# Patient Record
Sex: FEMALE | Race: WHITE | NOT HISPANIC OR LATINO | Employment: OTHER | URBAN - METROPOLITAN AREA
[De-identification: names, ages, dates, MRNs, and addresses within clinical notes are randomized per-mention and may not be internally consistent; named-entity substitution may affect disease eponyms.]

---

## 2018-08-17 RX ORDER — FERROUS SULFATE 325(65) MG
1 TABLET ORAL
COMMUNITY
Start: 2006-07-21 | End: 2022-05-16 | Stop reason: SDUPTHER

## 2018-08-17 RX ORDER — DICLOFENAC SODIUM 75 MG/1
TABLET, DELAYED RELEASE ORAL
COMMUNITY
Start: 2016-08-29 | End: 2018-08-20

## 2018-08-17 RX ORDER — DIPHENOXYLATE HYDROCHLORIDE AND ATROPINE SULFATE 2.5; .025 MG/1; MG/1
1 TABLET ORAL
COMMUNITY

## 2018-08-17 RX ORDER — FLUTICASONE PROPIONATE 50 MCG
2 SPRAY, SUSPENSION (ML) NASAL
COMMUNITY
Start: 2018-06-25 | End: 2022-05-16

## 2018-08-17 RX ORDER — ALBUTEROL SULFATE 90 UG/1
2 AEROSOL, METERED RESPIRATORY (INHALATION) EVERY 4 HOURS
COMMUNITY
Start: 2017-11-08 | End: 2021-09-15 | Stop reason: SDUPTHER

## 2018-08-17 RX ORDER — HYDROXYZINE HYDROCHLORIDE 25 MG/1
TABLET, FILM COATED ORAL
COMMUNITY
Start: 2016-08-29 | End: 2021-11-17

## 2018-08-17 RX ORDER — BETAMETHASONE DIPROPIONATE 0.5 MG/G
CREAM TOPICAL
COMMUNITY
Start: 2016-08-29 | End: 2018-08-20

## 2018-08-17 RX ORDER — SENNOSIDES 8.6 MG
CAPSULE ORAL
COMMUNITY
Start: 2012-08-01 | End: 2018-08-20

## 2018-08-17 RX ORDER — ALPRAZOLAM 0.25 MG/1
TABLET ORAL
COMMUNITY
Start: 2015-11-09 | End: 2018-08-20

## 2018-08-17 RX ORDER — AMOXICILLIN 500 MG
CAPSULE ORAL
COMMUNITY
Start: 2015-11-09

## 2018-08-17 RX ORDER — FEXOFENADINE HCL 180 MG/1
TABLET ORAL
COMMUNITY
Start: 2013-11-05 | End: 2021-11-17

## 2018-08-17 RX ORDER — SIMVASTATIN 10 MG
10 TABLET ORAL
COMMUNITY
Start: 2018-04-30 | End: 2021-07-15 | Stop reason: SDUPTHER

## 2018-08-17 RX ORDER — GABAPENTIN 100 MG/1
CAPSULE ORAL
COMMUNITY
Start: 2016-10-05 | End: 2018-08-20

## 2018-08-17 RX ORDER — CLOBETASOL PROPIONATE 0.5 MG/G
CREAM TOPICAL
COMMUNITY
Start: 2015-11-09

## 2018-08-17 RX ORDER — CHLORZOXAZONE 500 MG/1
500 TABLET ORAL
COMMUNITY
Start: 2016-08-29 | End: 2018-08-20

## 2018-08-17 RX ORDER — AMLODIPINE BESYLATE 5 MG/1
5 TABLET ORAL
COMMUNITY
Start: 2018-03-26 | End: 2020-09-08 | Stop reason: SDUPTHER

## 2018-08-17 RX ORDER — LEVOTHYROXINE SODIUM 88 UG/1
88 TABLET ORAL
COMMUNITY
Start: 2018-06-15 | End: 2021-07-15 | Stop reason: SDUPTHER

## 2018-08-20 ENCOUNTER — OFFICE VISIT (OUTPATIENT)
Dept: GYNECOLOGY | Facility: CLINIC | Age: 78
End: 2018-08-20
Payer: COMMERCIAL

## 2018-08-20 VITALS
BODY MASS INDEX: 28.99 KG/M2 | HEART RATE: 68 BPM | HEIGHT: 65 IN | SYSTOLIC BLOOD PRESSURE: 132 MMHG | DIASTOLIC BLOOD PRESSURE: 72 MMHG | WEIGHT: 174 LBS | RESPIRATION RATE: 18 BRPM

## 2018-08-20 DIAGNOSIS — N95.2 POSTMENOPAUSAL ATROPHIC VAGINITIS: ICD-10-CM

## 2018-08-20 DIAGNOSIS — N95.9 MENOPAUSAL DISORDER: Primary | ICD-10-CM

## 2018-08-20 DIAGNOSIS — Z12.31 ENCOUNTER FOR SCREENING MAMMOGRAM FOR MALIGNANT NEOPLASM OF BREAST: ICD-10-CM

## 2018-08-20 DIAGNOSIS — M85.89 OSTEOPENIA OF MULTIPLE SITES: ICD-10-CM

## 2018-08-20 PROCEDURE — G0101 CA SCREEN;PELVIC/BREAST EXAM: HCPCS | Performed by: OBSTETRICS & GYNECOLOGY

## 2018-08-22 NOTE — PROGRESS NOTES
Assessment/Plan:  1  Normal Breast and GYN exam    2  Osteopenia -- FEXA ordered to f/u on severity and progression of disease    3  Atrophic vaginitis -- pt is asymptomatic       Diagnoses and all orders for this visit:    Menopausal disorder  -     DXA bone density spine hip and pelvis; Future    Postmenopausal atrophic vaginitis    Osteopenia of multiple sites  -     DXA bone density spine hip and pelvis; Future    Encounter for screening mammogram for malignant neoplasm of breast  -     Mammo screening bilateral w cad; Future    Other orders  -     Discontinue: acetaminophen (TYLENOL) 650 mg CR tablet; Reported on 3/9/2017   -     albuterol (PROVENTIL HFA,VENTOLIN HFA) 90 mcg/act inhaler; Inhale 2 puffs every 4 (four) hours  -     Discontinue: ALPRAZolam (XANAX) 0 25 mg tablet; Take by mouth  -     amLODIPine (NORVASC) 5 mg tablet; Take 5 mg by mouth  -     beclomethasone (QVAR) 40 MCG/ACT inhaler; Inhale 1 puff  -     Discontinue: betamethasone dipropionate (DIPROSONE) 0 05 % cream; Apply topically  -     BLACK COHOSH PO; 1 tab twice daily  -     Calcium Carbonate-Vitamin D (CALCIUM 500/D PO); Take 2 tablets by mouth  -     Emollient (CERAVE EX); Apply topically  -     Discontinue: chlorzoxazone (PARAFON FORTE) 500 mg tablet; Take 500 mg by mouth  -     Cholecalciferol (D-3-5) 5000 units capsule; Take 5,000 Units by mouth  -     clobetasol (TEMOVATE) 0 05 % cream; Apply topically  -     Discontinue: diclofenac (VOLTAREN) 75 mg EC tablet;   -     ferrous sulfate 325 (65 Fe) mg tablet; Take 1 tablet by mouth  -     fexofenadine (ALLEGRA) 180 MG tablet; Reported on 3/9/2017   -     Omega-3 Fatty Acids (FISH OIL) 1200 MG CAPS; Take by mouth  -     fluticasone (FLONASE) 50 mcg/act nasal spray; 2 sprays into each nostril  -     simvastatin (ZOCOR) 10 mg tablet;  Take 10 mg by mouth  -     Discontinue: gabapentin (NEURONTIN) 100 mg capsule; Reported on 3/9/2017   -     hydrOXYzine HCL (ATARAX) 25 mg tablet;   - levothyroxine 88 mcg tablet; Take 88 mcg by mouth  -     multivitamin (THERAGRAN) TABS; Take 1 tablet by mouth          Subjective:      Patient ID: Samantha Chen is a 70 y o  female  The patient is a 69 y/o, who has osteopenia and needs to F/u with a DEXA scan to see if it is progressing  Her bone density has not been bad enough to treat in the past   The patient has had a previous hysterectomy and BSO for endometriosis  She denies any vaginal bleeding, breast or bladder problems  She denies any vaginal dryness or dyspareunia  The following portions of the patient's history were reviewed and updated as appropriate: allergies, current medications, past family history, past medical history, past social history, past surgical history and problem list     Review of Systems   Constitutional: Positive for activity change  Respiratory: Negative for shortness of breath  Cardiovascular: Negative for chest pain  Gastrointestinal: Negative  Genitourinary: Negative  Skin: Negative  Psychiatric/Behavioral: Negative for agitation, behavioral problems and confusion  All other systems reviewed and are negative  Objective:      /72 (Cuff Size: Adult)   Pulse 68   Resp 18   Ht 5' 5" (1 651 m)   Wt 78 9 kg (174 lb)   BMI 28 96 kg/m²          Physical Exam   Constitutional: She appears well-developed and well-nourished  No distress  HENT:   Head: Normocephalic  Pulmonary/Chest: Effort normal  No bradypnea  No respiratory distress  Abdominal: She exhibits no distension and no mass  There is no tenderness  There is no rebound and no guarding  Genitourinary: Rectum normal  No breast swelling, tenderness, discharge or bleeding  No labial fusion  There is no rash, tenderness, lesion or injury on the right labia  There is no rash, tenderness, lesion or injury on the left labia  No erythema, tenderness or bleeding in the vagina  No foreign body in the vagina   No signs of injury around the vagina  No vaginal discharge found  Genitourinary Comments: The cervix uterus and adnexa are surgically absent  Atrophic vaginal changes are noted  Lymphadenopathy:        Right axillary: No pectoral and no lateral adenopathy present  Left axillary: No pectoral and no lateral adenopathy present  Skin: Skin is warm, dry and intact  She is not diaphoretic  No cyanosis  Nails show no clubbing  Psychiatric: She has a normal mood and affect   Her speech is normal and behavior is normal

## 2018-09-20 ENCOUNTER — TELEPHONE (OUTPATIENT)
Dept: GYNECOLOGY | Facility: CLINIC | Age: 78
End: 2018-09-20

## 2018-09-20 PROBLEM — Z01.419 ENCOUNTER FOR ANNUAL ROUTINE GYNECOLOGICAL EXAMINATION: Status: ACTIVE | Noted: 2018-09-20

## 2018-09-20 NOTE — TELEPHONE ENCOUNTER
Pt would like to have her dx codes changed from her 8/18/18 visit  It was charged for a menopausal visit and not her annual exam  Pt states her insurance will cover an annual and not a problem visit  Please change codes and I will contact our billing office to resubmit  Once complete pt would like a call

## 2018-09-20 NOTE — TELEPHONE ENCOUNTER
New diagnosis code for annual GYN visit may be used  I tried to add it to the visit problem list   Ask billing to resubmit with new code

## 2018-09-21 ENCOUNTER — TELEPHONE (OUTPATIENT)
Dept: GYNECOLOGY | Facility: CLINIC | Age: 78
End: 2018-09-21

## 2018-09-21 NOTE — TELEPHONE ENCOUNTER
Pt called to request that her appointment be resubmitted for an annual visit instead of problem visit  Pt has medicare so typically doesn't cover annuals every year but pt has supplemental insurance that will cover every year  Hanna Adamson in billing office at  and requested to be resubmitted

## 2018-10-03 ENCOUNTER — TELEPHONE (OUTPATIENT)
Dept: GYNECOLOGY | Facility: CLINIC | Age: 78
End: 2018-10-03

## 2018-10-03 NOTE — TELEPHONE ENCOUNTER
Mary Benson calling again to request information regarding her billing issue  I called Alden Pascal in physician billing again and asked that she resubmit the visit  Pt aware of status

## 2018-10-17 ENCOUNTER — TELEPHONE (OUTPATIENT)
Dept: GYNECOLOGY | Facility: CLINIC | Age: 78
End: 2018-10-17

## 2018-11-09 ENCOUNTER — TELEPHONE (OUTPATIENT)
Dept: GYNECOLOGY | Facility: CLINIC | Age: 78
End: 2018-11-09

## 2018-11-09 NOTE — TELEPHONE ENCOUNTER
Rona Brewster calling to let our office know that she had her dexa scan done at [facility] 2 weeks ago  She was calling to get results  Please see care everywhere and let me know what to tell patient  Pt is aware that Dr Naomi Hendrix is out today and will return Monday

## 2018-11-12 NOTE — TELEPHONE ENCOUNTER
Notify pt that her DEXA shows stable osteopenia in the hip and spine, but worsening in the forearm  She should consider treatment with something like fosamax or actonel, if she has not been on it in the past   She should talk with her PCP about this

## 2018-11-15 ENCOUNTER — TELEPHONE (OUTPATIENT)
Dept: GYNECOLOGY | Facility: CLINIC | Age: 78
End: 2018-11-15

## 2018-11-15 NOTE — TELEPHONE ENCOUNTER
Bin Neves calling again to discuss her billing questions  Please see chart review, patient has called several times  I routed to [facility] and Dr Paul Bernal  Please let me know what I can do  Patient has called many many times  I LM for billing dept to change codes to annual instead of problem visit  Patient continues to get bill for 20 dollar co pay and is now getting very angry

## 2018-11-19 NOTE — TELEPHONE ENCOUNTER
I spoke with the billing department and will send over requested information to have coding changed

## 2018-11-26 ENCOUNTER — TELEPHONE (OUTPATIENT)
Dept: GYNECOLOGY | Facility: CLINIC | Age: 78
End: 2018-11-26

## 2018-11-26 NOTE — TELEPHONE ENCOUNTER
Herberth Whipple calling again  Please call patient ASAP  She has a bill for 20 dollars and is very upset and concerned that it is going to go to collections

## 2018-12-17 ENCOUNTER — TELEPHONE (OUTPATIENT)
Dept: GYNECOLOGY | Facility: CLINIC | Age: 78
End: 2018-12-17

## 2018-12-17 NOTE — TELEPHONE ENCOUNTER
Ardian Sorensen calling very upset, she received a letter from [facility] stating she was being sent to collections  I contacted Mena Albright via text, to ask what was happening with this patients bill  Mena Albright is on PTO today but will look into it on Tuesday, 12/18/18

## 2019-08-21 ENCOUNTER — OFFICE VISIT (OUTPATIENT)
Dept: GYNECOLOGY | Facility: CLINIC | Age: 79
End: 2019-08-21
Payer: COMMERCIAL

## 2019-08-21 VITALS
WEIGHT: 173.4 LBS | DIASTOLIC BLOOD PRESSURE: 80 MMHG | RESPIRATION RATE: 16 BRPM | SYSTOLIC BLOOD PRESSURE: 134 MMHG | TEMPERATURE: 97.9 F | HEART RATE: 77 BPM | HEIGHT: 61 IN | BODY MASS INDEX: 32.74 KG/M2

## 2019-08-21 DIAGNOSIS — Z11.51 SCREENING FOR HUMAN PAPILLOMAVIRUS (HPV): ICD-10-CM

## 2019-08-21 DIAGNOSIS — Z01.419 ENCOUNTER FOR GYNECOLOGICAL EXAMINATION (GENERAL) (ROUTINE) WITHOUT ABNORMAL FINDINGS: Primary | ICD-10-CM

## 2019-08-21 DIAGNOSIS — Z12.39 BREAST CANCER SCREENING: ICD-10-CM

## 2019-08-21 PROCEDURE — G0145 SCR C/V CYTO,THINLAYER,RESCR: HCPCS | Performed by: OBSTETRICS & GYNECOLOGY

## 2019-08-21 PROCEDURE — 87624 HPV HI-RISK TYP POOLED RSLT: CPT | Performed by: OBSTETRICS & GYNECOLOGY

## 2019-08-21 PROCEDURE — G0101 CA SCREEN;PELVIC/BREAST EXAM: HCPCS | Performed by: OBSTETRICS & GYNECOLOGY

## 2019-08-21 NOTE — PROGRESS NOTES
Assessment/Plan:       Diagnoses and all orders for this visit:    Encounter for gynecological examination (general) (routine) without abnormal findings    Breast cancer screening  -     Mammo screening bilateral w cad; Future    Other orders  -     metFORMIN (GLUCOPHAGE) 500 mg tablet; Take 500 mg by mouth daily          Subjective:      Patient ID: Magy Camacho is a 70 y o  female  The patient is a 80-year-old who presents for an annual gyn exam   She has had a previous hysterectomy with BSO for endometriosis  The patient denies any vaginal bleeding, breast problems, or bladder issues  She has no vaginal dryness or dyspareunia  The patient had a DEXA scan in October of 2018  This showed osteopenia with a T-score -1 9 in the femoral neck and -1 2 in the spine  She will have a DEXA scan repeated in October of 2020  She had a mammogram in September 2018 and this was normal   She has an appointment set for a annual mammogram again on September 30th 2019  The patient has no recent Paps on record  She will have 1 done this year  She understands that she may not need future Paps, if her Pap and HPV are normal today  The following portions of the patient's history were reviewed and updated as appropriate: allergies, current medications, past family history, past medical history, past social history, past surgical history and problem list     Review of Systems   Constitutional: Negative  Respiratory: Positive for shortness of breath  Three pillow orthopnea   Cardiovascular: Negative for chest pain  Gastrointestinal: Negative  Genitourinary: Negative  Skin: Negative  Psychiatric/Behavioral: Negative for agitation, behavioral problems and confusion           Objective:      /80 (BP Location: Left arm, Patient Position: Sitting, Cuff Size: Standard)   Pulse 77   Temp 97 9 °F (36 6 °C) (Tympanic)   Resp 16   Ht 5' 1" (1 549 m)   Wt 78 7 kg (173 lb 6 4 oz)   BMI 32 76 kg/m² Physical Exam   Constitutional: She appears well-developed and well-nourished  No distress  HENT:   Head: Normocephalic  Pulmonary/Chest: Effort normal  No respiratory distress  Right breast exhibits no inverted nipple, no mass, no nipple discharge, no skin change and no tenderness  Left breast exhibits no inverted nipple, no mass, no nipple discharge, no skin change and no tenderness  Breasts are symmetrical    Abdominal: She exhibits no distension and no mass  There is no tenderness  There is no rebound and no guarding  Genitourinary: Rectum normal  Pelvic exam was performed with patient supine  No labial fusion  There is no rash, tenderness, lesion or injury on the right labia  There is no rash, tenderness, lesion or injury on the left labia  No erythema, tenderness or bleeding in the vagina  No foreign body in the vagina  No signs of injury around the vagina  No vaginal discharge found  Genitourinary Comments: Large rectocele is present  The urethral meatus is normal    Atrophic vaginal changes are present  The cervix, uterus and adnexa are surgically absent  Lymphadenopathy:        Right axillary: No pectoral and no lateral adenopathy present  Left axillary: No pectoral and no lateral adenopathy present  Skin: Skin is warm, dry and intact  She is not diaphoretic  No cyanosis  Nails show no clubbing  Psychiatric: She has a normal mood and affect   Her speech is normal and behavior is normal

## 2019-08-22 LAB
HPV HR 12 DNA CVX QL NAA+PROBE: NEGATIVE
HPV16 DNA CVX QL NAA+PROBE: NEGATIVE
HPV18 DNA CVX QL NAA+PROBE: NEGATIVE

## 2019-08-23 LAB
LAB AP GYN PRIMARY INTERPRETATION: NORMAL
Lab: NORMAL

## 2019-08-27 ENCOUNTER — TELEPHONE (OUTPATIENT)
Dept: GYNECOLOGY | Facility: CLINIC | Age: 79
End: 2019-08-27

## 2019-08-27 NOTE — TELEPHONE ENCOUNTER
----- Message from Lionel Sofia MD sent at 8/23/2019 11:03 AM EDT -----  Notify pt that her pap and HPV are normal

## 2019-10-02 DIAGNOSIS — Z12.39 BREAST CANCER SCREENING: ICD-10-CM

## 2020-09-08 ENCOUNTER — ANNUAL EXAM (OUTPATIENT)
Dept: GYNECOLOGY | Facility: CLINIC | Age: 80
End: 2020-09-08
Payer: COMMERCIAL

## 2020-09-08 VITALS
DIASTOLIC BLOOD PRESSURE: 78 MMHG | HEART RATE: 88 BPM | BODY MASS INDEX: 32.66 KG/M2 | HEIGHT: 61 IN | WEIGHT: 173 LBS | SYSTOLIC BLOOD PRESSURE: 118 MMHG

## 2020-09-08 DIAGNOSIS — N95.2 ATROPHIC VAGINITIS: ICD-10-CM

## 2020-09-08 DIAGNOSIS — N81.6 RECTOCELE: ICD-10-CM

## 2020-09-08 DIAGNOSIS — Z01.419 ENCOUNTER FOR GYNECOLOGICAL EXAMINATION WITHOUT ABNORMAL FINDING: ICD-10-CM

## 2020-09-08 DIAGNOSIS — Z12.31 ENCOUNTER FOR SCREENING MAMMOGRAM FOR MALIGNANT NEOPLASM OF BREAST: ICD-10-CM

## 2020-09-08 DIAGNOSIS — R52 PAIN: Primary | ICD-10-CM

## 2020-09-08 DIAGNOSIS — B35.6 TINEA CRURIS: ICD-10-CM

## 2020-09-08 DIAGNOSIS — M85.80 OSTEOPENIA, UNSPECIFIED LOCATION: ICD-10-CM

## 2020-09-08 DIAGNOSIS — M85.80 OSTEOPENIA, UNSPECIFIED LOCATION: Primary | ICD-10-CM

## 2020-09-08 PROCEDURE — G0101 CA SCREEN;PELVIC/BREAST EXAM: HCPCS | Performed by: OBSTETRICS & GYNECOLOGY

## 2020-09-08 RX ORDER — AMLODIPINE BESYLATE 10 MG/1
10 TABLET ORAL DAILY
COMMUNITY
Start: 2020-06-08 | End: 2021-07-15 | Stop reason: SDUPTHER

## 2020-09-08 RX ORDER — CHLORZOXAZONE 500 MG/1
500 TABLET ORAL 4 TIMES DAILY PRN
Qty: 30 TABLET | Refills: 3 | Status: SHIPPED | OUTPATIENT
Start: 2020-09-08 | End: 2021-11-17

## 2020-09-08 RX ORDER — CLOTRIMAZOLE AND BETAMETHASONE DIPROPIONATE 10; .64 MG/G; MG/G
CREAM TOPICAL 2 TIMES DAILY
Qty: 30 G | Refills: 0 | Status: SHIPPED | OUTPATIENT
Start: 2020-09-08

## 2020-09-08 RX ORDER — DICLOFENAC SODIUM 75 MG/1
75 TABLET, DELAYED RELEASE ORAL 2 TIMES DAILY
Qty: 1 TABLET | Refills: 0 | Status: SHIPPED | OUTPATIENT
Start: 2020-09-08 | End: 2021-09-15

## 2020-09-08 NOTE — PROGRESS NOTES
Assessment/Plan:         Diagnoses and all orders for this visit:    Encounter for screening mammogram for malignant neoplasm of breast  -     Mammo screening bilateral w 3d & cad; Future    Encounter for gynecological examination without abnormal finding    Atrophic vaginitis    Osteopenia, unspecified location    Rectocele; stable-- follow    Other orders  -     amLODIPine (NORVASC) 10 mg tablet; Take 10 mg by mouth daily      Tinea cruris; Lotrisone      Subjective:      Patient ID: Kellen Rachel is a 68 y o  female  HPI  patient presents for checkup  She has had a prior JOSE BSO for endometriosis several years ago  She presents complaining of a rash in both inguinal areas over the past week  These are pruritic  She denies any fever or  abdominal pain  Denies any dysuria, hematuria urgency or stress urinary incontinence  Patient's previous gynecologist had seen patient 1 year ago for annual examination  The patient has a known rectocele  Patient is asymptomatic from the rectocele  The following portions of the patient's history were reviewed and updated as appropriate:   She  has a past medical history of Arthritis, Asthma, Endometriosis, H/O mammogram (09/21/2017), Osteopenia, Osteoporosis, Rectocele, Skin cancer, and Vaginal Pap smear (08/17/2017)  She   Patient Active Problem List    Diagnosis Date Noted    Rectocele     Encounter for annual routine gynecological examination 09/20/2018     She  has a past surgical history that includes Total abdominal hysterectomy w/ bilateral salpingoophorectomy; Cataract extraction; Knee surgery (Left); Cholecystectomy; Tubal ligation; Tonsillectomy; Other surgical history (2003); and Appendectomy  Her family history is not on file  She  reports that she has never smoked  She has never used smokeless tobacco  She reports that she does not drink alcohol or use drugs    Current Outpatient Medications   Medication Sig Dispense Refill    amLODIPine (NORVASC) 10 mg tablet Take 10 mg by mouth daily      albuterol (PROVENTIL HFA,VENTOLIN HFA) 90 mcg/act inhaler Inhale 2 puffs every 4 (four) hours      beclomethasone (QVAR) 40 MCG/ACT inhaler Inhale 1 puff      BLACK COHOSH PO 1 tab twice daily      Calcium Carbonate-Vitamin D (CALCIUM 500/D PO) Take 2 tablets by mouth      Cholecalciferol (D-3-5) 5000 units capsule Take 5,000 Units by mouth      clobetasol (TEMOVATE) 0 05 % cream Apply topically      Emollient (CERAVE EX) Apply topically      ferrous sulfate 325 (65 Fe) mg tablet Take 1 tablet by mouth      fexofenadine (ALLEGRA) 180 MG tablet Reported on 3/9/2017       fluticasone (FLONASE) 50 mcg/act nasal spray 2 sprays into each nostril      hydrOXYzine HCL (ATARAX) 25 mg tablet       levothyroxine 88 mcg tablet Take 88 mcg by mouth      metFORMIN (GLUCOPHAGE) 500 mg tablet Take 500 mg by mouth daily      multivitamin (THERAGRAN) TABS Take 1 tablet by mouth      Omega-3 Fatty Acids (FISH OIL) 1200 MG CAPS Take by mouth      simvastatin (ZOCOR) 10 mg tablet Take 10 mg by mouth       No current facility-administered medications for this visit        Current Outpatient Medications on File Prior to Visit   Medication Sig    amLODIPine (NORVASC) 10 mg tablet Take 10 mg by mouth daily    albuterol (PROVENTIL HFA,VENTOLIN HFA) 90 mcg/act inhaler Inhale 2 puffs every 4 (four) hours    beclomethasone (QVAR) 40 MCG/ACT inhaler Inhale 1 puff    BLACK COHOSH PO 1 tab twice daily    Calcium Carbonate-Vitamin D (CALCIUM 500/D PO) Take 2 tablets by mouth    Cholecalciferol (D-3-5) 5000 units capsule Take 5,000 Units by mouth    clobetasol (TEMOVATE) 0 05 % cream Apply topically    Emollient (CERAVE EX) Apply topically    ferrous sulfate 325 (65 Fe) mg tablet Take 1 tablet by mouth    fexofenadine (ALLEGRA) 180 MG tablet Reported on 3/9/2017     fluticasone (FLONASE) 50 mcg/act nasal spray 2 sprays into each nostril    hydrOXYzine HCL (ATARAX) 25 mg tablet     levothyroxine 88 mcg tablet Take 88 mcg by mouth    metFORMIN (GLUCOPHAGE) 500 mg tablet Take 500 mg by mouth daily    multivitamin (THERAGRAN) TABS Take 1 tablet by mouth    Omega-3 Fatty Acids (FISH OIL) 1200 MG CAPS Take by mouth    simvastatin (ZOCOR) 10 mg tablet Take 10 mg by mouth    [DISCONTINUED] amLODIPine (NORVASC) 5 mg tablet Take 5 mg by mouth     No current facility-administered medications on file prior to visit  She is allergic to latex; ace inhibitors; alendronate; atenolol; beta adrenergic blockers; lisinopril; prednisolone; pyrithione; sulfa antibiotics; tobramycin; trimethoprim; bacitracin; and nifedipine       Review of Systems   Constitutional: Negative  HENT: Negative for sore throat and trouble swallowing  Gastrointestinal: Negative  Genitourinary: Negative  Objective:      /78 (BP Location: Left arm, Patient Position: Sitting, Cuff Size: Standard)   Pulse 88   Ht 5' 1" (1 549 m)   Wt 78 5 kg (173 lb)   BMI 32 69 kg/m²          Physical Exam  Vitals signs reviewed  Neck:      Musculoskeletal: Normal range of motion and neck supple  No muscular tenderness  Cardiovascular:      Rate and Rhythm: Normal rate and regular rhythm  Pulses: Normal pulses  Heart sounds: Normal heart sounds  No murmur  Pulmonary:      Effort: Pulmonary effort is normal  No respiratory distress  Breath sounds: Normal breath sounds  Chest:      Breasts:         Right: Normal          Left: Normal    Abdominal:      General: Abdomen is flat  There is no distension  Palpations: Abdomen is soft  There is no mass  Tenderness: There is no abdominal tenderness  There is no guarding or rebound  Hernia: No hernia is present  Genitourinary:     Comments: Patient has erythematous rash in both inguinal areas consistent with tenia cruris  Uterus and cervix are surgically absent  Vagina with atrophic changes    Bartholin's and Buffalo's glands normal  Urethral orifice normal   Grade 2 rectocele present  No cystocele present  Lymphadenopathy:      Cervical: No cervical adenopathy  Neurological:      Mental Status: She is alert

## 2020-10-08 DIAGNOSIS — Z12.31 ENCOUNTER FOR SCREENING MAMMOGRAM FOR MALIGNANT NEOPLASM OF BREAST: ICD-10-CM

## 2020-10-23 DIAGNOSIS — M85.80 OSTEOPENIA, UNSPECIFIED LOCATION: ICD-10-CM

## 2020-10-30 ENCOUNTER — TELEPHONE (OUTPATIENT)
Dept: GYNECOLOGY | Facility: CLINIC | Age: 80
End: 2020-10-30

## 2020-10-30 DIAGNOSIS — M81.0 AGE-RELATED OSTEOPOROSIS WITHOUT CURRENT PATHOLOGICAL FRACTURE: Primary | ICD-10-CM

## 2020-10-30 DIAGNOSIS — E55.9 VITAMIN D INSUFFICIENCY: ICD-10-CM

## 2020-12-09 LAB
CREAT ?TM UR-SCNC: 181 UMOL/L
EXT MICROALBUMIN URINE RANDOM: 2
MICROALBUMIN/CREAT UR: 11 MG/G{CREAT}

## 2021-06-25 ENCOUNTER — HOSPITAL ENCOUNTER (EMERGENCY)
Facility: HOSPITAL | Age: 81
Discharge: HOME | End: 2021-06-25
Attending: EMERGENCY MEDICINE | Admitting: EMERGENCY MEDICINE
Payer: COMMERCIAL

## 2021-06-25 VITALS
SYSTOLIC BLOOD PRESSURE: 152 MMHG | HEART RATE: 69 BPM | OXYGEN SATURATION: 96 % | TEMPERATURE: 98.3 F | DIASTOLIC BLOOD PRESSURE: 75 MMHG | RESPIRATION RATE: 18 BRPM

## 2021-06-25 DIAGNOSIS — R19.7 DIARRHEA: ICD-10-CM

## 2021-06-25 DIAGNOSIS — R42 LIGHTHEADEDNESS: Primary | ICD-10-CM

## 2021-06-25 LAB
ANION GAP SERPL CALCULATED.3IONS-SCNC: 9 MMOL/L (ref 4–13)
ATRIAL RATE: 71 BPM
BACTERIA UR QL AUTO: ABNORMAL /HPF
BASOPHILS # BLD AUTO: 0.03 THOUSANDS/ΜL (ref 0–0.1)
BASOPHILS NFR BLD AUTO: 1 % (ref 0–1)
BILIRUB UR QL STRIP: NEGATIVE
BUN SERPL-MCNC: 13 MG/DL (ref 5–25)
CALCIUM SERPL-MCNC: 9.8 MG/DL (ref 8.3–10.1)
CHLORIDE SERPL-SCNC: 101 MMOL/L (ref 100–108)
CLARITY UR: CLEAR
CO2 SERPL-SCNC: 27 MMOL/L (ref 21–32)
COLOR UR: YELLOW
CREAT SERPL-MCNC: 0.69 MG/DL (ref 0.6–1.3)
EOSINOPHIL # BLD AUTO: 0.06 THOUSAND/ΜL (ref 0–0.61)
EOSINOPHIL NFR BLD AUTO: 1 % (ref 0–6)
ERYTHROCYTE [DISTWIDTH] IN BLOOD BY AUTOMATED COUNT: 13.2 % (ref 11.6–15.1)
GFR SERPL CREATININE-BSD FRML MDRD: 83 ML/MIN/1.73SQ M
GLUCOSE SERPL-MCNC: 137 MG/DL (ref 65–140)
GLUCOSE UR STRIP-MCNC: NEGATIVE MG/DL
HCT VFR BLD AUTO: 39.5 % (ref 34.8–46.1)
HGB BLD-MCNC: 12.9 G/DL (ref 11.5–15.4)
HGB UR QL STRIP.AUTO: NEGATIVE
IMM GRANULOCYTES # BLD AUTO: 0.04 THOUSAND/UL (ref 0–0.2)
IMM GRANULOCYTES NFR BLD AUTO: 1 % (ref 0–2)
KETONES UR STRIP-MCNC: NEGATIVE MG/DL
LEUKOCYTE ESTERASE UR QL STRIP: ABNORMAL
LYMPHOCYTES # BLD AUTO: 1.47 THOUSANDS/ΜL (ref 0.6–4.47)
LYMPHOCYTES NFR BLD AUTO: 25 % (ref 14–44)
MCH RBC QN AUTO: 30.4 PG (ref 26.8–34.3)
MCHC RBC AUTO-ENTMCNC: 32.7 G/DL (ref 31.4–37.4)
MCV RBC AUTO: 93 FL (ref 82–98)
MONOCYTES # BLD AUTO: 0.76 THOUSAND/ΜL (ref 0.17–1.22)
MONOCYTES NFR BLD AUTO: 13 % (ref 4–12)
NEUTROPHILS # BLD AUTO: 3.65 THOUSANDS/ΜL (ref 1.85–7.62)
NEUTS SEG NFR BLD AUTO: 59 % (ref 43–75)
NITRITE UR QL STRIP: NEGATIVE
NON-SQ EPI CELLS URNS QL MICRO: ABNORMAL /HPF
NRBC BLD AUTO-RTO: 0 /100 WBCS
P AXIS: 44 DEGREES
PH UR STRIP.AUTO: 6 [PH] (ref 4.5–8)
PLATELET # BLD AUTO: 329 THOUSANDS/UL (ref 149–390)
PMV BLD AUTO: 10.3 FL (ref 8.9–12.7)
POTASSIUM SERPL-SCNC: 3.9 MMOL/L (ref 3.5–5.3)
PR INTERVAL: 178 MS
PROT UR STRIP-MCNC: NEGATIVE MG/DL
QRS AXIS: -2 DEGREES
QRSD INTERVAL: 116 MS
QT INTERVAL: 372 MS
QTC INTERVAL: 404 MS
RBC # BLD AUTO: 4.24 MILLION/UL (ref 3.81–5.12)
RBC #/AREA URNS AUTO: ABNORMAL /HPF
SODIUM SERPL-SCNC: 137 MMOL/L (ref 136–145)
SP GR UR STRIP.AUTO: 1.01 (ref 1–1.03)
T WAVE AXIS: 51 DEGREES
TROPONIN I SERPL-MCNC: 0.03 NG/ML
UROBILINOGEN UR QL STRIP.AUTO: 0.2 E.U./DL
VENTRICULAR RATE: 71 BPM
WBC # BLD AUTO: 6.01 THOUSAND/UL (ref 4.31–10.16)
WBC #/AREA URNS AUTO: ABNORMAL /HPF

## 2021-06-25 PROCEDURE — 80048 BASIC METABOLIC PNL TOTAL CA: CPT | Performed by: EMERGENCY MEDICINE

## 2021-06-25 PROCEDURE — 99285 EMERGENCY DEPT VISIT HI MDM: CPT | Performed by: EMERGENCY MEDICINE

## 2021-06-25 PROCEDURE — 93005 ELECTROCARDIOGRAM TRACING: CPT

## 2021-06-25 PROCEDURE — 84484 ASSAY OF TROPONIN QUANT: CPT | Performed by: EMERGENCY MEDICINE

## 2021-06-25 PROCEDURE — 85025 COMPLETE CBC W/AUTO DIFF WBC: CPT | Performed by: EMERGENCY MEDICINE

## 2021-06-25 PROCEDURE — 93010 ELECTROCARDIOGRAM REPORT: CPT | Performed by: INTERNAL MEDICINE

## 2021-06-25 PROCEDURE — 81001 URINALYSIS AUTO W/SCOPE: CPT

## 2021-06-25 PROCEDURE — 36415 COLL VENOUS BLD VENIPUNCTURE: CPT | Performed by: EMERGENCY MEDICINE

## 2021-06-25 PROCEDURE — 96360 HYDRATION IV INFUSION INIT: CPT

## 2021-06-25 PROCEDURE — 99284 EMERGENCY DEPT VISIT MOD MDM: CPT

## 2021-06-25 PROCEDURE — 96361 HYDRATE IV INFUSION ADD-ON: CPT

## 2021-06-25 RX ADMIN — SODIUM CHLORIDE 1000 ML: 0.9 INJECTION, SOLUTION INTRAVENOUS at 12:51

## 2021-06-25 NOTE — ED PROVIDER NOTES
History  Chief Complaint   Patient presents with    Dizziness     Pt  reports lightheadedness all starting last night  Pt  denies chest pain  Pt  reports some SOB which is normal for her      80 y o  F p/w lightheadedness x last night  Reports she got up to use the bathroom and felt lightheaded upon standing  This lasted a few seconds and resolved  She went back to bed and felt fine  She states she got up again to use the bathroom and again felt lightheaded upon standing and sensation lasted a few minutes  She reports diarrhea  She denies F/C, HA, tinnitus, changes in vision, URI complaints, CP, abd pain, N/V  History provided by:  Patient   used: No    Dizziness  Quality:  Lightheadedness  Timing:  Intermittent  Progression:  Unchanged  Chronicity:  New  Context: standing up    Relieved by:  Lying down  Worsened by:  Standing up  Associated symptoms: diarrhea    Associated symptoms: no chest pain, no headaches, no nausea and no vomiting        Prior to Admission Medications   Prescriptions Last Dose Informant Patient Reported? Taking?    BLACK COHOSH PO   Yes Yes   Si tab twice daily   Calcium Carbonate-Vitamin D (CALCIUM 500/D PO)   Yes Yes   Sig: Take 2 tablets by mouth   Cholecalciferol (D-3-5) 5000 units capsule   Yes Yes   Sig: Take 5,000 Units by mouth   Emollient (CERAVE EX)   Yes Yes   Sig: Apply topically   Omega-3 Fatty Acids (FISH OIL) 1200 MG CAPS   Yes Yes   Sig: Take by mouth   albuterol (PROVENTIL HFA,VENTOLIN HFA) 90 mcg/act inhaler   Yes Yes   Sig: Inhale 2 puffs every 4 (four) hours   amLODIPine (NORVASC) 10 mg tablet   Yes Yes   Sig: Take 10 mg by mouth daily   beclomethasone (QVAR) 40 MCG/ACT inhaler   Yes Yes   Sig: Inhale 1 puff   chlorzoxazone (PARAFON FORTE) 500 mg tablet   No Yes   Sig: Take 1 tablet (500 mg total) by mouth 4 (four) times a day as needed for muscle spasms for up to 30 doses   clobetasol (TEMOVATE) 0 05 % cream Unknown at Unknown time  Yes No   Sig: Apply topically   clotrimazole-betamethasone (LOTRISONE) 1-0 05 % cream Unknown at Unknown time  No No   Sig: Apply topically 2 (two) times a day   diclofenac (VOLTAREN) 75 mg EC tablet   No Yes   Sig: Take 1 tablet (75 mg total) by mouth 2 (two) times a day for 1 dose   ferrous sulfate 325 (65 Fe) mg tablet   Yes Yes   Sig: Take 1 tablet by mouth   fexofenadine (ALLEGRA) 180 MG tablet   Yes Yes   Sig: Reported on 3/9/2017    fluticasone (FLONASE) 50 mcg/act nasal spray   Yes Yes   Si sprays into each nostril   hydrOXYzine HCL (ATARAX) 25 mg tablet Not Taking at Unknown time  Yes No   Patient not taking: Reported on 2021   levothyroxine 88 mcg tablet   Yes Yes   Sig: Take 88 mcg by mouth   metFORMIN (GLUCOPHAGE) 500 mg tablet   Yes Yes   Sig: Take 500 mg by mouth daily   multivitamin (THERAGRAN) TABS   Yes Yes   Sig: Take 1 tablet by mouth   simvastatin (ZOCOR) 10 mg tablet   Yes Yes   Sig: Take 10 mg by mouth      Facility-Administered Medications: None       Past Medical History:   Diagnosis Date    Arthritis     Asthma     Endometriosis     H/O mammogram 2017    Scattered fibroglandular densities, BIRADS 1    Osteopenia     on Evista-d/c    Osteoporosis     Rectocele     grade 2    Skin cancer     treating with dermatologist    Vaginal Pap smear 2017    WNL       Past Surgical History:   Procedure Laterality Date    APPENDECTOMY      CATARACT EXTRACTION      and lens implants    CHOLECYSTECTOMY      KNEE SURGERY Left     knee replacement-3x    OTHER SURGICAL HISTORY      ERT-estrogen replacement therapy    TONSILLECTOMY      TOTAL ABDOMINAL HYSTERECTOMY W/ BILATERAL SALPINGOOPHORECTOMY      age 41    TUBAL LIGATION         History reviewed  No pertinent family history  I have reviewed and agree with the history as documented      E-Cigarette/Vaping     E-Cigarette/Vaping Substances     Social History     Tobacco Use    Smoking status: Never Smoker    Smokeless tobacco: Never Used   Substance Use Topics    Alcohol use: Never    Drug use: No       Review of Systems   Constitutional: Negative for fever  HENT: Negative for ear pain  Eyes: Negative for visual disturbance  Respiratory: Negative for cough  Cardiovascular: Negative for chest pain  Gastrointestinal: Positive for diarrhea  Negative for abdominal pain, nausea and vomiting  Neurological: Positive for light-headedness  Negative for dizziness and headaches  All other systems reviewed and are negative  Physical Exam  Physical Exam  Vitals and nursing note reviewed  Constitutional:       General: She is not in acute distress  Appearance: She is well-developed  She is not ill-appearing, toxic-appearing or diaphoretic  HENT:      Head: Normocephalic and atraumatic  Eyes:      General: No scleral icterus  Extraocular Movements:      Right eye: No nystagmus  Left eye: No nystagmus  Conjunctiva/sclera:      Right eye: Right conjunctiva is not injected  Left eye: Left conjunctiva is not injected  Pupils: Pupils are equal, round, and reactive to light  Pupils are equal    Neck:      Vascular: No JVD  Trachea: Trachea normal    Cardiovascular:      Rate and Rhythm: Normal rate and regular rhythm  Pulses: Normal pulses  Heart sounds: Normal heart sounds  No murmur heard  No friction rub  Pulmonary:      Effort: Pulmonary effort is normal  No accessory muscle usage or respiratory distress  Breath sounds: Normal breath sounds  No stridor  No wheezing, rhonchi or rales  Chest:      Chest wall: No tenderness  Abdominal:      General: There is no distension  Palpations: Abdomen is soft  Tenderness: There is no abdominal tenderness  There is no guarding or rebound  Musculoskeletal:      Cervical back: Normal range of motion  Skin:     General: Skin is warm and dry  Coloration: Skin is not pale  Findings: No rash     Neurological: Mental Status: She is alert  GCS: GCS eye subscore is 4  GCS verbal subscore is 5  GCS motor subscore is 6  Cranial Nerves: Cranial nerves are intact     Psychiatric:         Behavior: Behavior normal          Vital Signs  ED Triage Vitals [06/25/21 1157]   Temperature Pulse Respirations Blood Pressure SpO2   98 3 °F (36 8 °C) 80 18 161/80 95 %      Temp Source Heart Rate Source Patient Position - Orthostatic VS BP Location FiO2 (%)   Oral Monitor Sitting Right arm --      Pain Score       --           Vitals:    06/25/21 1157 06/25/21 1411   BP: 161/80 152/75   Pulse: 80 69   Patient Position - Orthostatic VS: Sitting Lying         Visual Acuity      ED Medications  Medications   sodium chloride 0 9 % bolus 1,000 mL (0 mL Intravenous Stopped 6/25/21 1429)       Diagnostic Studies  Results Reviewed     Procedure Component Value Units Date/Time    Urine Microscopic [[id number]]  (Abnormal) Collected: 06/25/21 1338    Lab Status: Final result Specimen: Urine, Clean Catch Updated: 06/25/21 1429     RBC, UA 0-1 /hpf      WBC, UA 2-4 /hpf      Epithelial Cells Occasional /hpf      Bacteria, UA Occasional /hpf     Urine Macroscopic, POC [[id number]]  (Abnormal) Collected: 06/25/21 1338    Lab Status: Final result Specimen: Urine Updated: 06/25/21 1340     Color, UA Yellow     Clarity, UA Clear     pH, UA 6 0     Leukocytes, UA Small     Nitrite, UA Negative     Protein, UA Negative mg/dl      Glucose, UA Negative mg/dl      Ketones, UA Negative mg/dl      Urobilinogen, UA 0 2 E U /dl      Bilirubin, UA Negative     Blood, UA Negative     Specific Gravity, UA 1 015    Narrative:      CLINITEK RESULT    Troponin I [[id number]]  (Normal) Collected: 06/25/21 1249    Lab Status: Final result Specimen: Blood from Arm, Right Updated: 06/25/21 1317     Troponin I 0 03 ng/mL     Basic metabolic panel [[id number]] Collected: 06/25/21 1249    Lab Status: Final result Specimen: Blood from Arm, Right Updated: 06/25/21 1310 Sodium 137 mmol/L      Potassium 3 9 mmol/L      Chloride 101 mmol/L      CO2 27 mmol/L      ANION GAP 9 mmol/L      BUN 13 mg/dL      Creatinine 0 69 mg/dL      Glucose 137 mg/dL      Calcium 9 8 mg/dL      eGFR 83 ml/min/1 73sq m     Narrative:      [place] guidelines for Chronic Kidney Disease (CKD):     Stage 1 with normal or high GFR (GFR > 90 mL/min/1 73 square meters)    Stage 2 Mild CKD (GFR = 60-89 mL/min/1 73 square meters)    Stage 3A Moderate CKD (GFR = 45-59 mL/min/1 73 square meters)    Stage 3B Moderate CKD (GFR = 30-44 mL/min/1 73 square meters)    Stage 4 Severe CKD (GFR = 15-29 mL/min/1 73 square meters)    Stage 5 End Stage CKD (GFR <15 mL/min/1 73 square meters)  Note: GFR calculation is accurate only with a steady state creatinine    CBC and differential [[id number]]  (Abnormal) Collected: 06/25/21 1249    Lab Status: Final result Specimen: Blood from Arm, Right Updated: 06/25/21 1256     WBC 6 01 Thousand/uL      RBC 4 24 Million/uL      Hemoglobin 12 9 g/dL      Hematocrit 39 5 %      MCV 93 fL      MCH 30 4 pg      MCHC 32 7 g/dL      RDW 13 2 %      MPV 10 3 fL      Platelets 381 Thousands/uL      nRBC 0 /100 WBCs      Neutrophils Relative 59 %      Immat GRANS % 1 %      Lymphocytes Relative 25 %      Monocytes Relative 13 %      Eosinophils Relative 1 %      Basophils Relative 1 %      Neutrophils Absolute 3 65 Thousands/µL      Immature Grans Absolute 0 04 Thousand/uL      Lymphocytes Absolute 1 47 Thousands/µL      Monocytes Absolute 0 76 Thousand/µL      Eosinophils Absolute 0 06 Thousand/µL      Basophils Absolute 0 03 Thousands/µL                  No orders to display              Procedures  ECG 12 Lead Documentation Only    Date/Time: 6/25/2021 12:16 PM  Performed by: Demond Wan DO  Authorized by: Demond Wan DO     Indications / Diagnosis:  Lightheadedness  ECG reviewed by me, the ED Provider: yes    Patient location: Bedside  Previous ECG:     Previous ECG:  Unavailable  Rate:     ECG rate:  71    ECG rate assessment: normal    Rhythm:     Rhythm: sinus rhythm    Ectopy:     Ectopy: none    QRS:     QRS axis:  Normal  ST segments:     ST segments:  Normal             ED Course  ED Course as of Jun 25 1458 Fri Jun 25, 2021   1403 Updated pt on labs  Pt feeling better after fluids  She states she was able to go to the bathroom and had no episodes of lightheadedness  Pt states she wants to go home  SBIRT 20yo+      Most Recent Value   SBIRT (24 yo +)   In order to provide better care to our patients, we are screening all of our patients for alcohol and drug use  Would it be okay to ask you these screening questions? Yes Filed at: 06/25/2021 1223   Initial Alcohol Screen: US AUDIT-C    1  How often do you have a drink containing alcohol?  0 Filed at: 06/25/2021 1223   2  How many drinks containing alcohol do you have on a typical day you are drinking? 0 Filed at: 06/25/2021 1223   3a  Male UNDER 65: How often do you have five or more drinks on one occasion? 0 Filed at: 06/25/2021 1223   3b  FEMALE Any Age, or MALE 65+: How often do you have 4 or more drinks on one occassion? 0 Filed at: 06/25/2021 1223   Audit-C Score  0 Filed at: 06/25/2021 1223   ROXANNE: How many times in the past year have you    Used an illegal drug or used a prescription medication for non-medical reasons?   Never Filed at: 06/25/2021 1223                    MDM    Disposition  Final diagnoses:   Lightheadedness   Diarrhea     Time reflects when diagnosis was documented in both MDM as applicable and the Disposition within this note     Time User Action Codes Description Comment    6/25/2021  1:30 PM Valerie Mayer 50 [R42] [address]     6/25/2021  2:07 PM Valerie Mayer 50 [R19 [id number]] Diarrhea       ED Disposition     ED Disposition Condition Date/Time Comment    Discharge Stable Fri Jun 25, 2021  2:32 PM Marty Rubio Stiff discharge to home/self care  Follow-up Information     Follow up With Specialties Details Why Contact Info Additional Information    Marvin Bernstein PA-C Physician Assistant Schedule an appointment as soon as possible for a visit   [address] [facility], [address] [place] [address] Emergency Department Emergency Medicine Go to  If symptoms worsen [facility] [id number]  [facility] Emergency Department, [address] [place] [address], [address]          Patient's Medications   Discharge Prescriptions    No medications on file     No discharge procedures on file      PDMP Review     None          ED Provider  Electronically Signed by           Stephanie Miller 26, DO  06/25/21 7695

## 2021-07-15 ENCOUNTER — TELEPHONE (OUTPATIENT)
Dept: ADMINISTRATIVE | Facility: OTHER | Age: 81
End: 2021-07-15

## 2021-07-15 ENCOUNTER — OFFICE VISIT (OUTPATIENT)
Dept: FAMILY MEDICINE CLINIC | Facility: CLINIC | Age: 81
End: 2021-07-15
Payer: COMMERCIAL

## 2021-07-15 VITALS
SYSTOLIC BLOOD PRESSURE: 122 MMHG | HEIGHT: 62 IN | DIASTOLIC BLOOD PRESSURE: 68 MMHG | OXYGEN SATURATION: 96 % | BODY MASS INDEX: 31.25 KG/M2 | WEIGHT: 169.8 LBS | HEART RATE: 64 BPM | TEMPERATURE: 97.6 F | RESPIRATION RATE: 22 BRPM

## 2021-07-15 DIAGNOSIS — E11.9 TYPE 2 DIABETES MELLITUS WITHOUT COMPLICATION, WITHOUT LONG-TERM CURRENT USE OF INSULIN (HCC): Primary | ICD-10-CM

## 2021-07-15 DIAGNOSIS — E78.2 MIXED HYPERLIPIDEMIA: ICD-10-CM

## 2021-07-15 DIAGNOSIS — E66.09 NON MORBID OBESITY DUE TO EXCESS CALORIES: ICD-10-CM

## 2021-07-15 DIAGNOSIS — M81.6 LOCALIZED OSTEOPOROSIS WITHOUT CURRENT PATHOLOGICAL FRACTURE: ICD-10-CM

## 2021-07-15 DIAGNOSIS — I10 BENIGN ESSENTIAL HYPERTENSION: ICD-10-CM

## 2021-07-15 DIAGNOSIS — E03.9 ACQUIRED HYPOTHYROIDISM: ICD-10-CM

## 2021-07-15 PROBLEM — Z96.659 HISTORY OF KNEE REPLACEMENT: Status: ACTIVE | Noted: 2017-06-22

## 2021-07-15 PROBLEM — Z01.419 ENCOUNTER FOR ANNUAL ROUTINE GYNECOLOGICAL EXAMINATION: Status: RESOLVED | Noted: 2018-09-20 | Resolved: 2021-07-15

## 2021-07-15 LAB — SL AMB POCT HEMOGLOBIN AIC: 6.1 (ref ?–6.5)

## 2021-07-15 PROCEDURE — 99204 OFFICE O/P NEW MOD 45 MIN: CPT | Performed by: FAMILY MEDICINE

## 2021-07-15 PROCEDURE — 3288F FALL RISK ASSESSMENT DOCD: CPT | Performed by: FAMILY MEDICINE

## 2021-07-15 PROCEDURE — 83036 HEMOGLOBIN GLYCOSYLATED A1C: CPT | Performed by: FAMILY MEDICINE

## 2021-07-15 RX ORDER — AMLODIPINE BESYLATE 10 MG/1
10 TABLET ORAL DAILY
Qty: 90 TABLET | Refills: 3 | Status: SHIPPED | OUTPATIENT
Start: 2021-07-15 | End: 2022-08-09 | Stop reason: SDUPTHER

## 2021-07-15 RX ORDER — ALENDRONATE SODIUM 70 MG/1
70 TABLET ORAL
Qty: 12 TABLET | Refills: 3 | Status: SHIPPED | OUTPATIENT
Start: 2021-07-15

## 2021-07-15 RX ORDER — SIMVASTATIN 10 MG
10 TABLET ORAL DAILY
Qty: 90 TABLET | Refills: 3 | Status: SHIPPED | OUTPATIENT
Start: 2021-07-15 | End: 2022-08-09 | Stop reason: SDUPTHER

## 2021-07-15 RX ORDER — SENNOSIDES 8.6 MG
CAPSULE ORAL
COMMUNITY

## 2021-07-15 RX ORDER — BUSPIRONE HYDROCHLORIDE 7.5 MG/1
7.5 TABLET ORAL 2 TIMES DAILY
COMMUNITY
Start: 2020-12-09 | End: 2021-07-15

## 2021-07-15 RX ORDER — LEVOTHYROXINE SODIUM 88 UG/1
88 TABLET ORAL DAILY
Qty: 90 TABLET | Refills: 3 | Status: SHIPPED | OUTPATIENT
Start: 2021-07-15 | End: 2022-08-09 | Stop reason: SDUPTHER

## 2021-07-15 NOTE — PROGRESS NOTES
Chief Complaint   Patient presents with   Jonel Martines [facility]    Facial Injury     bruise        HPI     68-year-old female presents as a new patient  Past medical history as noted below is significant for hypertension, diabetes, questionable hyperlipidemia, hypothyroidism, osteoporosis, osteoarthritis, skin cancers which have been removed, hearing loss, chronic back pain, and anxiety  Medications are noted below  Surgeries are noted below  Multiple allergies are listed, many of them she is not sure of what reaction she had  Nonsmoker  No alcohol  Regular medicines include Flonase as needed, albuterol as needed, amlodipine 10 mg daily for blood pressure, QVAR  As needed, buspirone 7 5 mg  as needed,  Levothyroxine 88, metformin 500 mg once daily, Allegra and hydroxyzine as needed for allergies, ferrous sulfate 325 mg daily, and some vitamins  Overall, she is feeling well  Did suffer a bruise on her right face when she ran into a suspended ladder as they were putting things away after their [place] vacation visiting their daughter  Last bone density was 10/20 revealing bone density of the lumbar spine to be 3 1 standard deviations below the mean  Left hip was 0 8 standard deviations below the mean, left femoral neck 2 2  She had been on Prolia but it was stopped by her previous gyn      Past Medical History:   Diagnosis Date    Acquired hypothyroidism 1/7/2010    Anxiety state 7/29/2010    Chronic low back pain 6/3/2015    Eczema 3/11/2014    Hearing loss 1/14/2014    Hyperlipidemia 6/3/2015    Hypertension     Iron deficiency anemia 4/6/2010    Mild persistent asthma without complication 0/1/0173    Formatting of this note might be different from the original  ICD10 clean up    Non morbid obesity due to excess calories 9/6/2017    Osteoarthritis 11/9/2015    Osteoporosis     Rectocele     grade 2    Skin cancer     treating with dermatologist    Type 2 diabetes mellitus without complication, without long-term current use of insulin ([facility] [address] ) 12/2/2018    Vitamin D deficiency 7/17/2013        Past Surgical History:   Procedure Laterality Date    APPENDECTOMY      CATARACT EXTRACTION Bilateral     and lens implants    CHOLECYSTECTOMY      KNEE SURGERY Bilateral     Revision of left knee 2006   Manny Garcia    MYRINGOTOMY W/ TUBES Right 02/08/2021    tube insertion by Dr Asim Barrera      age 41    TUBAL LIGATION         Social History     Tobacco Use    Smoking status: Never Smoker    Smokeless tobacco: Never Used   Substance Use Topics    Alcohol use: Never       Social History     Social History Narrative     since 1968  2 children  Daughter in [facility]  Son is a manager at [employer]       3 grandsons   was a  at [employer]  Enjoys reading, scrap booking, swimming  The following portions of the patient's history were reviewed and updated as appropriate: allergies, current medications, past family history, past medical history, past social history, past surgical history and problem list       Review of Systems   Constitutional: Negative for activity change, appetite change and unexpected weight change  HENT: Positive for hearing loss  Negative for ear pain  Eyes: Negative for visual disturbance  Respiratory: Positive for shortness of breath and wheezing (Uses albuterol at bedtime)  Cardiovascular: Negative for chest pain and leg swelling  Gastrointestinal: Negative for abdominal pain, constipation and diarrhea  Genitourinary: Negative for difficulty urinating ( no incontinence)  Musculoskeletal: Positive for back pain ( gets relief with Tylenol and occasional Voltaren and Parafon Forte)  Negative for arthralgias  Skin: Negative for rash  Neurological: Negative for headaches  Psychiatric/Behavioral: Negative for dysphoric mood  The patient is not nervous/anxious  /68 (BP Location: Left arm, Patient Position: Sitting, Cuff Size: Large)   Pulse 64   Temp 97 6 °F (36 4 °C) (Temporal)   Resp 22   Ht 5' 1 5" (1 562 m)   Wt 77 kg (169 lb 12 8 oz)   SpO2 96%   BMI 31 56 kg/m²      Physical Exam    pleasant female in no acute distress  Ecchymosis below both eyes secondary to recent accident  Mood is bright  Affect appropriate  Lungs are clear  No wheezing  Heart regular with occasional extra beats  2/6 systolic murmur  Controlled rate  Abdomen soft and nontender  No leg edema  A1c 6 1              Current Outpatient Medications:     acetaminophen (Tylenol 8 Hour) 650 mg CR tablet, , Disp: , Rfl:     albuterol (PROVENTIL HFA,VENTOLIN HFA) 90 mcg/act inhaler, Inhale 2 puffs every 4 (four) hours, Disp: , Rfl:     amLODIPine (NORVASC) 10 mg tablet, Take 1 tablet (10 mg total) by mouth daily, Disp: 90 tablet, Rfl: 3    beclomethasone (QVAR) 40 MCG/ACT inhaler, Inhale 1 puff, Disp: , Rfl:     BLACK COHOSH PO, 1 tab twice daily, Disp: , Rfl:     Calcium Carbonate-Vitamin D (CALCIUM 500/D PO), Take 2 tablets by mouth, Disp: , Rfl:     chlorzoxazone (PARAFON FORTE) 500 mg tablet, Take 1 tablet (500 mg total) by mouth 4 (four) times a day as needed for muscle spasms for up to 30 doses, Disp: 30 tablet, Rfl: 3    Cholecalciferol (D-3-5) 5000 units capsule, Take 5,000 Units by mouth daily , Disp: , Rfl:     clobetasol (TEMOVATE) 0 05 % cream, Apply topically Taken as needed, Disp: , Rfl:     clotrimazole-betamethasone (LOTRISONE) 1-0 05 % cream, Apply topically 2 (two) times a day (Patient taking differently: Apply topically 2 (two) times a day Taken as needed), Disp: 30 g, Rfl: 0    Emollient (CERAVE EX), Apply topically, Disp: , Rfl:     ferrous sulfate 325 (65 Fe) mg tablet, Take 1 tablet by mouth, Disp: , Rfl:     fexofenadine (ALLEGRA) 180 MG tablet, Taken as needed, Disp: , Rfl:     hydrOXYzine HCL (ATARAX) 25 mg tablet, Taken as needed, Disp: , Rfl:     levothyroxine 88 mcg tablet, Take 1 tablet (88 mcg total) by mouth daily, Disp: 90 tablet, Rfl: 3    metFORMIN (GLUCOPHAGE) 500 mg tablet, Take 1 tablet (500 mg total) by mouth daily, Disp: 90 tablet, Rfl: 3    multivitamin (THERAGRAN) TABS, Take 1 tablet by mouth, Disp: , Rfl:     Omega-3 Fatty Acids (FISH OIL) 1200 MG CAPS, Take by mouth, Disp: , Rfl:     simvastatin (ZOCOR) 10 mg tablet, Take 1 tablet (10 mg total) by mouth daily, Disp: 90 tablet, Rfl: 3    alendronate (Fosamax) 70 mg tablet, Take 1 tablet (70 mg total) by mouth every 7 days, Disp: 12 tablet, Rfl: 3    diclofenac (VOLTAREN) 75 mg EC tablet, Take 1 tablet (75 mg total) by mouth 2 (two) times a day for 1 dose, Disp: 1 tablet, Rfl: 0    fluticasone (FLONASE) 50 mcg/act nasal spray, 2 sprays into each nostril, Disp: , Rfl:      No problem-specific Assessment & Plan notes found for this encounter  Diagnoses and all orders for this visit:    Type 2 diabetes mellitus without complication, without long-term current use of insulin (HCC)  -     POCT hemoglobin A1c  -     metFORMIN (GLUCOPHAGE) 500 mg tablet; Take 1 tablet (500 mg total) by mouth daily    Acquired hypothyroidism  -     levothyroxine 88 mcg tablet; Take 1 tablet (88 mcg total) by mouth daily    Localized osteoporosis without current pathological fracture  -     alendronate (Fosamax) 70 mg tablet; Take 1 tablet (70 mg total) by mouth every 7 days    Non morbid obesity due to excess calories    Mixed hyperlipidemia  -     simvastatin (ZOCOR) 10 mg tablet; Take 1 tablet (10 mg total) by mouth daily    Benign essential hypertension  -     amLODIPine (NORVASC) 10 mg tablet; Take 1 tablet (10 mg total) by mouth daily    Other orders  -     acetaminophen (Tylenol 8 Hour) 650 mg CR tablet  -     Discontinue: busPIRone (BUSPAR) 7 5 mg tablet;  Take 7 5 mg by mouth 2 (two) times a day        Patient Instructions    Review of extensive medical history  Overall, she is doing quite well  Blood pressure well controlled  Diabetes is well controlled with an A1c today of 6 1  Review of previous blood work  Excellent lipid profile  Probably does not need her 10 mg simvastatin which can also interact with amlodipine but presently, she is not having any difficulty and simvastatin not hurting her  Review of history of osteoporosis which has not been treated over the last year meaning there is no reason to repeat the test   She agrees to a trial of Fosamax 70 mg once weekly on an empty stomach in the upright position  If not tolerated, will consider Prolia which she remembers being on in the past     Recheck in 2 months

## 2021-07-15 NOTE — PATIENT INSTRUCTIONS
Review of extensive medical history  Overall, she is doing quite well  Blood pressure well controlled  Diabetes is well controlled with an A1c today of 6 1  Review of previous blood work  Excellent lipid profile  Probably does not need her 10 mg simvastatin which can also interact with amlodipine but presently, she is not having any difficulty and simvastatin not hurting her  Review of history of osteoporosis which has not been treated over the last year meaning there is no reason to repeat the test   She agrees to a trial of Fosamax 70 mg once weekly on an empty stomach in the upright position  If not tolerated, will consider Prolia which she remembers being on in the past     Recheck in 2 months

## 2021-07-16 ENCOUNTER — TELEPHONE (OUTPATIENT)
Dept: ADMINISTRATIVE | Facility: OTHER | Age: 81
End: 2021-07-16

## 2021-07-16 NOTE — TELEPHONE ENCOUNTER
Upon review of the In Basket request and the patient's chart, initial outreach has been made via fax, please see Contacts section for details        Att x1 Eye    Thank you  Shyann Whitehead

## 2021-07-16 NOTE — TELEPHONE ENCOUNTER
Upon review of the In Basket request we have found as a result of outreach that patient did not have the requested item(s) completed  See contact info    Any additional questions or concerns should be emailed to the Practice Liaisons via [email or web address] email, please do not reply via In Basket      Thank you  Jim Robertson

## 2021-07-16 NOTE — TELEPHONE ENCOUNTER
----- Message from Brittany Tan MA sent at 7/15/2021  2:00 PM EDT -----  Regarding: Request Quality  07/15/21 2:00 PM    Hello, our patient Byron Harris has had Diabetic Eye Exam completed/performed  Please assist in updating the patient chart by making an External outreach to [facility] facility located in [place]      Thank you,  Brittany Tan MA  [facility] PRIMARY CARE

## 2021-07-16 NOTE — LETTER
Diabetic Eye Exam Form    Date Requested: 21  Patient: Jody Maldonado  Patient : 1940   Referring Provider: Adán Zayas MD    Dilated Retinal Exam, Optomap-Iris Exam, or Fundus Photography Done      If   Yes ([place] one TYPE above)         No     Date of Diabetic Eye Exam ______________________________  Left Eye      Exam did show retinopathy    Exam did not show retinopathy         Mild       Moderate       None       Proliferative       Severe     Right Eye     Exam did show retinopathy    Exam did not show retinopathy         Mild       Moderate       None       Proliferative       Severe     Comments _Please fill out all areas that apply _________________________________________________________    Practice Providing Exam ______________________________________________    Exam Performed By (print name) _______________________________________      Provider Signature ___________________________________________________      These reports are needed for  compliance    Please fax this completed form and a copy of the Diabetic Eye Exam report to our office located at [place] [address] as soon as possible to [phone] [facility]: Phone [phone]    We thank you for your assistance in treating our mutual patient

## 2021-07-16 NOTE — TELEPHONE ENCOUNTER
Upon review of the In Basket request we were able to locate, review, and update the patient chart as requested for Medicare AW  Any additional questions or concerns should be emailed to the Practice Liaisons via [email or web address] email, please do not reply via In Basket      Thank you  Bora Fuller

## 2021-07-19 ENCOUNTER — TELEPHONE (OUTPATIENT)
Dept: ADMINISTRATIVE | Facility: OTHER | Age: 81
End: 2021-07-19

## 2021-07-21 ENCOUNTER — TELEPHONE (OUTPATIENT)
Dept: FAMILY MEDICINE CLINIC | Facility: CLINIC | Age: 81
End: 2021-07-21

## 2021-07-21 DIAGNOSIS — E11.9 TYPE 2 DIABETES MELLITUS WITHOUT COMPLICATION, WITHOUT LONG-TERM CURRENT USE OF INSULIN (HCC): Primary | ICD-10-CM

## 2021-07-21 RX ORDER — METFORMIN HYDROCHLORIDE 500 MG/1
500 TABLET, EXTENDED RELEASE ORAL DAILY
Qty: 90 TABLET | Refills: 3 | Status: SHIPPED | OUTPATIENT
Start: 2021-07-21 | End: 2022-08-09 | Stop reason: SDUPTHER

## 2021-07-21 NOTE — TELEPHONE ENCOUNTER
Pharmacy needs a clarification in regards to the metformin 500 they are asking for you to verify if this is change in formulation previous fill for metformin  mg from a different md  Please advise

## 2021-09-15 ENCOUNTER — OFFICE VISIT (OUTPATIENT)
Dept: FAMILY MEDICINE CLINIC | Facility: CLINIC | Age: 81
End: 2021-09-15
Payer: COMMERCIAL

## 2021-09-15 ENCOUNTER — TELEPHONE (OUTPATIENT)
Dept: ADMINISTRATIVE | Facility: OTHER | Age: 81
End: 2021-09-15

## 2021-09-15 VITALS
BODY MASS INDEX: 31.47 KG/M2 | TEMPERATURE: 97.4 F | HEART RATE: 95 BPM | HEIGHT: 62 IN | DIASTOLIC BLOOD PRESSURE: 80 MMHG | RESPIRATION RATE: 18 BRPM | WEIGHT: 171 LBS | SYSTOLIC BLOOD PRESSURE: 136 MMHG

## 2021-09-15 DIAGNOSIS — I10 BENIGN ESSENTIAL HYPERTENSION: Primary | ICD-10-CM

## 2021-09-15 DIAGNOSIS — E11.9 TYPE 2 DIABETES MELLITUS WITHOUT COMPLICATION, WITHOUT LONG-TERM CURRENT USE OF INSULIN (HCC): ICD-10-CM

## 2021-09-15 DIAGNOSIS — E03.9 ACQUIRED HYPOTHYROIDISM: ICD-10-CM

## 2021-09-15 DIAGNOSIS — E78.2 MIXED HYPERLIPIDEMIA: ICD-10-CM

## 2021-09-15 DIAGNOSIS — J45.20 MILD INTERMITTENT ASTHMA WITHOUT COMPLICATION: ICD-10-CM

## 2021-09-15 DIAGNOSIS — M81.6 LOCALIZED OSTEOPOROSIS WITHOUT CURRENT PATHOLOGICAL FRACTURE: ICD-10-CM

## 2021-09-15 PROCEDURE — 1036F TOBACCO NON-USER: CPT | Performed by: FAMILY MEDICINE

## 2021-09-15 PROCEDURE — 1160F RVW MEDS BY RX/DR IN RCRD: CPT | Performed by: FAMILY MEDICINE

## 2021-09-15 PROCEDURE — 99214 OFFICE O/P EST MOD 30 MIN: CPT | Performed by: FAMILY MEDICINE

## 2021-09-15 PROCEDURE — 3075F SYST BP GE 130 - 139MM HG: CPT | Performed by: FAMILY MEDICINE

## 2021-09-15 PROCEDURE — 3079F DIAST BP 80-89 MM HG: CPT | Performed by: FAMILY MEDICINE

## 2021-09-15 RX ORDER — ALBUTEROL SULFATE 90 UG/1
2 AEROSOL, METERED RESPIRATORY (INHALATION) EVERY 4 HOURS
Qty: 54 G | Refills: 3 | Status: SHIPPED | OUTPATIENT
Start: 2021-09-15 | End: 2021-09-17 | Stop reason: SDUPTHER

## 2021-09-15 NOTE — PROGRESS NOTES
Chief Complaint   Patient presents with    Follow-up     2 month        HPI   Here for follow-up of hypertension, diabetes, hyperlipidemia, osteoporosis, and hypothyroidism  She has started Fosamax with no side effects  Taking 1 tablet weekly on an empty stomach in the upright position  Continues to be active  Swimming ended at [place]  Past Medical History:   Diagnosis Date    Acquired hypothyroidism 1/7/2010    Anxiety state 7/29/2010    Chronic low back pain 6/3/2015    Eczema 3/11/2014    Hearing loss 1/14/2014    Hyperlipidemia 6/3/2015    Hypertension     Iron deficiency anemia 4/6/2010    Mild persistent asthma without complication 4/5/6458    Formatting of this note might be different from the original  ICD10 clean up    Non morbid obesity due to excess calories 9/6/2017    Osteoarthritis 11/9/2015    Osteoporosis     Rectocele     grade 2    Skin cancer     treating with dermatologist    Type 2 diabetes mellitus without complication, without long-term current use of insulin ([facility] ) 12/2/2018    Vitamin D deficiency 7/17/2013        Past Surgical History:   Procedure Laterality Date    APPENDECTOMY      CATARACT EXTRACTION Bilateral     and lens implants    CHOLECYSTECTOMY      KNEE SURGERY Bilateral     Revision of left knee 2006   Tracy Dominguez    MYRINGOTOMY W/ TUBES Right 02/08/2021    tube insertion by Dr Naila Christine      age 41    TUBAL LIGATION         Social History     Tobacco Use    Smoking status: Never Smoker    Smokeless tobacco: Never Used   Substance Use Topics    Alcohol use: Never       Social History     Social History Narrative     since 1968  2 children  Daughter in [place]  Son is a manager at [employer]       3 grandsons   was a  at [employer]  Enjoys reading, scrap booking, swimming      Swims on a regular basis during the summer for 30 minutes  The following portions of the patient's history were reviewed and updated as appropriate: allergies, current medications, past family history, past medical history, past social history, past surgical history and problem list       Review of Systems   Constitutional: Negative for activity change and appetite change  HENT: Negative for ear pain and hearing loss  Eyes: Negative for visual disturbance  Respiratory: Negative for shortness of breath and wheezing  Cardiovascular: Negative for chest pain and leg swelling  Gastrointestinal: Negative for abdominal pain, constipation and diarrhea  Genitourinary: Negative for difficulty urinating  Musculoskeletal: Negative for arthralgias and back pain  Skin: Negative for rash  Neurological: Negative for headaches  Psychiatric/Behavioral: Negative for dysphoric mood  The patient is not nervous/anxious  /80 (BP Location: Left arm, Patient Position: Sitting, Cuff Size: Standard)   Pulse 95   Temp (!) 97 4 °F (36 3 °C)   Resp 18   Ht 5' 1 5" (1 562 m)   Wt 77 6 kg (171 lb)   BMI 31 79 kg/m²      Physical Exam  Cardiovascular:      Pulses: no weak pulses          Dorsalis pedis pulses are 2+ on the right side and 2+ on the left side  Feet:      Right foot:      Skin integrity: No ulcer, skin breakdown, erythema, warmth, callus or dry skin  Left foot:      Skin integrity: No ulcer, skin breakdown, erythema, warmth, callus or dry skin  Appears well  Lungs are clear  Heart regular with a 2/6 systolic murmur  Abdomen nontender  No edema  Patient's shoes and socks removed  Right Foot/Ankle   Right Foot Inspection  Skin Exam: skin normal and skin intact no dry skin, no warmth, no callus, no erythema, no maceration, no abnormal color, no pre-ulcer, no ulcer and no callus                            Sensory       Monofilament testing: intact  Vascular    The right DP pulse is 2+      Left Foot/Ankle  Left Foot Inspection  Skin Exam: skin normal and skin intactno dry skin, no warmth, no erythema, no maceration, normal color, no pre-ulcer, no ulcer and no callus                                         Sensory       Monofilament: intact  Vascular    The left DP pulse is 2+  Assign Risk Category:  No deformity present; No loss of protective sensation; No weak pulses       Risk: 0    BMI Counseling: Body mass index is 31 79 kg/m²  The BMI is above normal  Nutrition recommendations include encouraging healthy choices of fruits and vegetables, consuming healthier snacks and limiting drinks that contain sugar  Exercise recommendations include moderate physical activity 150 minutes/week  Rationale for BMI follow-up plan is due to patient being overweight or obese                Current Outpatient Medications:     acetaminophen (Tylenol 8 Hour) 650 mg CR tablet, , Disp: , Rfl:     albuterol (PROVENTIL HFA,VENTOLIN HFA) 90 mcg/act inhaler, Inhale 2 puffs every 4 (four) hours, Disp: 54 g, Rfl: 3    alendronate (Fosamax) 70 mg tablet, Take 1 tablet (70 mg total) by mouth every 7 days, Disp: 12 tablet, Rfl: 3    amLODIPine (NORVASC) 10 mg tablet, Take 1 tablet (10 mg total) by mouth daily, Disp: 90 tablet, Rfl: 3    beclomethasone (QVAR) 40 MCG/ACT inhaler, Inhale 1 puff, Disp: , Rfl:     BLACK COHOSH PO, 1 tab twice daily, Disp: , Rfl:     Calcium Carbonate-Vitamin D (CALCIUM 500/D PO), Take 2 tablets by mouth, Disp: , Rfl:     Cholecalciferol (D-3-5) 5000 units capsule, Take 5,000 Units by mouth daily , Disp: , Rfl:     clobetasol (TEMOVATE) 0 05 % cream, Apply topically Taken as needed, Disp: , Rfl:     clotrimazole-betamethasone (LOTRISONE) 1-0 05 % cream, Apply topically 2 (two) times a day (Patient taking differently: Apply topically 2 (two) times a day Taken as needed), Disp: 30 g, Rfl: 0    diclofenac (VOLTAREN) 75 mg EC tablet, Take 1 tablet (75 mg total) by mouth 2 (two) times a day for 1 dose, Disp: 1 tablet, Rfl: 0    Emollient (CERAVE EX), Apply topically, Disp: , Rfl:     ferrous sulfate 325 (65 Fe) mg tablet, Take 1 tablet by mouth, Disp: , Rfl:     fexofenadine (ALLEGRA) 180 MG tablet, Taken as needed, Disp: , Rfl:     fluticasone (FLONASE) 50 mcg/act nasal spray, 2 sprays into each nostril, Disp: , Rfl:     hydrOXYzine HCL (ATARAX) 25 mg tablet, Taken as needed, Disp: , Rfl:     levothyroxine 88 mcg tablet, Take 1 tablet (88 mcg total) by mouth daily, Disp: 90 tablet, Rfl: 3    metFORMIN (GLUCOPHAGE-XR) 500 mg 24 hr tablet, Take 1 tablet (500 mg total) by mouth daily, Disp: 90 tablet, Rfl: 3    multivitamin (THERAGRAN) TABS, Take 1 tablet by mouth, Disp: , Rfl:     Omega-3 Fatty Acids (FISH OIL) 1200 MG CAPS, Take by mouth, Disp: , Rfl:     simvastatin (ZOCOR) 10 mg tablet, Take 1 tablet (10 mg total) by mouth daily, Disp: 90 tablet, Rfl: 3    chlorzoxazone (PARAFON FORTE) 500 mg tablet, Take 1 tablet (500 mg total) by mouth 4 (four) times a day as needed for muscle spasms for up to 30 doses, Disp: 30 tablet, Rfl: 3     No problem-specific Assessment & Plan notes found for this encounter  Diagnoses and all orders for this visit:    Benign essential hypertension    Mild intermittent asthma without complication  -     albuterol (PROVENTIL HFA,VENTOLIN HFA) 90 mcg/act inhaler; Inhale 2 puffs every 4 (four) hours    Type 2 diabetes mellitus without complication, without long-term current use of insulin ([facility])    Acquired hypothyroidism    Localized osteoporosis without current pathological fracture    Mixed hyperlipidemia    Other orders  -     Cancel: Ambulatory referral to Optometry; Future        Patient Instructions   Appears to be doing quite well  Tolerating Fosamax okay  Had her flu shot  Medications are reviewed and remain the same  Return on November 16th when her  also has an appointment  Will check A1c at that time    Advised that she does not need to check sugars at home

## 2021-09-15 NOTE — PATIENT INSTRUCTIONS
Appears to be doing quite well  Tolerating Fosamax okay  Had her flu shot  Medications are reviewed and remain the same  Return on November 16th when her  also has an appointment  Will check A1c at that time  Advised that she does not need to check sugars at home

## 2021-09-17 DIAGNOSIS — J45.20 MILD INTERMITTENT ASTHMA WITHOUT COMPLICATION: ICD-10-CM

## 2021-09-17 RX ORDER — ALBUTEROL SULFATE 90 UG/1
2 AEROSOL, METERED RESPIRATORY (INHALATION) EVERY 4 HOURS
Qty: 54 G | Refills: 3 | Status: SHIPPED | OUTPATIENT
Start: 2021-09-17

## 2021-10-28 ENCOUNTER — TELEPHONE (OUTPATIENT)
Dept: FAMILY MEDICINE CLINIC | Facility: CLINIC | Age: 81
End: 2021-10-28

## 2021-10-29 DIAGNOSIS — M81.6 LOCALIZED OSTEOPOROSIS WITHOUT CURRENT PATHOLOGICAL FRACTURE: ICD-10-CM

## 2021-10-29 DIAGNOSIS — E11.9 TYPE 2 DIABETES MELLITUS WITHOUT COMPLICATION, WITHOUT LONG-TERM CURRENT USE OF INSULIN (HCC): Primary | ICD-10-CM

## 2021-10-29 DIAGNOSIS — E03.9 ACQUIRED HYPOTHYROIDISM: ICD-10-CM

## 2021-10-29 DIAGNOSIS — R73.03 PREDIABETES: ICD-10-CM

## 2021-10-29 DIAGNOSIS — E78.2 MIXED HYPERLIPIDEMIA: ICD-10-CM

## 2021-11-02 ENCOUNTER — LAB (OUTPATIENT)
Dept: LAB | Facility: CLINIC | Age: 81
End: 2021-11-02
Payer: COMMERCIAL

## 2021-11-02 DIAGNOSIS — E03.9 ACQUIRED HYPOTHYROIDISM: ICD-10-CM

## 2021-11-02 DIAGNOSIS — M81.6 LOCALIZED OSTEOPOROSIS WITHOUT CURRENT PATHOLOGICAL FRACTURE: ICD-10-CM

## 2021-11-02 DIAGNOSIS — E11.9 TYPE 2 DIABETES MELLITUS WITHOUT COMPLICATION, WITHOUT LONG-TERM CURRENT USE OF INSULIN (HCC): ICD-10-CM

## 2021-11-02 DIAGNOSIS — E78.2 MIXED HYPERLIPIDEMIA: ICD-10-CM

## 2021-11-02 LAB
CHOLEST SERPL-MCNC: 189 MG/DL (ref 50–200)
EST. AVERAGE GLUCOSE BLD GHB EST-MCNC: 137 MG/DL
HBA1C MFR BLD: 6.4 %
HDLC SERPL-MCNC: 89 MG/DL
LDLC SERPL CALC-MCNC: 80 MG/DL (ref 0–100)
NONHDLC SERPL-MCNC: 100 MG/DL
T4 FREE SERPL-MCNC: 1.19 NG/DL (ref 0.76–1.46)
TRIGL SERPL-MCNC: 100 MG/DL
TSH SERPL DL<=0.05 MIU/L-ACNC: 1.55 UIU/ML (ref 0.36–3.74)

## 2021-11-02 PROCEDURE — 82652 VIT D 1 25-DIHYDROXY: CPT

## 2021-11-02 PROCEDURE — 84443 ASSAY THYROID STIM HORMONE: CPT

## 2021-11-02 PROCEDURE — 83036 HEMOGLOBIN GLYCOSYLATED A1C: CPT

## 2021-11-02 PROCEDURE — 80061 LIPID PANEL: CPT

## 2021-11-02 PROCEDURE — 36415 COLL VENOUS BLD VENIPUNCTURE: CPT

## 2021-11-02 PROCEDURE — 84439 ASSAY OF FREE THYROXINE: CPT

## 2021-11-04 LAB — 1,25(OH)2D3 SERPL-MCNC: 46.1 PG/ML (ref 19.9–79.3)

## 2021-11-09 ENCOUNTER — RA CDI HCC (OUTPATIENT)
Dept: OTHER | Facility: HOSPITAL | Age: 81
End: 2021-11-09

## 2021-11-17 ENCOUNTER — OFFICE VISIT (OUTPATIENT)
Dept: FAMILY MEDICINE CLINIC | Facility: CLINIC | Age: 81
End: 2021-11-17
Payer: COMMERCIAL

## 2021-11-17 VITALS
DIASTOLIC BLOOD PRESSURE: 80 MMHG | SYSTOLIC BLOOD PRESSURE: 118 MMHG | WEIGHT: 169 LBS | BODY MASS INDEX: 31.1 KG/M2 | TEMPERATURE: 97.4 F | HEART RATE: 74 BPM | HEIGHT: 62 IN | OXYGEN SATURATION: 98 %

## 2021-11-17 DIAGNOSIS — I10 BENIGN ESSENTIAL HYPERTENSION: ICD-10-CM

## 2021-11-17 DIAGNOSIS — M54.50 CHRONIC LOW BACK PAIN WITHOUT SCIATICA, UNSPECIFIED BACK PAIN LATERALITY: ICD-10-CM

## 2021-11-17 DIAGNOSIS — E78.2 MIXED HYPERLIPIDEMIA: ICD-10-CM

## 2021-11-17 DIAGNOSIS — J45.20 MILD INTERMITTENT ASTHMA WITHOUT COMPLICATION: ICD-10-CM

## 2021-11-17 DIAGNOSIS — G89.29 CHRONIC LOW BACK PAIN WITHOUT SCIATICA, UNSPECIFIED BACK PAIN LATERALITY: ICD-10-CM

## 2021-11-17 DIAGNOSIS — E03.9 ACQUIRED HYPOTHYROIDISM: ICD-10-CM

## 2021-11-17 DIAGNOSIS — Z12.31 ENCOUNTER FOR SCREENING MAMMOGRAM FOR MALIGNANT NEOPLASM OF BREAST: Primary | ICD-10-CM

## 2021-11-17 DIAGNOSIS — M81.6 LOCALIZED OSTEOPOROSIS WITHOUT CURRENT PATHOLOGICAL FRACTURE: ICD-10-CM

## 2021-11-17 DIAGNOSIS — E11.9 TYPE 2 DIABETES MELLITUS WITHOUT COMPLICATION, WITHOUT LONG-TERM CURRENT USE OF INSULIN (HCC): ICD-10-CM

## 2021-11-17 DIAGNOSIS — E66.09 NON MORBID OBESITY DUE TO EXCESS CALORIES: ICD-10-CM

## 2021-11-17 PROCEDURE — 99214 OFFICE O/P EST MOD 30 MIN: CPT | Performed by: FAMILY MEDICINE

## 2021-11-17 PROCEDURE — 1036F TOBACCO NON-USER: CPT | Performed by: FAMILY MEDICINE

## 2021-11-17 PROCEDURE — 1160F RVW MEDS BY RX/DR IN RCRD: CPT | Performed by: FAMILY MEDICINE

## 2021-11-17 PROCEDURE — 3079F DIAST BP 80-89 MM HG: CPT | Performed by: FAMILY MEDICINE

## 2021-11-17 PROCEDURE — 3074F SYST BP LT 130 MM HG: CPT | Performed by: FAMILY MEDICINE

## 2021-11-18 ENCOUNTER — TELEPHONE (OUTPATIENT)
Dept: ADMINISTRATIVE | Facility: OTHER | Age: 81
End: 2021-11-18

## 2021-11-21 LAB
LEFT EYE DIABETIC RETINOPATHY: NORMAL
RIGHT EYE DIABETIC RETINOPATHY: NORMAL

## 2022-03-30 ENCOUNTER — HOSPITAL ENCOUNTER (OUTPATIENT)
Dept: MAMMOGRAPHY | Facility: MEDICAL CENTER | Age: 82
Discharge: HOME | End: 2022-03-30
Payer: COMMERCIAL

## 2022-03-30 VITALS — HEIGHT: 62 IN | BODY MASS INDEX: 31.1 KG/M2 | WEIGHT: 169 LBS

## 2022-03-30 DIAGNOSIS — Z12.31 ENCOUNTER FOR SCREENING MAMMOGRAM FOR MALIGNANT NEOPLASM OF BREAST: ICD-10-CM

## 2022-03-30 PROCEDURE — 77067 SCR MAMMO BI INCL CAD: CPT

## 2022-03-30 PROCEDURE — 77063 BREAST TOMOSYNTHESIS BI: CPT

## 2022-05-09 ENCOUNTER — RA CDI HCC (OUTPATIENT)
Dept: OTHER | Facility: HOSPITAL | Age: 82
End: 2022-05-09

## 2022-05-09 NOTE — PROGRESS NOTES
[facility]  coding opportunities       Chart reviewed, no opportunity found: CHART REVIEWED, NO OPPORTUNITY FOUND        Patients Insurance        Commercial Insurance: [address] [place] [address]

## 2022-05-10 ENCOUNTER — TELEPHONE (OUTPATIENT)
Dept: FAMILY MEDICINE CLINIC | Facility: CLINIC | Age: 82
End: 2022-05-10

## 2022-05-10 NOTE — TELEPHONE ENCOUNTER
Patient is requesting routine labs to be ordered ahead of her upcoming visit (Monday 5/16/22) so that the results can be discussed at that time  Please advise if they are necessary and/or will be ordered so patient can prepare to have labs done this week

## 2022-05-16 ENCOUNTER — OFFICE VISIT (OUTPATIENT)
Dept: FAMILY MEDICINE CLINIC | Facility: CLINIC | Age: 82
End: 2022-05-16
Payer: COMMERCIAL

## 2022-05-16 VITALS
HEART RATE: 68 BPM | TEMPERATURE: 96.8 F | DIASTOLIC BLOOD PRESSURE: 80 MMHG | HEIGHT: 62 IN | SYSTOLIC BLOOD PRESSURE: 130 MMHG | BODY MASS INDEX: 30.33 KG/M2 | WEIGHT: 164.8 LBS | OXYGEN SATURATION: 96 %

## 2022-05-16 DIAGNOSIS — I10 BENIGN ESSENTIAL HYPERTENSION: ICD-10-CM

## 2022-05-16 DIAGNOSIS — J45.20 MILD INTERMITTENT ASTHMA WITHOUT COMPLICATION: ICD-10-CM

## 2022-05-16 DIAGNOSIS — E11.9 TYPE 2 DIABETES MELLITUS WITHOUT COMPLICATION, WITHOUT LONG-TERM CURRENT USE OF INSULIN (HCC): Primary | ICD-10-CM

## 2022-05-16 DIAGNOSIS — E03.9 ACQUIRED HYPOTHYROIDISM: ICD-10-CM

## 2022-05-16 DIAGNOSIS — Z00.00 MEDICARE ANNUAL WELLNESS VISIT, SUBSEQUENT: ICD-10-CM

## 2022-05-16 DIAGNOSIS — E78.2 MIXED HYPERLIPIDEMIA: ICD-10-CM

## 2022-05-16 LAB — SL AMB POCT HEMOGLOBIN AIC: 6.1 (ref ?–6.5)

## 2022-05-16 PROCEDURE — G0439 PPPS, SUBSEQ VISIT: HCPCS | Performed by: FAMILY MEDICINE

## 2022-05-16 PROCEDURE — 3075F SYST BP GE 130 - 139MM HG: CPT | Performed by: FAMILY MEDICINE

## 2022-05-16 PROCEDURE — 1170F FXNL STATUS ASSESSED: CPT | Performed by: FAMILY MEDICINE

## 2022-05-16 PROCEDURE — 1125F AMNT PAIN NOTED PAIN PRSNT: CPT | Performed by: FAMILY MEDICINE

## 2022-05-16 PROCEDURE — 99214 OFFICE O/P EST MOD 30 MIN: CPT | Performed by: FAMILY MEDICINE

## 2022-05-16 PROCEDURE — 3079F DIAST BP 80-89 MM HG: CPT | Performed by: FAMILY MEDICINE

## 2022-05-16 PROCEDURE — 83036 HEMOGLOBIN GLYCOSYLATED A1C: CPT | Performed by: FAMILY MEDICINE

## 2022-05-16 PROCEDURE — 3288F FALL RISK ASSESSMENT DOCD: CPT | Performed by: FAMILY MEDICINE

## 2022-05-16 PROCEDURE — 1036F TOBACCO NON-USER: CPT | Performed by: FAMILY MEDICINE

## 2022-05-16 PROCEDURE — 3725F SCREEN DEPRESSION PERFORMED: CPT | Performed by: FAMILY MEDICINE

## 2022-05-16 PROCEDURE — 1160F RVW MEDS BY RX/DR IN RCRD: CPT | Performed by: FAMILY MEDICINE

## 2022-05-16 RX ORDER — FERROUS SULFATE 325(65) MG
TABLET ORAL
Start: 2022-05-16

## 2022-05-16 NOTE — PROGRESS NOTES
Assessment and Plan:     Problem List Items Addressed This Visit     Type 2 diabetes mellitus without complication, without long-term current use of insulin ([address] ) - Primary    Relevant Orders    POCT hemoglobin A1c (Completed)      Other Visit Diagnoses     Medicare annual wellness visit, subsequent        Screening for colorectal cancer               Preventive health issues were discussed with patient, and age appropriate screening tests were ordered as noted in patient's After Visit Summary  Personalized health advice and appropriate referrals for health education or preventive services given if needed, as noted in patient's After Visit Summary  History of Present Illness:     Patient presents for Welcome to Medicare visit       Patient Care Team:  Guy Cosme MD as PCP - General (Family Medicine)  Brianna Rao MD     Review of Systems:     Review of Systems   Problem List:     Patient Active Problem List   Diagnosis    Rectocele    Acquired hypothyroidism    Anxiety state    Benign essential hypertension    Chronic low back pain    Eczema    Hearing loss    History of knee replacement    History of malignant neoplasm of skin    Hyperlipidemia    Mild intermittent asthma without complication    Non morbid obesity due to excess calories    Osteoarthritis    Type 2 diabetes mellitus without complication, without long-term current use of insulin ([address] )    Vitamin D deficiency    Localized osteoporosis without current pathological fracture      Past Medical and Surgical History:     Past Medical History:   Diagnosis Date    Acquired hypothyroidism 1/7/2010    Anxiety state 7/29/2010    Chronic low back pain 6/3/2015    Eczema 3/11/2014    Hearing loss 1/14/2014    Hyperlipidemia 6/3/2015    Hypertension     Iron deficiency anemia 4/6/2010    Mild persistent asthma without complication 01/22/7740    Non morbid obesity due to excess calories 9/6/2017    Osteoarthritis 11/9/2015    Osteoporosis     Rectocele     grade 2    Skin cancer     treating with dermatologist    Type 2 diabetes mellitus without complication, without long-term current use of insulin ([facility] ) 12/02/2018    Vitamin D deficiency 7/17/2013     Past Surgical History:   Procedure Laterality Date    APPENDECTOMY      CATARACT EXTRACTION Bilateral     and lens implants    CHOLECYSTECTOMY      HYSTERECTOMY      age 41    KNEE SURGERY Bilateral     Revision of left knee 2006   Bela Tristan    MYRINGOTOMY W/ TUBES Right 02/08/2021    tube insertion by Dr Katie Renee      age 41    TUBAL LIGATION        Family History:     No family history on file  Social History:     Social History     Socioeconomic History    Marital status: /Civil Union     Spouse name: Not on file    Number of children: Not on file    Years of education: Not on file    Highest education level: Not on file   Occupational History    Not on file   Tobacco Use    Smoking status: Never Smoker    Smokeless tobacco: Never Used   Vaping Use    Vaping Use: Never used   Substance and Sexual Activity    Alcohol use: Never    Drug use: No    Sexual activity: Yes     Partners: Male   Other Topics Concern    Not on file   Social History Narrative     since 1968  2 children  Daughter in [place]  Son is a manager at [employer]       3 grandsons   was a  at [employer]  Enjoys reading, scrap booking, swimming  Swims on a regular basis during the summer for 30 minutes       Social Determinants of Health     Financial Resource Strain: Not on file   Food Insecurity: Not on file   Transportation Needs: Not on file   Physical Activity: Not on file   Stress: Not on file   Social Connections: Not on file   Intimate Partner Violence: Not on file   Housing Stability: Not on file      Medications and Allergies: Current Outpatient Medications   Medication Sig Dispense Refill    acetaminophen (TYLENOL) 650 mg CR tablet       albuterol (PROVENTIL HFA,VENTOLIN HFA) 90 mcg/act inhaler Inhale 2 puffs every 4 (four) hours 54 g 3    alendronate (Fosamax) 70 mg tablet Take 1 tablet (70 mg total) by mouth every 7 days 12 tablet 3    amLODIPine (NORVASC) 10 mg tablet Take 1 tablet (10 mg total) by mouth daily 90 tablet 3    beclomethasone (QVAR) 40 MCG/ACT inhaler Inhale 1 puff      BLACK COHOSH PO 1 tab twice daily      Calcium Carbonate-Vitamin D (CALCIUM 500/D PO) Take 2 tablets by mouth      Cholecalciferol 125 MCG (5000 UT) capsule Take 5,000 Units by mouth daily       clobetasol (TEMOVATE) 0 05 % cream Apply topically Taken as needed      clotrimazole-betamethasone (LOTRISONE) 1-0 05 % cream Apply topically 2 (two) times a day (Patient taking differently: Apply topically 2 (two) times a day Taken as needed) 30 g 0    ferrous sulfate 325 (65 Fe) mg tablet Take 1 tablet by mouth      fluticasone (FLONASE) 50 mcg/act nasal spray 2 sprays into each nostril      levothyroxine 88 mcg tablet Take 1 tablet (88 mcg total) by mouth daily 90 tablet 3    metFORMIN (GLUCOPHAGE-XR) 500 mg 24 hr tablet Take 1 tablet (500 mg total) by mouth daily 90 tablet 3    multivitamin (THERAGRAN) TABS Take 1 tablet by mouth      mupirocin (BACTROBAN) 2 % ointment       Omega-3 Fatty Acids (FISH OIL) 1200 MG CAPS Take by mouth      simvastatin (ZOCOR) 10 mg tablet Take 1 tablet (10 mg total) by mouth daily 90 tablet 3    diclofenac (VOLTAREN) 75 mg EC tablet Take 1 tablet (75 mg total) by mouth 2 (two) times a day for 1 dose 1 tablet 0    Emollient (CERAVE EX) Apply topically      Rhubarb (ESTROVEN COMPLETE PO) Take by mouth       No current facility-administered medications for this visit       Allergies   Allergen Reactions    Latex Anaphylaxis    Ace Inhibitors Other (See Comments)     LISINOPRIL -cough and shortness of breath  Alendronate     Atenolol Other (See Comments)    Beta Adrenergic Blockers Other (See Comments)     SOB,WHEEZING    Lisinopril     Prednisolone Other (See Comments)     PRED FORTE(swollen eyes/cheek-per nurse    Sulfa Antibiotics Other (See Comments)      Reaction unknown    Tobramycin     Trimethoprim      Other reaction(s): Rash    Bacitracin Rash    Nifedipine Palpitations      Immunizations:     Immunization History   Administered Date(s) Administered    COVID-19 MODERNA VACC 0 5 ML IM 03/02/2021, 03/30/2021, 10/15/2021    INFLUENZA 10/28/2002, 11/16/2006, 11/01/2007, 11/03/2008, 09/29/2009, 10/14/2010, 10/20/2011, 11/15/2012, 10/18/2013, 10/14/2014, 09/09/2015, 11/07/2016, 09/06/2017, 10/18/2018, 09/25/2019, 09/08/2020, 09/09/2021    Influenza, high dose seasonal 0 7 mL 11/07/2016    Pneumococcal Conjugate 13-Valent 11/06/2015    Pneumococcal Polysaccharide PPV23 05/17/2012    Td (adult), Unspecified 05/16/2003    Tdap 09/20/2016    influenza, trivalent, adjuvanted 09/08/2020      Health Maintenance:         Topic Date Due    Hepatitis C Screening  Never done    Breast Cancer Screening: Mammogram  03/30/2023    DXA SCAN  10/20/2025         Topic Date Due    DTaP,Tdap,and Td Vaccines (1 - Tdap) 09/20/2016    COVID-19 Vaccine (4 - Booster for [place] series) 02/15/2022      Medicare Screening Tests and Risk Assessments:     Yohan Louis is here for her Subsequent Wellness visit  Health Risk Assessment:   Patient rates overall health as good  Patient feels that their physical health rating is slightly worse  Patient is satisfied with their life  Eyesight was rated as same  Hearing was rated as same  Patient feels that their emotional and mental health rating is same  Patients states they are sometimes angry  Patient states they are sometimes unusually tired/fatigued  Pain experienced in the last 7 days has been some  Patient's pain rating has been 6/10   Patient states that she has experienced no weight loss or gain in last 6 months  Fall Risk Screening: In the past year, patient has experienced: no history of falling in past year      Urinary Incontinence Screening:   Patient has not leaked urine accidently in the last six months  Home Safety:  Patient has trouble with stairs inside or outside of their home  Patient has working smoke alarms and has no working carbon monoxide detector  Home safety hazards include: none  Nutrition:   Current diet is Low Carb  Medications:   Patient is currently taking over-the-counter supplements  OTC medications include: see medication list  Patient is able to manage medications  Activities of Daily Living (ADLs)/Instrumental Activities of Daily Living (IADLs):   Walk and transfer into and out of bed and chair?: Yes  Dress and groom yourself?: Yes    Bathe or shower yourself?: Yes    Feed yourself?  Yes  Do your laundry/housekeeping?: Yes  Manage your money, pay your bills and track your expenses?: Yes  Make your own meals?: Yes    Do your own shopping?: Yes    Previous Hospitalizations:   Any hospitalizations or ED visits within the last 12 months?: No      Advance Care Planning:   Living will: Yes    Advanced directive: Yes    Five wishes given: Yes      PREVENTIVE SCREENINGS      Cardiovascular Screening:    General: Screening Not Indicated and History Lipid Disorder      Diabetes Screening:     General: Screening Not Indicated and History Diabetes      Breast Cancer Screening:     General: Screening Current      Cervical Cancer Screening:    General: Screening Not Indicated      Osteoporosis Screening:    General: Screening Not Indicated and History Osteoporosis      Lung Cancer Screening:     General: Screening Not Indicated    Screening, Brief Intervention, and Referral to Treatment (SBIRT)    Screening    Typical number of drinks in a week: 0    Single Item Drug Screening:  How often have you used an illegal drug (including marijuana) or a prescription medication for non-medical reasons in the past year? never    Single Item Drug Screen Score: 0  Interpretation: Negative screen for possible drug use disorder    No exam data present     Physical Exam:     /80 (BP Location: Left arm, Patient Position: Sitting, Cuff Size: Adult)   Pulse 68   Temp (!) 96 8 °F (36 °C) (Temporal)   Ht 5' 1 5" (1 562 m)   Wt 74 8 kg (164 lb 12 8 oz)   SpO2 96%   BMI 30 63 kg/m²     Physical Exam     Harlan Modi MD

## 2022-05-16 NOTE — PROGRESS NOTES
Chief Complaint   Patient presents with   [facility] Wellness Visit        HPI   Here for follow-up of hypertension, diabetes, hyperlipidemia, osteoporosis, and hypothyroidism  In the last few months, has been seeing Dermatology and having actinic keratoses on her forearms treated with 5 FU and calcipotriene cream   Now using mupirocin  Complains of forgetfulness but  does not think she is too bad  Past Medical History:   Diagnosis Date    Acquired hypothyroidism 1/7/2010    Anxiety state 7/29/2010    Chronic low back pain 6/3/2015    Eczema 3/11/2014    Hearing loss 1/14/2014    Hyperlipidemia 6/3/2015    Hypertension     Iron deficiency anemia 4/6/2010    Mild persistent asthma without complication 15/67/3865    Non morbid obesity due to excess calories 9/6/2017    Osteoarthritis 11/9/2015    Osteoporosis     Rectocele     grade 2    Skin cancer     treating with dermatologist    Type 2 diabetes mellitus without complication, without long-term current use [facility] 12/02/2018    Vitamin D deficiency 7/17/2013        Past Surgical History:   Procedure Laterality Date    APPENDECTOMY      CATARACT EXTRACTION Bilateral     and lens implants    CHOLECYSTECTOMY      HYSTERECTOMY      age 41    KNEE SURGERY Bilateral     Revision of left knee 2006   Robin Rueda    MYRINGOTOMY W/ TUBES Right 02/08/2021    tube insertion by Dr Mina Glaser      age 41    TUBAL LIGATION         Social History     Tobacco Use    Smoking status: Never Smoker    Smokeless tobacco: Never Used   Substance Use Topics    Alcohol use: Never       Social History     Social History Narrative     since 1968  2 children  Daughter in [place]  Son is a manager at [employer]       3 grandsons   was a  at [employer]  Enjoys reading, scrap booking, swimming  Swims on a regular basis during the summer for 30 minutes  The following portions of the patient's history were reviewed and updated as appropriate: allergies, current medications, past family history, past medical history, past social history, past surgical history and problem list       Review of Systems   Constitutional: Negative for activity change and appetite change  HENT: Negative for ear pain and hearing loss  Eyes: Negative for visual disturbance  Respiratory: Negative for shortness of breath and wheezing  Cardiovascular: Negative for chest pain and leg swelling  Gastrointestinal: Negative for abdominal pain, constipation and diarrhea  Genitourinary: Negative for difficulty urinating  Musculoskeletal: Negative for arthralgias and back pain  Skin: Negative for rash  Neurological: Negative for headaches  Psychiatric/Behavioral: Negative for dysphoric mood  The patient is not nervous/anxious  /80   Pulse 68   Temp (!) 96 8 °F (36 °C) (Temporal)   Ht 5' 1 5" (1 562 m)   Wt 74 8 kg (164 lb 12 8 oz)   SpO2 96%   BMI 30 63 kg/m²      Physical Exam     Appears well  Lungs are clear  Heart regular with a 2/6 systolic murmur loudest at the base  Abdomen nontender  No edema  Residual erythema on both forearms  Mood is okay  Affect appropriate  A1c 6 1                Current Outpatient Medications:     acetaminophen (TYLENOL) 650 mg CR tablet, , Disp: , Rfl:     albuterol (PROVENTIL HFA,VENTOLIN HFA) 90 mcg/act inhaler, Inhale 2 puffs every 4 (four) hours, Disp: 54 g, Rfl: 3    alendronate (Fosamax) 70 mg tablet, Take 1 tablet (70 mg total) by mouth every 7 days, Disp: 12 tablet, Rfl: 3    amLODIPine (NORVASC) 10 mg tablet, Take 1 tablet (10 mg total) by mouth daily, Disp: 90 tablet, Rfl: 3    beclomethasone (QVAR) 40 MCG/ACT inhaler, Inhale 1 puff, Disp: , Rfl:     BLACK COHOSH PO, 1 tab twice daily, Disp: , Rfl:     Calcium Carbonate-Vitamin D (CALCIUM 500/D PO), Take 2 tablets by mouth, Disp: , Rfl:     Cholecalciferol 125 MCG (5000 UT) capsule, Take 5,000 Units by mouth daily , Disp: , Rfl:     clobetasol (TEMOVATE) 0 05 % cream, Apply topically Taken as needed, Disp: , Rfl:     clotrimazole-betamethasone (LOTRISONE) 1-0 05 % cream, Apply topically 2 (two) times a day (Patient taking differently: Apply topically 2 (two) times a day Taken as needed), Disp: 30 g, Rfl: 0    ferrous sulfate 325 (65 Fe) mg tablet, One tablet Monday Wednesday Friday, Disp: , Rfl:     fluticasone (FLONASE) 50 mcg/act nasal spray, 2 sprays into each nostril, Disp: , Rfl:     levothyroxine 88 mcg tablet, Take 1 tablet (88 mcg total) by mouth daily, Disp: 90 tablet, Rfl: 3    metFORMIN (GLUCOPHAGE-XR) 500 mg 24 hr tablet, Take 1 tablet (500 mg total) by mouth daily, Disp: 90 tablet, Rfl: 3    multivitamin (THERAGRAN) TABS, Take 1 tablet by mouth, Disp: , Rfl:     mupirocin (BACTROBAN) 2 % ointment, , Disp: , Rfl:     Omega-3 Fatty Acids (FISH OIL) 1200 MG CAPS, Take by mouth, Disp: , Rfl:     simvastatin (ZOCOR) 10 mg tablet, Take 1 tablet (10 mg total) by mouth daily, Disp: 90 tablet, Rfl: 3    diclofenac (VOLTAREN) 75 mg EC tablet, Take 1 tablet (75 mg total) by mouth 2 (two) times a day for 1 dose, Disp: 1 tablet, Rfl: 0     No problem-specific Assessment & Plan notes found for this encounter  Diagnoses and all orders for this visit:    Type 2 diabetes mellitus without complication, without long-term current use of insulin ([facility])  -     POCT hemoglobin A1c  -     ferrous sulfate 325 (65 Fe) mg tablet; One tablet Monday Wednesday Friday  -     Hemoglobin A1C; Future  -     Microalbumin / creatinine urine ratio; Future    Medicare annual wellness visit, subsequent    Benign essential hypertension  -     Comprehensive metabolic panel; Future    Mild intermittent asthma without complication    Acquired hypothyroidism  -     T4, free;  Future  -     TSH, 3rd generation; Future    Mixed hyperlipidemia  -     Lipid panel; Future    Other orders  -     mupirocin (BACTROBAN) 2 % ointment  -     Discontinue: Rhubarb (ESTROVEN COMPLETE PO); Take by mouth        Patient Instructions       Medicare Preventive Visit Patient Instructions  Thank you for completing your Welcome to Medicare Visit or Medicare Annual Wellness Visit today  Your next wellness visit will be due in one year (5/17/2023)  The screening/preventive services that you may require over the next 5-10 years are detailed below  Some tests may not apply to you based off risk factors and/or age  Screening tests ordered at today's visit but not completed yet may show as past due  Also, please note that scanned in results may not display below  Preventive Screenings:  Service Recommendations Previous Testing/Comments   Colorectal Cancer Screening  * Colonoscopy    * Fecal Occult Blood Test (FOBT)/Fecal Immunochemical Test (FIT)  * Fecal DNA/Cologuard Test  * Flexible Sigmoidoscopy Age: 54-65 years old   Colonoscopy: every 10 years (may be performed more frequently if at higher risk)  OR  FOBT/FIT: every 1 year  OR  Cologuard: every 3 years  OR  Sigmoidoscopy: every 5 years  Screening may be recommended earlier than age 48 if at higher risk for colorectal cancer  Also, an individualized decision between you and your healthcare provider will decide whether screening between the ages of 74-80 would be appropriate  Colonoscopy: Not on file  FOBT/FIT: Not on file  Cologuard: Not on file  Sigmoidoscopy: Not on file          Breast Cancer Screening Age: 36 years old  Frequency: every 1-2 years  Not required if history of left and right mastectomy Mammogram: 03/30/2022    Screening Current   Cervical Cancer Screening Between the ages of 21-29, pap smear recommended once every 3 years  Between the ages of 33-67, can perform pap smear with HPV co-testing every 5 years     Recommendations may differ for women with a history of total hysterectomy, cervical cancer, or abnormal pap smears in past  Pap Smear: 09/08/2020    Screening Not Indicated   Hepatitis C Screening Once for adults born between 1945 and 1965  More frequently in patients at high risk for Hepatitis C Hep C Antibody: Not on file        Diabetes Screening 1-2 times per year if you're at risk for diabetes or have pre-diabetes Fasting glucose: No results in last 5 years   A1C: 6 4 %    Screening Not Indicated  History Diabetes   Cholesterol Screening Once every 5 years if you don't have a lipid disorder  May order more often based on risk factors  Lipid panel: 11/02/2021    Screening Not Indicated  History Lipid Disorder     Other Preventive Screenings Covered by Medicare:  1  Abdominal Aortic Aneurysm (AAA) Screening: covered once if your at risk  You're considered to be at risk if you have a family history of AAA  2  Lung Cancer Screening: covers low dose CT scan once per year if you meet all of the following conditions: (1) Age 50-69; (2) No signs or symptoms of lung cancer; (3) Current smoker or have quit smoking within the last 15 years; (4) You have a tobacco smoking history of at least 30 pack years (packs per day multiplied by number of years you smoked); (5) You get a written order from a healthcare provider  3  Glaucoma Screening: covered annually if you're considered high risk: (1) You have diabetes OR (2) Family history of glaucoma OR (3)  aged 48 and older OR (3)  American aged 72 and older  3  Osteoporosis Screening: covered every 2 years if you meet one of the following conditions: (1) You're estrogen deficient and at risk for osteoporosis based off medical history and other findings; (2) Have a vertebral abnormality; (3) On glucocorticoid therapy for more than 3 months; (4) Have primary hyperparathyroidism; (5) On osteoporosis medications and need to assess response to drug therapy     · Last bone density test (DXA Scan): 10/20/2020   5  HIV Screening: covered annually if you're between the age of 15-65  Also covered annually if you are younger than 13 and older than 72 with risk factors for HIV infection  For pregnant patients, it is covered up to 3 times per pregnancy  Immunizations:  Immunization Recommendations   Influenza Vaccine Annual influenza vaccination during flu season is recommended for all persons aged >= 6 months who do not have contraindications   Pneumococcal Vaccine (Prevnar and Pneumovax)  * Prevnar = PCV13  * Pneumovax = PPSV23   Adults 25-60 years old: 1-3 doses may be recommended based on certain risk factors  Adults 72 years old: Prevnar (PCV13) vaccine recommended followed by Pneumovax (PPSV23) vaccine  If already received PPSV23 since turning 65, then PCV13 recommended at least one year after PPSV23 dose  Hepatitis B Vaccine 3 dose series if at intermediate or high risk (ex: diabetes, end stage renal disease, liver disease)   Tetanus (Td) Vaccine - COST NOT COVERED BY MEDICARE PART B Following completion of primary series, a booster dose should be given every 10 years to maintain immunity against tetanus  Td may also be given as tetanus wound prophylaxis  Tdap Vaccine - COST NOT COVERED BY MEDICARE PART B Recommended at least once for all adults  For pregnant patients, recommended with each pregnancy  Shingles Vaccine (Shingrix) - COST NOT COVERED BY MEDICARE PART B  2 shot series recommended in those aged 48 and above     Health Maintenance Due:      Topic Date Due    Hepatitis C Screening  Never done    Breast Cancer Screening: Mammogram  03/30/2023    DXA SCAN  10/20/2025     Immunizations Due:      Topic Date Due    DTaP,Tdap,and Td Vaccines (1 - Tdap) 09/20/2016    COVID-19 Vaccine (4 - Booster for Silver Grout series) 02/15/2022     Advance Directives   What are advance directives? Advance directives are legal documents that state your wishes and plans for medical care   These plans are made ahead of time in case you lose your ability to make decisions for yourself  Advance directives can apply to any medical decision, such as the treatments you want, and if you want to donate organs  What are the types of advance directives? There are many types of advance directives, and each state has rules about how to use them  You may choose a combination of any of the following:  · Living will: This is a written record of the treatment you want  You can also choose which treatments you do not want, which to limit, and which to stop at a certain time  This includes surgery, medicine, IV fluid, and tube feedings  · Durable power of  for healthcare [facility]): This is a written record that states who you want to make healthcare choices for you when you are unable to make them for yourself  This person, called a proxy, is usually a family member or a friend  You may choose more than 1 proxy  · Do not resuscitate (DNR) order:  A DNR order is used in case your heart stops beating or you stop breathing  It is a request not to have certain forms of treatment, such as CPR  A DNR order may be included in other types of advance directives  · Medical directive: This covers the care that you want if you are in a coma, near death, or unable to make decisions for yourself  You can list the treatments you want for each condition  Treatment may include pain medicine, surgery, blood transfusions, dialysis, IV or tube feedings, and a ventilator (breathing machine)  · Values history: This document has questions about your views, beliefs, and how you feel and think about life  This information can help others choose the care that you would choose  Why are advance directives important? An advance directive helps you control your care  Although spoken wishes may be used, it is better to have your wishes written down  Spoken wishes can be misunderstood, or not followed   Treatments may be given even if you do not want them  An advance directive may make it easier for your family to make difficult choices about your care  Weight Management   Why it is important to manage your weight:  Being overweight increases your risk of health conditions such as heart disease, high blood pressure, type 2 diabetes, and certain types of cancer  It can also increase your risk for osteoarthritis, sleep apnea, and other respiratory problems  Aim for a slow, steady weight loss  Even a small amount of weight loss can lower your risk of health problems  How to lose weight safely:  A safe and healthy way to lose weight is to eat fewer calories and get regular exercise  You can lose up about 1 pound a week by decreasing the number of calories you eat by 500 calories each day  Healthy meal plan for weight management:  A healthy meal plan includes a variety of foods, contains fewer calories, and helps you stay healthy  A healthy meal plan includes the following:  · Eat whole-grain foods more often  A healthy meal plan should contain fiber  Fiber is the part of grains, fruits, and vegetables that is not broken down by your body  Whole-grain foods are healthy and provide extra fiber in your diet  Some examples of whole-grain foods are whole-wheat breads and pastas, oatmeal, brown rice, and bulgur  · Eat a variety of vegetables every day  Include dark, leafy greens such as spinach, kale, rae greens, and mustard greens  Eat yellow and orange vegetables such as carrots, sweet potatoes, and winter squash  · Eat a variety of fruits every day  Choose fresh or canned fruit (canned in its own juice or light syrup) instead of juice  Fruit juice has very little or no fiber  · Eat low-fat dairy foods  Drink fat-free (skim) milk or 1% milk  Eat fat-free yogurt and low-fat cottage cheese  Try low-fat cheeses such as mozzarella and other reduced-fat cheeses  · Choose meat and other protein foods that are low in fat    Choose beans or other legumes such as split peas or lentils  Choose fish, skinless poultry (chicken or turkey), or lean cuts of red meat (beef or pork)  Before you cook meat or poultry, cut off any visible fat  · Use less fat and oil  Try baking foods instead of frying them  Add less fat, such as margarine, sour cream, regular salad dressing and mayonnaise to foods  Eat fewer high-fat foods  Some examples of high-fat foods include french fries, doughnuts, ice cream, and cakes  · Eat fewer sweets  Limit foods and drinks that are high in sugar  This includes candy, cookies, regular soda, and sweetened drinks  Exercise:  Exercise at least 30 minutes per day on most days of the week  Some examples of exercise include walking, biking, dancing, and swimming  You can also fit in more physical activity by taking the stairs instead of the elevator or parking farther away from stores  Ask your healthcare provider about the best exercise plan for you  © Copyright [employer] 2018 Information is for End User's use only and may not be sold, redistributed or otherwise used for commercial purposes   All illustrations and images included in CareNotes® are the copyrighted property of [employer]  or [address] [place] [address]

## 2022-05-16 NOTE — PATIENT INSTRUCTIONS
Medicare wellness exam is completed  Had all of her COVID vaccines  Diabetes well controlled with an A1c of 6 1  Blood pressure nicely controlled  Blood work 6 months ago was excellent and will be repeated again in 6 months  Medications are reviewed and remain the same  Recheck in 6 months  Medicare Preventive Visit Patient Instructions  Thank you for completing your Welcome to Medicare Visit or Medicare Annual Wellness Visit today  Your next wellness visit will be due in one year (5/17/2023)  The screening/preventive services that you may require over the next 5-10 years are detailed below  Some tests may not apply to you based off risk factors and/or age  Screening tests ordered at today's visit but not completed yet may show as past due  Also, please note that scanned in results may not display below  Preventive Screenings:  Service Recommendations Previous Testing/Comments   Colorectal Cancer Screening  * Colonoscopy    * Fecal Occult Blood Test (FOBT)/Fecal Immunochemical Test (FIT)  * Fecal DNA/Cologuard Test  * Flexible Sigmoidoscopy Age: 54-65 years old   Colonoscopy: every 10 years (may be performed more frequently if at higher risk)  OR  FOBT/FIT: every 1 year  OR  Cologuard: every 3 years  OR  Sigmoidoscopy: every 5 years  Screening may be recommended earlier than age 48 if at higher risk for colorectal cancer  Also, an individualized decision between you and your healthcare provider will decide whether screening between the ages of 74-80 would be appropriate  Colonoscopy: Not on file  FOBT/FIT: Not on file  Cologuard: Not on file  Sigmoidoscopy: Not on file          Breast Cancer Screening Age: 36 years old  Frequency: every 1-2 years  Not required if history of left and right mastectomy Mammogram: 03/30/2022    Screening Current   Cervical Cancer Screening Between the ages of 21-29, pap smear recommended once every 3 years     Between the ages of 33-67, can perform pap smear with HPV co-testing every 5 years  Recommendations may differ for women with a history of total hysterectomy, cervical cancer, or abnormal pap smears in past  Pap Smear: 09/08/2020    Screening Not Indicated   Hepatitis C Screening Once for adults born between 1945 and 1965  More frequently in patients at high risk for Hepatitis C Hep C Antibody: Not on file        Diabetes Screening 1-2 times per year if you're at risk for diabetes or have pre-diabetes Fasting glucose: No results in last 5 years   A1C: 6 4 %    Screening Not Indicated  History Diabetes   Cholesterol Screening Once every 5 years if you don't have a lipid disorder  May order more often based on risk factors  Lipid panel: 11/02/2021    Screening Not Indicated  History Lipid Disorder     Other Preventive Screenings Covered by Medicare:  Abdominal Aortic Aneurysm (AAA) Screening: covered once if your at risk  You're considered to be at risk if you have a family history of AAA  Lung Cancer Screening: covers low dose CT scan once per year if you meet all of the following conditions: (1) Age 50-69; (2) No signs or symptoms of lung cancer; (3) Current smoker or have quit smoking within the last 15 years; (4) You have a tobacco smoking history of at least 30 pack years (packs per day multiplied by number of years you smoked); (5) You get a written order from a healthcare provider    Glaucoma Screening: covered annually if you're considered high risk: (1) You have diabetes OR (2) Family history of glaucoma OR (3)  aged 48 and older OR (3)  American aged 72 and older  Osteoporosis Screening: covered every 2 years if you meet one of the following conditions: (1) You're estrogen deficient and at risk for osteoporosis based off medical history and other findings; (2) Have a vertebral abnormality; (3) On glucocorticoid therapy for more than 3 months; (4) Have primary hyperparathyroidism; (5) On osteoporosis medications and need to assess response to drug therapy  Last bone density test (DXA Scan): 10/20/2020  HIV Screening: covered annually if you're between the age of 12-76  Also covered annually if you are younger than 13 and older than 72 with risk factors for HIV infection  For pregnant patients, it is covered up to 3 times per pregnancy  Immunizations:  Immunization Recommendations   Influenza Vaccine Annual influenza vaccination during flu season is recommended for all persons aged >= 6 months who do not have contraindications   Pneumococcal Vaccine (Prevnar and Pneumovax)  * Prevnar = PCV13  * Pneumovax = PPSV23   Adults 25-60 years old: 1-3 doses may be recommended based on certain risk factors  Adults 72 years old: Prevnar (PCV13) vaccine recommended followed by Pneumovax (PPSV23) vaccine  If already received PPSV23 since turning 65, then PCV13 recommended at least one year after PPSV23 dose  Hepatitis B Vaccine 3 dose series if at intermediate or high risk (ex: diabetes, end stage renal disease, liver disease)   Tetanus (Td) Vaccine - COST NOT COVERED BY MEDICARE PART B Following completion of primary series, a booster dose should be given every 10 years to maintain immunity against tetanus  Td may also be given as tetanus wound prophylaxis  Tdap Vaccine - COST NOT COVERED BY MEDICARE PART B Recommended at least once for all adults  For pregnant patients, recommended with each pregnancy  Shingles Vaccine (Shingrix) - COST NOT COVERED BY MEDICARE PART B  2 shot series recommended in those aged 48 and above     Health Maintenance Due:      Topic Date Due    Hepatitis C Screening  Never done    Breast Cancer Screening: Mammogram  03/30/2023    DXA SCAN  10/20/2025     Immunizations Due:      Topic Date Due    DTaP,Tdap,and Td Vaccines (1 - Tdap) 09/20/2016    COVID-19 Vaccine (4 - Booster for [facility] [place] series) 02/15/2022     Advance Directives   What are advance directives?   Advance directives are legal documents that state your wishes and plans for medical care  These plans are made ahead of time in case you lose your ability to make decisions for yourself  Advance directives can apply to any medical decision, such as the treatments you want, and if you want to donate organs  What are the types of advance directives? There are many types of advance directives, and each state has rules about how to use them  You may choose a combination of any of the following:  Living will: This is a written record of the treatment you want  You can also choose which treatments you do not want, which to limit, and which to stop at a certain time  This includes surgery, medicine, IV fluid, and tube feedings  Durable power of  for healthcare [facility]): This is a written record that states who you want to make healthcare choices for you when you are unable to make them for yourself  This person, called a proxy, is usually a family member or a friend  You may choose more than 1 proxy  Do not resuscitate (DNR) order:  A DNR order is used in case your heart stops beating or you stop breathing  It is a request not to have certain forms of treatment, such as CPR  A DNR order may be included in other types of advance directives  Medical directive: This covers the care that you want if you are in a coma, near death, or unable to make decisions for yourself  You can list the treatments you want for each condition  Treatment may include pain medicine, surgery, blood transfusions, dialysis, IV or tube feedings, and a ventilator (breathing machine)  Values history: This document has questions about your views, beliefs, and how you feel and think about life  This information can help others choose the care that you would choose  Why are advance directives important? An advance directive helps you control your care  Although spoken wishes may be used, it is better to have your wishes written down  Spoken wishes can be misunderstood, or not followed  Treatments may be given even if you do not want them  An advance directive may make it easier for your family to make difficult choices about your care  Weight Management   Why it is important to manage your weight:  Being overweight increases your risk of health conditions such as heart disease, high blood pressure, type 2 diabetes, and certain types of cancer  It can also increase your risk for osteoarthritis, sleep apnea, and other respiratory problems  Aim for a slow, steady weight loss  Even a small amount of weight loss can lower your risk of health problems  How to lose weight safely:  A safe and healthy way to lose weight is to eat fewer calories and get regular exercise  You can lose up about 1 pound a week by decreasing the number of calories you eat by 500 calories each day  Healthy meal plan for weight management:  A healthy meal plan includes a variety of foods, contains fewer calories, and helps you stay healthy  A healthy meal plan includes the following:  Eat whole-grain foods more often  A healthy meal plan should contain fiber  Fiber is the part of grains, fruits, and vegetables that is not broken down by your body  Whole-grain foods are healthy and provide extra fiber in your diet  Some examples of whole-grain foods are whole-wheat breads and pastas, oatmeal, brown rice, and bulgur  Eat a variety of vegetables every day  Include dark, leafy greens such as spinach, kale, rae greens, and mustard greens  Eat yellow and orange vegetables such as carrots, sweet potatoes, and winter squash  Eat a variety of fruits every day  Choose fresh or canned fruit (canned in its own juice or light syrup) instead of juice  Fruit juice has very little or no fiber  Eat low-fat dairy foods  Drink fat-free (skim) milk or 1% milk  Eat fat-free yogurt and low-fat cottage cheese  Try low-fat cheeses such as mozzarella and other reduced-fat cheeses  Choose meat and other protein foods that are low in fat  Choose beans or other legumes such as split peas or lentils  Choose fish, skinless poultry (chicken or turkey), or lean cuts of red meat (beef or pork)  Before you cook meat or poultry, cut off any visible fat  Use less fat and oil  Try baking foods instead of frying them  Add less fat, such as margarine, sour cream, regular salad dressing and mayonnaise to foods  Eat fewer high-fat foods  Some examples of high-fat foods include french fries, doughnuts, ice cream, and cakes  Eat fewer sweets  Limit foods and drinks that are high in sugar  This includes candy, cookies, regular soda, and sweetened drinks  Exercise:  Exercise at least 30 minutes per day on most days of the week  Some examples of exercise include walking, biking, dancing, and swimming  You can also fit in more physical activity by taking the stairs instead of the elevator or parking farther away from stores  Ask your healthcare provider about the best exercise plan for you  © Copyright [employer] 2018 Information is for End User's use only and may not be sold, redistributed or otherwise used for commercial purposes   All illustrations and images included in CareNotes® are the copyrighted property of [employer]  or [address] [place] [address]

## 2022-08-09 DIAGNOSIS — J45.20 MILD INTERMITTENT ASTHMA WITHOUT COMPLICATION: Primary | ICD-10-CM

## 2022-08-09 DIAGNOSIS — E03.9 ACQUIRED HYPOTHYROIDISM: ICD-10-CM

## 2022-08-09 DIAGNOSIS — E78.2 MIXED HYPERLIPIDEMIA: ICD-10-CM

## 2022-08-09 DIAGNOSIS — I10 BENIGN ESSENTIAL HYPERTENSION: ICD-10-CM

## 2022-08-09 DIAGNOSIS — E11.9 TYPE 2 DIABETES MELLITUS WITHOUT COMPLICATION, WITHOUT LONG-TERM CURRENT USE OF INSULIN (HCC): ICD-10-CM

## 2022-08-09 RX ORDER — LEVOTHYROXINE SODIUM 88 UG/1
88 TABLET ORAL DAILY
Qty: 90 TABLET | Refills: 3 | Status: SHIPPED | OUTPATIENT
Start: 2022-08-09 | End: 2025-09-08

## 2022-08-09 RX ORDER — METFORMIN HYDROCHLORIDE 500 MG/1
500 TABLET, EXTENDED RELEASE ORAL DAILY
Qty: 90 TABLET | Refills: 3 | Status: SHIPPED | OUTPATIENT
Start: 2022-08-09

## 2022-08-09 RX ORDER — SIMVASTATIN 10 MG
10 TABLET ORAL DAILY
Qty: 90 TABLET | Refills: 3 | Status: SHIPPED | OUTPATIENT
Start: 2022-08-09 | End: 2025-10-24

## 2022-08-09 RX ORDER — AMLODIPINE BESYLATE 10 MG/1
10 TABLET ORAL DAILY
Qty: 90 TABLET | Refills: 3 | Status: SHIPPED | OUTPATIENT
Start: 2022-08-09 | End: 2023-09-15

## 2022-11-14 ENCOUNTER — LAB (OUTPATIENT)
Dept: LAB | Facility: CLINIC | Age: 82
End: 2022-11-14

## 2022-11-14 DIAGNOSIS — E11.9 TYPE 2 DIABETES MELLITUS WITHOUT COMPLICATION, WITHOUT LONG-TERM CURRENT USE OF INSULIN (HCC): ICD-10-CM

## 2022-11-14 DIAGNOSIS — E78.2 MIXED HYPERLIPIDEMIA: ICD-10-CM

## 2022-11-14 DIAGNOSIS — I10 BENIGN ESSENTIAL HYPERTENSION: ICD-10-CM

## 2022-11-14 DIAGNOSIS — E03.9 ACQUIRED HYPOTHYROIDISM: ICD-10-CM

## 2022-11-14 LAB
ALBUMIN SERPL BCP-MCNC: 3.6 G/DL (ref 3.5–5)
ALP SERPL-CCNC: 70 U/L (ref 46–116)
ALT SERPL W P-5'-P-CCNC: 19 U/L (ref 12–78)
ANION GAP SERPL CALCULATED.3IONS-SCNC: 5 MMOL/L (ref 4–13)
AST SERPL W P-5'-P-CCNC: 18 U/L (ref 5–45)
BILIRUB SERPL-MCNC: 0.57 MG/DL (ref 0.2–1)
BUN SERPL-MCNC: 12 MG/DL (ref 5–25)
CALCIUM SERPL-MCNC: 10.5 MG/DL (ref 8.3–10.1)
CHLORIDE SERPL-SCNC: 105 MMOL/L (ref 96–108)
CHOLEST SERPL-MCNC: 175 MG/DL
CO2 SERPL-SCNC: 27 MMOL/L (ref 21–32)
CREAT SERPL-MCNC: 0.73 MG/DL (ref 0.6–1.3)
CREAT UR-MCNC: 239 MG/DL
EST. AVERAGE GLUCOSE BLD GHB EST-MCNC: 134 MG/DL
GFR SERPL CREATININE-BSD FRML MDRD: 78 ML/MIN/1.73SQ M
GLUCOSE P FAST SERPL-MCNC: 110 MG/DL (ref 65–99)
HBA1C MFR BLD: 6.3 %
HDLC SERPL-MCNC: 87 MG/DL
LDLC SERPL CALC-MCNC: 66 MG/DL (ref 0–100)
MICROALBUMIN UR-MCNC: 27.7 MG/L (ref 0–20)
MICROALBUMIN/CREAT 24H UR: 12 MG/G CREATININE (ref 0–30)
NONHDLC SERPL-MCNC: 88 MG/DL
POTASSIUM SERPL-SCNC: 4.3 MMOL/L (ref 3.5–5.3)
PROT SERPL-MCNC: 7.2 G/DL (ref 6.4–8.4)
SODIUM SERPL-SCNC: 137 MMOL/L (ref 135–147)
T4 FREE SERPL-MCNC: 1.19 NG/DL (ref 0.76–1.46)
TRIGL SERPL-MCNC: 112 MG/DL
TSH SERPL DL<=0.05 MIU/L-ACNC: 1.13 UIU/ML (ref 0.45–4.5)

## 2022-11-21 ENCOUNTER — OFFICE VISIT (OUTPATIENT)
Dept: FAMILY MEDICINE CLINIC | Facility: CLINIC | Age: 82
End: 2022-11-21

## 2022-11-21 VITALS
OXYGEN SATURATION: 97 % | SYSTOLIC BLOOD PRESSURE: 138 MMHG | HEART RATE: 78 BPM | WEIGHT: 163.4 LBS | BODY MASS INDEX: 30.07 KG/M2 | TEMPERATURE: 98.1 F | DIASTOLIC BLOOD PRESSURE: 82 MMHG | HEIGHT: 62 IN

## 2022-11-21 DIAGNOSIS — E78.2 MIXED HYPERLIPIDEMIA: ICD-10-CM

## 2022-11-21 DIAGNOSIS — E66.09 NON MORBID OBESITY DUE TO EXCESS CALORIES: ICD-10-CM

## 2022-11-21 DIAGNOSIS — M81.6 LOCALIZED OSTEOPOROSIS WITHOUT CURRENT PATHOLOGICAL FRACTURE: ICD-10-CM

## 2022-11-21 DIAGNOSIS — R73.03 PREDIABETES: ICD-10-CM

## 2022-11-21 DIAGNOSIS — I10 BENIGN ESSENTIAL HYPERTENSION: Primary | ICD-10-CM

## 2022-11-21 DIAGNOSIS — E11.9 TYPE 2 DIABETES MELLITUS WITHOUT COMPLICATION, WITHOUT LONG-TERM CURRENT USE OF INSULIN (HCC): ICD-10-CM

## 2022-11-21 DIAGNOSIS — E03.9 ACQUIRED HYPOTHYROIDISM: ICD-10-CM

## 2022-11-21 RX ORDER — ALENDRONATE SODIUM 70 MG/1
70 TABLET ORAL
Qty: 13 TABLET | Refills: 3 | Status: SHIPPED | OUTPATIENT
Start: 2022-11-21

## 2022-11-21 NOTE — PATIENT INSTRUCTIONS
Appears great  Blood work is excellent  Diabetes well controlled  Blood pressure is excellent  Medications are reviewed and stay the same  Had 3rd COVID booster and flu shot in September  Recheck in 6 months

## 2022-11-21 NOTE — PROGRESS NOTES
Chief Complaint   Patient presents with   • Follow-up        HPI   Here for follow-up of hypertension, diabetes, hyperlipidemia, osteoporosis, and hypothyroidism  States that she is doing okay  Blood work done 11/14  A1c 6 3  Cholesterol excellent with HDL of 87  Chemistry normal   Thyroid normal   Memory is not much different than previous  Sees a foot doctor every 2 months  Also has been followed by [facility] Dermat[facility]  Past Medical History:   Diagnosis Date   • Acquired hypothyroidism 1/7/2010   • Anxiety state 7/29/2010   • Chronic low back pain 6/3/2015   • Eczema 3/11/2014   • Hearing loss 1/14/2014   • Hyperlipidemia 6/3/2015   • Hypertension    • Iron deficiency anemia 4/6/2010   • Mild persistent asthma without complication 35/87/8873   • Non morbid obesity due to excess calories 9/6/2017   • Osteoarthritis 11/9/2015   • Osteoporosis    • Rectocele     grade 2   • Skin cancer     treating with dermatologist   • Type 2 diabetes mellitus without complication, without long-term current use of insulin ([facility] [address] ) 12/02/2018   • Vitamin D deficiency 7/17/2013        Past Surgical History:   Procedure Laterality Date   • APPENDECTOMY     • CATARACT EXTRACTION Bilateral     and lens implants   • CHOLECYSTECTOMY     • HYSTERECTOMY      age 41   • KNEE SURGERY Bilateral     Revision of left knee 2006   • [facility]      Dr Jolene Rosa   • MYRINGOTOMY W/ TUBES Right 02/08/2021    tube insertion by Dr Tomi Huff   • TONSILLECTOMY     • TOTAL ABDOMINAL HYSTERECTOMY W/ BILATERAL SALPINGOOPHORECTOMY      age 41   • TUBAL LIGATION         Social History     Tobacco Use   • Smoking status: Never   • Smokeless tobacco: Never   Substance Use Topics   • Alcohol use: Never       Social History     Social History Narrative     since 1968  2 children  Daughter in [place]  Son is a manager at [employer]       3 grandsons   was a  at [employer]      Enjoys reading, scrap booking, swimming  Swims on a regular basis during the summer for 30 minutes  The following portions of the patient's history were reviewed and updated as appropriate: allergies, current medications, past family history, past medical history, past social history, past surgical history and problem list       Review of Systems       /82 (BP Location: Left arm, Patient Position: Sitting, Cuff Size: Standard)   Pulse 78   Temp 98 1 °F (36 7 °C) (Temporal)   Ht 5' 1 5" (1 562 m)   Wt 74 1 kg (163 lb 6 4 oz)   SpO2 97%   BMI 30 37 kg/m²      Physical Exam  Cardiovascular:      Pulses: no weak pulses          Dorsalis pedis pulses are 1+ on the right side and 1+ on the left side  Feet:      Right foot:      Skin integrity: No ulcer, skin breakdown, erythema, warmth, callus or dry skin  Left foot:      Skin integrity: No ulcer, skin breakdown, erythema, warmth, callus or dry skin  Appears well  Lungs are clear  Heart regular with occasional dropped beats or extra beats  Abdomen soft and nontender  No edema  Mood is okay  Affect appropriate  Diabetic Foot Exam    Patient's shoes and socks removed  Right Foot/Ankle   Right Foot Inspection  Skin Exam: skin normal and skin intact  No dry skin, no warmth, no callus, no erythema, no maceration, no abnormal color, no pre-ulcer, no ulcer and no callus  Sensory   Monofilament testing: diminished    Vascular  The right DP pulse is 1+  Left Foot/Ankle  Left Foot Inspection  Skin Exam: skin normal and skin intact  No dry skin, no warmth, no erythema, no maceration, normal color, no pre-ulcer, no ulcer and no callus  Sensory   Monofilament testing: diminished    Vascular  The left DP pulse is 1+       Assign Risk Category  No deformity present  No loss of protective sensation  No weak pulses  Risk: 0                Current Outpatient Medications:   •  acetaminophen (TYLENOL) 650 mg CR tablet, , Disp: , Rfl:   •  albuterol (PROVENTIL HFA,VENTOLIN HFA) 90 mcg/act inhaler, Inhale 2 puffs every 4 (four) hours, Disp: 54 g, Rfl: 3  •  alendronate (Fosamax) 70 mg tablet, Take 1 tablet (70 mg total) by mouth every 7 days, Disp: 13 tablet, Rfl: 3  •  amLODIPine (NORVASC) 10 mg tablet, Take 1 tablet (10 mg total) by mouth daily, Disp: 90 tablet, Rfl: 3  •  beclomethasone (QVAR REDIHALER) 40 MCG/ACT inhaler, Inhale 2 puffs daily Rinse mouth after use , Disp: 10 6 g, Rfl: 3  •  BLACK COHOSH PO, 1 tab twice daily, Disp: , Rfl:   •  Calcium Carbonate-Vitamin D (CALCIUM 500/D PO), Take 2 tablets by mouth, Disp: , Rfl:   •  Cholecalciferol 125 MCG (5000 UT) capsule, Take 5,000 Units by mouth daily , Disp: , Rfl:   •  clobetasol (TEMOVATE) 0 05 % cream, Apply topically Taken as needed, Disp: , Rfl:   •  clotrimazole-betamethasone (LOTRISONE) 1-0 05 % cream, Apply topically 2 (two) times a day (Patient taking differently: Apply topically 2 (two) times a day Taken as needed), Disp: 30 g, Rfl: 0  •  ferrous sulfate 325 (65 Fe) mg tablet, One tablet Monday Wednesday Friday, Disp: , Rfl:   •  fluticasone (FLONASE) 50 mcg/act nasal spray, 2 sprays into each nostril, Disp: , Rfl:   •  levothyroxine 88 mcg tablet, Take 1 tablet (88 mcg total) by mouth daily, Disp: 90 tablet, Rfl: 3  •  metFORMIN (GLUCOPHAGE-XR) 500 mg 24 hr tablet, Take 1 tablet (500 mg total) by mouth daily, Disp: 90 tablet, Rfl: 3  •  multivitamin (THERAGRAN) TABS, Take 1 tablet by mouth, Disp: , Rfl:   •  mupirocin (BACTROBAN) 2 % ointment, , Disp: , Rfl:   •  Omega-3 Fatty Acids (FISH OIL) 1200 MG CAPS, Take by mouth, Disp: , Rfl:   •  simvastatin (ZOCOR) 10 mg tablet, Take 1 tablet (10 mg total) by mouth daily, Disp: 90 tablet, Rfl: 3  •  triamcinolone (KENALOG) 0 1 % ointment, , Disp: , Rfl:      No problem-specific Assessment & Plan notes found for this encounter         Diagnoses and all orders for this visit:    Benign essential hypertension    Type 2 diabetes mellitus without complication, without long-term current use of insulin (HCC)    Mixed hyperlipidemia    Acquired hypothyroidism    Prediabetes    Non morbid obesity due to excess calories    Localized osteoporosis without current pathological fracture  -     alendronate (Fosamax) 70 mg tablet; Take 1 tablet (70 mg total) by mouth every 7 days    Other orders  -     Discontinue: mupirocin (BACTROBAN) 2 % ointment;  (Patient not taking: Reported on 11/21/2022)  -     triamcinolone (KENALOG) 0 1 % ointment        Patient Instructions   Appears great  Blood work is excellent  Diabetes well controlled  Blood pressure is excellent  Medications are reviewed and stay the same  Had 3rd COVID booster and flu shot in September  Recheck in 6 months

## 2023-01-17 DIAGNOSIS — J45.20 MILD INTERMITTENT ASTHMA WITHOUT COMPLICATION: ICD-10-CM

## 2023-01-17 RX ORDER — ALBUTEROL SULFATE 90 UG/1
2 AEROSOL, METERED RESPIRATORY (INHALATION) EVERY 4 HOURS
Qty: 54 G | Refills: 3 | Status: SHIPPED | OUTPATIENT
Start: 2023-01-17

## 2023-01-22 ENCOUNTER — OFFICE VISIT (OUTPATIENT)
Age: 83
End: 2023-01-22

## 2023-01-22 VITALS
DIASTOLIC BLOOD PRESSURE: 78 MMHG | SYSTOLIC BLOOD PRESSURE: 140 MMHG | WEIGHT: 163 LBS | HEIGHT: 62 IN | TEMPERATURE: 98.7 F | OXYGEN SATURATION: 98 % | HEART RATE: 81 BPM | RESPIRATION RATE: 18 BRPM | BODY MASS INDEX: 30 KG/M2

## 2023-01-22 DIAGNOSIS — M77.8 TENDINITIS OF LEFT WRIST: Primary | ICD-10-CM

## 2023-01-22 RX ORDER — PREDNISONE 10 MG/1
TABLET ORAL
Qty: 19 TABLET | Refills: 0 | Status: SHIPPED | OUTPATIENT
Start: 2023-01-22 | End: 2023-01-31

## 2023-01-22 NOTE — PROGRESS NOTES
[facility]        NAME: Lizette Grace is a [de-identified] y o  female  : 1940    MRN: [id number]  DATE: 2023  TIME: 11:46 AM    Assessment and Plan   No primary diagnosis found  No diagnosis found  Patient Instructions     There are no Patient Instructions on file for this visit  Follow up with PCP in 3-5 days  Proceed to  ER if symptoms worsen  Chief Complaint   No chief complaint on file  History of Present Illness       Patient complains of pain left hand and left wrist dorsal aspects with redness and swelling for the past 2 days  Denies similar symptoms in the past   She denies injury  Patient states she is a borderline" diabetic with last hemoglobin A1c of 6 1  She is on metformin and she denies allergy to prednisone  Review of Systems   Review of Systems   Constitutional: Negative for activity change  Gastrointestinal: Negative for abdominal pain  Musculoskeletal: Positive for arthralgias and joint swelling  Negative for back pain, myalgias, neck pain and neck stiffness  Skin: Negative for color change and wound  Neurological: Negative for dizziness, syncope, weakness, light-headedness and headaches           Current Medications       Current Outpatient Medications:   •  acetaminophen (TYLENOL) 650 mg CR tablet, , Disp: , Rfl:   •  albuterol (PROVENTIL HFA,VENTOLIN HFA) 90 mcg/act inhaler, Inhale 2 puffs every 4 (four) hours, Disp: 54 g, Rfl: 3  •  alendronate (Fosamax) 70 mg tablet, Take 1 tablet (70 mg total) by mouth every 7 days, Disp: 13 tablet, Rfl: 3  •  amLODIPine (NORVASC) 10 mg tablet, Take 1 tablet (10 mg total) by mouth daily, Disp: 90 tablet, Rfl: 3  •  beclomethasone (QVAR REDIHALER) 40 MCG/ACT inhaler, Inhale 2 puffs daily Rinse mouth after use , Disp: 10 6 g, Rfl: 3  •  BLACK COHOSH PO, 1 tab twice daily, Disp: , Rfl:   •  Calcium Carbonate-Vitamin D (CALCIUM 500/D PO), Take 2 tablets by mouth, Disp: , Rfl:   •  Cholecalciferol 125 MCG (5000 UT) capsule, Take 5,000 Units by mouth daily , Disp: , Rfl:   •  clobetasol (TEMOVATE) 0 05 % cream, Apply topically Taken as needed, Disp: , Rfl:   •  clotrimazole-betamethasone (LOTRISONE) 1-0 05 % cream, Apply topically 2 (two) times a day (Patient taking differently: Apply topically 2 (two) times a day Taken as needed), Disp: 30 g, Rfl: 0  •  ferrous sulfate 325 (65 Fe) mg tablet, One tablet Monday Wednesday Friday, Disp: , Rfl:   •  fluticasone (FLONASE) 50 mcg/act nasal spray, 2 sprays into each nostril, Disp: , Rfl:   •  levothyroxine 88 mcg tablet, Take 1 tablet (88 mcg total) by mouth daily, Disp: 90 tablet, Rfl: 3  •  metFORMIN (GLUCOPHAGE-XR) 500 mg 24 hr tablet, Take 1 tablet (500 mg total) by mouth daily, Disp: 90 tablet, Rfl: 3  •  multivitamin (THERAGRAN) TABS, Take 1 tablet by mouth, Disp: , Rfl:   •  mupirocin (BACTROBAN) 2 % ointment, , Disp: , Rfl:   •  Omega-3 Fatty Acids (FISH OIL) 1200 MG CAPS, Take by mouth, Disp: , Rfl:   •  simvastatin (ZOCOR) 10 mg tablet, Take 1 tablet (10 mg total) by mouth daily, Disp: 90 tablet, Rfl: 3  •  triamcinolone (KENALOG) 0 1 % ointment, , Disp: , Rfl:     Current Allergies     Allergies as of 01/22/2023 - Reviewed 11/21/2022   Allergen Reaction Noted   • Alendronate Anaphylaxis 11/09/2015   • Latex Anaphylaxis 11/09/2015   • Ace inhibitors Other (See Comments)    • Atenolol Other (See Comments) 04/23/2018   • Beta adrenergic blockers Other (See Comments) 11/09/2015   • Lisinopril  04/23/2018   • Prednisolone Other (See Comments)    • Sulfa antibiotics Other (See Comments) 05/28/2015   • Tobramycin  04/23/2018   • Trimethoprim  04/23/2018   • Bacitracin Rash 11/09/2015   • Nifedipine Palpitations 05/28/2015            The following portions of the patient's history were reviewed and updated as appropriate: allergies, current medications, past family history, past medical history, past social history, past surgical history and problem list      Past Medical History: Diagnosis Date   • Acquired hypothyroidism 1/7/2010   • Anxiety state 7/29/2010   • Chronic low back pain 6/3/2015   • Eczema 3/11/2014   • Hearing loss 1/14/2014   • Hyperlipidemia 6/3/2015   • Hypertension    • Iron deficiency anemia 4/6/2010   • Mild persistent asthma without complication 13/47/6432   • Non morbid obesity due to excess calories 9/6/2017   • Osteoarthritis 11/9/2015   • Osteoporosis    • Rectocele     grade 2   • Skin cancer     treating with dermatologist   • Type 2 diabetes mellitus without complication, without long[facility]term [facility] ) 12/02/2018   • Vitamin D deficiency 7/17/2013       Past Surgical History:   Procedure Laterality Date   • APPENDECTOMY     • CATARACT EXTRACTION Bilateral     and lens implants   • CHOLECYSTECTOMY     • HYSTERECTOMY      age 41   • KNEE SURGERY Bilateral     Revision of left knee 2006   • Leandra Wu   • MYRINGOTOMY W/ TUBES Right 02/08/2021    tube insertion by Dr Jeannette Brunner   • TONSILLECTOMY     • TOTAL ABDOMINAL HYSTERECTOMY W/ BILATERAL SALPINGOOPHORECTOMY      age 41   • TUBAL LIGATION         No family history on file  Medications have been verified  Objective   There were no vitals taken for this visit  Physical Exam     Physical Exam  Vitals and nursing note reviewed  Constitutional:       Appearance: She is well-developed  HENT:      Head: Normocephalic and atraumatic  Eyes:      Conjunctiva/sclera: Conjunctivae normal       Pupils: Pupils are equal, round, and reactive to light  Musculoskeletal:         General: Swelling and tenderness present  Cervical back: Normal range of motion and neck supple  Comments: Tender and swollen over extensor tendons left hand dorsum and left wrist dorsum   Skin:     General: Skin is warm and dry  Findings: No erythema, lesion or rash  Neurological:      Mental Status: She is alert and oriented to person, place, and time        Deep Tendon Reflexes: Reflexes normal    Psychiatric:         Mood and Affect: Mood normal          Behavior: Behavior normal          Thought Content:  Thought content normal          Judgment: Judgment normal  04/23/2018   • Bacitracin Rash 11/09/2015   • Nifedipine Palpitations 05/28/2015            The following portions of the patient's history were reviewed and updated as appropriate: allergies, current medications, past family history, past medical history, past social history, past surgical history and problem list      Past Medical History:   Diagnosis Date   • Acquired hypothyroidism 1/7/2010   • Anxiety state 7/29/2010   • Chronic low back pain 6/3/2015   • Eczema 3/11/2014   • Hearing loss 1/14/2014   • Hyperlipidemia 6/3/2015   • Hypertension    • Iron deficiency anemia 4/6/2010   • Mild persistent asthma without complication 44/59/5606   • Non morbid obesity due to excess calories 9/6/2017   • Osteoarthritis 11/9/2015   • Osteoporosis    • Rectocele     grade 2   • Skin cancer     treating with dermatologist   • Type 2 diabetes mellitus without complication, without long-term current use of insulin ([facility] ) 12/02/2018   • Vitamin D deficiency 7/17/2013       Past Surgical History:   Procedure Laterality Date   • APPENDECTOMY     • CATARACT EXTRACTION Bilateral     and lens implants   • CHOLECYSTECTOMY     • HYSTERECTOMY      age 41   • KNEE SURGERY Bilateral     Revision of left knee 2006   • Silvia Flaherty   • MYRINGOTOMY W/ TUBES Right 02/08/2021    tube insertion by Dr Jelena Ascencio   • TONSILLECTOMY     • TOTAL ABDOMINAL HYSTERECTOMY W/ BILATERAL SALPINGOOPHORECTOMY      age 41   • TUBAL LIGATION         Family History   Problem Relation Age of Onset   • Stroke Mother    • Heart disease Father          Medications have been verified  Objective   /78 (BP Location: Right arm, Patient Position: Sitting)   Pulse 81   Temp 98 7 °F (37 1 °C)   Resp 18   Ht 5' 1 5" (1 562 m)   Wt 73 9 kg (163 lb)   SpO2 98%   BMI 30 30 kg/m²        Physical Exam     Physical Exam  Vitals and nursing note reviewed  Constitutional:       Appearance: She is well-developed     HENT:      Head: Normocephalic and atraumatic  Eyes:      Conjunctiva/sclera: Conjunctivae normal       Pupils: Pupils are equal, round, and reactive to light  Musculoskeletal:         General: Swelling and tenderness present  Cervical back: Normal range of motion and neck supple  Comments: Tender and swollen over extensor tendons left hand dorsum and left wrist dorsum   Skin:     General: Skin is warm and dry  Findings: No erythema, lesion or rash  Neurological:      Mental Status: She is alert and oriented to person, place, and time  Deep Tendon Reflexes: Reflexes normal    Psychiatric:         Mood and Affect: Mood normal          Behavior: Behavior normal          Thought Content:  Thought content normal          Judgment: Judgment normal

## 2023-01-22 NOTE — PATIENT INSTRUCTIONS
Apply ice to affected area for the 24 hours following the injury to reduce swelling and inflammation  Encourage pain free movement of the affected arm but do not attempt any activities that will cause pain in that wrist/hand  Do not use a brace or Ace wrap to affected area as that was likely because further irritation and inflammation  If you are diabetic you should adhere strictly to your diabetic diet and monitor blood sugar closely while on prednisone and you should discontinue the prednisone if blood sugar becomes significantly elevated  Avoid nonsteroidal anti-inflammatories like Advil or Aleve while on prednisone  Tendinitis   WHAT YOU NEED TO KNOW:   Tendinitis is painful inflammation or breakdown of your tendons  It may also be called tendinopathy  Tendinitis often occurs in the knee, shoulder, ankle, hip, or elbow  DISCHARGE INSTRUCTIONS:   Medicines:   Pain medicines  such as acetaminophen or NSAIDs may decrease swelling and pain or fever  These medicines are available without a doctor's order  Ask which medicine to take  Ask how much to take and when to take it  Follow directions  Acetaminophen and NSAIDs can cause liver or kidney damage if not taken correctly  If you take blood thinner medicine, always ask your healthcare provider if NSAIDs are safe for you  Always read the medicine label and follow the directions on it before using these medicine  Take your medicine as directed  Contact your healthcare provider if you think your medicine is not helping or if you have side effects  Tell him if you are allergic to any medicine  Keep a list of the medicines, vitamins, and herbs you take  Include the amounts, and when and why you take them  Bring the list or the pill bottles to follow-up visits  Carry your medicine list with you in case of an emergency      Management:   Rest  your tendon as directed to help it heal  Ask your healthcare provider if you need to stop putting weight on your affected area     Ice  helps decrease swelling and pain  Ice may also help prevent tissue damage  Use an ice pack, or put crushed ice in a plastic bag  Cover it with a towel and place it on the affected area for 10 to 15 minutes every hour or as directed  Support devices  such as a cane, splint, shoe insert, or brace may help reduce your pain  Physical therapy  may be ordered by your healthcare provider  This may be used to teach you exercises to help improve movement and strength, and to decrease pain  You may also learn how to improve your posture, and how to lift or exercise correctly  Prevention:   Stretch and warm up  before you exercise  Exercise regularly  to strengthen the muscles around your joint  Ease into an exercise routine for the first 3 weeks to prevent another injury  Ask your healthcare provider about the best exercise plan for you  Rest fully between activities  Use the right equipment  for sports and exercise  Wear braces or tape around weak joints as directed  Follow up with your healthcare provider as directed:  Write down your questions so you remember to ask them during your visits  Contact your healthcare provider if:   You have increased pain even after you take medicine  You have questions or concerns about your condition or care  Return to the emergency department if:   You have increased redness over the joint, or swelling in the joint  You suddenly cannot move your joint  © Copyright [employer] 2022 Information is for End User's use only and may not be sold, redistributed or otherwise used for commercial purposes  All illustrations and images included in CareNotes® are the copyrighted property of A D A M , Inc  or Nadia Brice   The above information is an  only  It is not intended as medical advice for individual conditions or treatments   Talk to your doctor, nurse or pharmacist before following any medical regimen to see if it is safe and effective for you

## 2023-01-31 ENCOUNTER — TELEPHONE (OUTPATIENT)
Dept: OBGYN CLINIC | Facility: HOSPITAL | Age: 83
End: 2023-01-31

## 2023-01-31 ENCOUNTER — OFFICE VISIT (OUTPATIENT)
Age: 83
End: 2023-01-31

## 2023-01-31 VITALS
WEIGHT: 163 LBS | HEIGHT: 61 IN | SYSTOLIC BLOOD PRESSURE: 128 MMHG | BODY MASS INDEX: 30.78 KG/M2 | TEMPERATURE: 98.1 F | HEART RATE: 74 BPM | OXYGEN SATURATION: 99 % | DIASTOLIC BLOOD PRESSURE: 78 MMHG | RESPIRATION RATE: 16 BRPM

## 2023-01-31 DIAGNOSIS — M77.8 WRIST TENDONITIS: Primary | ICD-10-CM

## 2023-01-31 RX ORDER — PREDNISONE 20 MG/1
40 TABLET ORAL DAILY
Qty: 10 TABLET | Refills: 0 | Status: SHIPPED | OUTPATIENT
Start: 2023-01-31 | End: 2023-02-05

## 2023-01-31 NOTE — PATIENT INSTRUCTIONS
Take prednisone as directed until completed  Use wrist support as needed for comfort and support  Motrin and/or Tylenol as needed for pain  Follow-up with orthopedics as needed  Follow up with PCP in 3-5 days  Proceed to  ER if symptoms worsen  Tendinitis   WHAT YOU NEED TO KNOW:   Tendinitis is painful inflammation or breakdown of your tendons  It may also be called tendinopathy  Tendinitis often occurs in the knee, shoulder, ankle, hip, or elbow  DISCHARGE INSTRUCTIONS:   Medicines:   Pain medicines  such as acetaminophen or NSAIDs may decrease swelling and pain or fever  These medicines are available without a doctor's order  Ask which medicine to take  Ask how much to take and when to take it  Follow directions  Acetaminophen and NSAIDs can cause liver or kidney damage if not taken correctly  If you take blood thinner medicine, always ask your healthcare provider if NSAIDs are safe for you  Always read the medicine label and follow the directions on it before using these medicine  Take your medicine as directed  Contact your healthcare provider if you think your medicine is not helping or if you have side effects  Tell him if you are allergic to any medicine  Keep a list of the medicines, vitamins, and herbs you take  Include the amounts, and when and why you take them  Bring the list or the pill bottles to follow-up visits  Carry your medicine list with you in case of an emergency  Management:   Rest  your tendon as directed to help it heal  Ask your healthcare provider if you need to stop putting weight on your affected area  Ice  helps decrease swelling and pain  Ice may also help prevent tissue damage  Use an ice pack, or put crushed ice in a plastic bag  Cover it with a towel and place it on the affected area for 10 to 15 minutes every hour or as directed  Support devices  such as a cane, splint, shoe insert, or brace may help reduce your pain      Physical therapy  may be ordered by your healthcare provider  This may be used to teach you exercises to help improve movement and strength, and to decrease pain  You may also learn how to improve your posture, and how to lift or exercise correctly  Prevention:   Stretch and warm up  before you exercise  Exercise regularly  to strengthen the muscles around your joint  Ease into an exercise routine for the first 3 weeks to prevent another injury  Ask your healthcare provider about the best exercise plan for you  Rest fully between activities  Use the right equipment  for sports and exercise  Wear braces or tape around weak joints as directed  Follow up with your healthcare provider as directed:  Write down your questions so you remember to ask them during your visits  Contact your healthcare provider if:   You have increased pain even after you take medicine  You have questions or concerns about your condition or care  Return to the emergency department if:   You have increased redness over the joint, or swelling in the joint  You suddenly cannot move your joint  © Copyright [employer] 2022 Information is for End User's use only and may not be sold, redistributed or otherwise used for commercial purposes  All illustrations and images included in CareNotes® are the copyrighted property of [employer]  or Nadia Brice   The above information is an  only  It is not intended as medical advice for individual conditions or treatments  Talk to your doctor, nurse or pharmacist before following any medical regimen to see if it is safe and effective for you

## 2023-01-31 NOTE — TELEPHONE ENCOUNTER
Patient is being referred to a orthopedics  Please schedule accordingly      [facility]   [phone]

## 2023-01-31 NOTE — PROGRESS NOTES
[facility]        NAME: Kiel uHrt is a [de-identified] y o  female  : 1940    MRN: [id number]  DATE: 2023  TIME: 6:07 PM    Assessment and Plan   Wrist tendonitis [M77 8]  1  Wrist tendonitis  predniSONE 20 mg tablet    Ambulatory Referral to Orthopedic Surgery            Patient Instructions     Take prednisone as directed until completed  Use wrist support as needed for comfort and support  Motrin and/or Tylenol as needed for pain  Follow-up with orthopedics as needed  Follow up with PCP in 3-5 days  Proceed to  ER if symptoms worsen  Chief Complaint     Chief Complaint   Patient presents with   • pain left wrist      Seen 1 week ago and dx tendonitis of left wrist- was on prednisone and ran out   Cannot sleep due to pain         History of Present Illness       82-year-old female presents with left wrist pain  Patient reports wrist pain has been waxing waning for the past couple weeks  Was recently seen and put on a course of steroids which improved but after she came off the pain returned  Patient continues to have pain in dorsal aspect of wrist   Denies any swelling or erythema  Denies any traumas or injuries to the area  Wrist Pain   The pain is present in the left wrist  This is a new problem  The current episode started 1 to 4 weeks ago  There has been no history of extremity trauma  The problem occurs constantly  The problem has been waxing and waning  The quality of the pain is described as aching  The pain is moderate  Associated symptoms include stiffness  Pertinent negatives include no joint swelling or limited range of motion  The symptoms are aggravated by activity  She has tried cold and rest for the symptoms  The treatment provided mild relief  Her past medical history is significant for osteoarthritis  Review of Systems   Review of Systems   Constitutional: Negative  HENT: Negative  Eyes: Negative  Respiratory: Negative  Cardiovascular: Negative  Gastrointestinal: Negative  Musculoskeletal: Positive for arthralgias and stiffness  Skin: Negative  Neurological: Negative            Current Medications       Current Outpatient Medications:   •  acetaminophen (TYLENOL) 650 mg CR tablet, , Disp: , Rfl:   •  albuterol (PROVENTIL HFA,VENTOLIN HFA) 90 mcg/act inhaler, Inhale 2 puffs every 4 (four) hours, Disp: 54 g, Rfl: 3  •  alendronate (Fosamax) 70 mg tablet, Take 1 tablet (70 mg total) by mouth every 7 days, Disp: 13 tablet, Rfl: 3  •  amLODIPine (NORVASC) 10 mg tablet, Take 1 tablet (10 mg total) by mouth daily, Disp: 90 tablet, Rfl: 3  •  beclomethasone (QVAR REDIHALER) 40 MCG/ACT inhaler, Inhale 2 puffs daily Rinse mouth after use , Disp: 10 6 g, Rfl: 3  •  BLACK COHOSH PO, 1 tab twice daily, Disp: , Rfl:   •  Calcium Carbonate-Vitamin D (CALCIUM 500/D PO), Take 2 tablets by mouth, Disp: , Rfl:   •  Cholecalciferol 125 MCG (5000 UT) capsule, Take 5,000 Units by mouth daily , Disp: , Rfl:   •  clobetasol (TEMOVATE) 0 05 % cream, Apply topically Taken as needed, Disp: , Rfl:   •  clotrimazole-betamethasone (LOTRISONE) 1-0 05 % cream, Apply topically 2 (two) times a day (Patient taking differently: Apply topically 2 (two) times a day Taken as needed), Disp: 30 g, Rfl: 0  •  ferrous sulfate 325 (65 Fe) mg tablet, One tablet Monday Wednesday Friday, Disp: , Rfl:   •  fluticasone (FLONASE) 50 mcg/act nasal spray, 2 sprays into each nostril, Disp: , Rfl:   •  levothyroxine 88 mcg tablet, Take 1 tablet (88 mcg total) by mouth daily, Disp: 90 tablet, Rfl: 3  •  metFORMIN (GLUCOPHAGE-XR) 500 mg 24 hr tablet, Take 1 tablet (500 mg total) by mouth daily, Disp: 90 tablet, Rfl: 3  •  multivitamin (THERAGRAN) TABS, Take 1 tablet by mouth, Disp: , Rfl:   •  mupirocin (BACTROBAN) 2 % ointment, , Disp: , Rfl:   •  Omega-3 Fatty Acids (FISH OIL) 1200 MG CAPS, Take by mouth, Disp: , Rfl:   •  predniSONE 20 mg tablet, Take 2 tablets (40 mg total) by mouth daily for 5 days, Disp: 10 tablet, Rfl: 0  •  simvastatin (ZOCOR) 10 mg tablet, Take 1 tablet (10 mg total) by mouth daily, Disp: 90 tablet, Rfl: 3  •  triamcinolone (KENALOG) 0 1 % ointment, , Disp: , Rfl:     Current Allergies     Allergies as of 01/31/2023 - Reviewed 01/31/2023   Allergen Reaction Noted   • Alendronate Anaphylaxis 11/09/2015   • Latex Anaphylaxis 11/09/2015   • Ace inhibitors Other (See Comments)    • Atenolol Other (See Comments) 04/23/2018   • Beta adrenergic blockers Other (See Comments) 11/09/2015   • Lisinopril  04/23/2018   • Prednisolone Other (See Comments)    • Sulfa antibiotics Other (See Comments) 05/28/2015   • Tobramycin  04/23/2018   • Trimethoprim  04/23/2018   • Bacitracin Rash 11/09/2015   • Nifedipine Palpitations 05/28/2015            The following portions of the patient's history were reviewed and updated as appropriate: allergies, current medications, past family history, past medical history, past social history, past surgical history and problem list      Past Medical History:   Diagnosis Date   • Acquired hypothyroidism 1/7/2010   • Anxiety state 7/29/2010   • Chronic low back pain 6/3/2015   • Eczema 3/11/2014   • Hearing loss 1/14/2014   • Hyperlipidemia 6/3/2015   • Hypertension    • Iron deficiency anemia 4/6/2010   • Mild persistent asthma without complication 54/04/2940   • Non morbid obesity due to excess calories 9/6/2017   • Osteoarthritis 11/9/2015   • Osteoporosis    • Rectocele     grade 2   • Skin cancer     treating with dermatologist   • Type 2 diabetes mellitus without complication, without long-term [facility] use [facility] 12/02/2018   • Vitamin D deficiency 7/17/2013       Past Surgical History:   Procedure Laterality Date   • APPENDECTOMY     • CATARACT EXTRACTION Bilateral     and lens implants   • CHOLECYSTECTOMY     • HYSTERECTOMY      age 41   • KNEE SURGERY Bilateral     Revision of left knee 2006   • Anthony Upton   • MYRINGOTOMY W/ TUBES Right 02/08/2021    tube insertion by Dr Nicole Javed   • TONSILLECTOMY     • TOTAL ABDOMINAL HYSTERECTOMY W/ BILATERAL SALPINGOOPHORECTOMY      age 41   • TUBAL LIGATION         Family History   Problem Relation Age of Onset   • Stroke Mother    • Heart disease Father          Medications have been verified  Objective   /78   Pulse 74   Temp 98 1 °F (36 7 °C)   Resp 16   Ht 5' 1" (1 549 m)   Wt 73 9 kg (163 lb)   SpO2 99%   BMI 30 80 kg/m²   No LMP recorded  Patient has had a hysterectomy  Physical Exam     Physical Exam  Vitals and nursing note reviewed  Constitutional:       General: She is not in acute distress  Appearance: She is well-developed  HENT:      Head: Normocephalic and atraumatic  Right Ear: External ear normal       Left Ear: External ear normal       Nose: Nose normal       Mouth/Throat:      Pharynx: No oropharyngeal exudate  Eyes:      General:         Right eye: No discharge  Left eye: No discharge  Conjunctiva/sclera: Conjunctivae normal    Pulmonary:      Effort: Pulmonary effort is normal  No respiratory distress  Musculoskeletal:         General: Normal range of motion  Left wrist: Tenderness (No palpation to dorsal aspect of wrist   No swelling erythema or deformity noted ) present  No swelling, deformity, effusion, lacerations, bony tenderness, snuff box tenderness or crepitus  Normal range of motion  Normal pulse  Cervical back: Normal range of motion and neck supple  Skin:     General: Skin is warm and dry  Neurological:      Mental Status: She is alert and oriented to person, place, and time

## 2023-02-01 NOTE — TELEPHONE ENCOUNTER
Caller: Patient    Doctor: n/a    Reason for call: Patient returning call  She will call back to schedule because her  is in need of aortic valve surgery  She said her wrist bandage is helping her currently       Call back#: N/A

## 2023-02-08 ENCOUNTER — OFFICE VISIT (OUTPATIENT)
Age: 83
End: 2023-02-08

## 2023-02-08 ENCOUNTER — EVALUATION (OUTPATIENT)
Age: 83
End: 2023-02-08

## 2023-02-08 VITALS
HEIGHT: 62 IN | OXYGEN SATURATION: 98 % | RESPIRATION RATE: 16 BRPM | HEART RATE: 72 BPM | SYSTOLIC BLOOD PRESSURE: 121 MMHG | DIASTOLIC BLOOD PRESSURE: 90 MMHG | WEIGHT: 165 LBS | BODY MASS INDEX: 30.36 KG/M2 | TEMPERATURE: 98.3 F

## 2023-02-08 VITALS — DIASTOLIC BLOOD PRESSURE: 72 MMHG | SYSTOLIC BLOOD PRESSURE: 128 MMHG

## 2023-02-08 DIAGNOSIS — G89.29 WRIST PAIN, CHRONIC, LEFT: ICD-10-CM

## 2023-02-08 DIAGNOSIS — M77.8 LEFT WRIST TENDONITIS: Primary | ICD-10-CM

## 2023-02-08 DIAGNOSIS — M25.532 WRIST PAIN, CHRONIC, LEFT: ICD-10-CM

## 2023-02-08 DIAGNOSIS — R53.83 FATIGUE, UNSPECIFIED TYPE: Primary | ICD-10-CM

## 2023-02-08 LAB
SARS-COV-2 AG UPPER RESP QL IA: NEGATIVE
VALID CONTROL: NORMAL

## 2023-02-08 NOTE — PROGRESS NOTES
PT Evaluation     Today's date: 2023  Patient name: Leatha Kaminski  : 1940  MRN: [id number]  Referring provider: Sada Ramirez MD  Dx:   Encounter Diagnosis     ICD-10-CM    1  Left wrist tendonitis  M77 8       2  Wrist pain, chronic, left  M25 532     G89 29           Start Time: 1110  Stop Time: 1205  Total time in clinic (min): 55 minutes    Assessment  Assessment details: Pt is an [de-identified]year old female 1 month post onset of dorsal L wrist pain  Pt claims no precipitating incident, just "woke with pain" approximately 22  Pt seen 2x in Urgent Care and prescribed 2 courses of prednisone with pain relief, however pain returned as soon as meds were done  No x-ray to date  Pain range 0-8/10  L dorsal wrist is warm and edematous  Pain over extensor tendons, and S-L interval of wrist dorsally  + CMC grind, and tenderness over CMC,  Extreme limitation in AROM of wrist and thumb CMC  All ligamentous testing was painful for dorsal wrist     strength setting #2: R/28#, L/2#,  Lateral pinch: R/6#, L/1#    3 point pinch: R/6#, L/0#  Wrist strength 3-/5  Pain with wrist ext, no pain with finger ext  Very limited use of L hand for daily function due to pain, difficulty dressing, cooking, laundry, opening bottles and jars  DASH 49% disability  Pt would benefit from a course of outpatient hand therapy in order to control pain and edema, and restore ROM, strength and function in non dominant L hand    (R/O extensor tenosynovitis, SL lig disruption, CMC OA, RA)  Impairments: abnormal or restricted ROM, activity intolerance, impaired physical strength, lacks appropriate home exercise program and pain with function  Functional limitations: very limited use l hand, no gripping, lifting, difficulty jars/bottles/dressing, laundry  Symptom irritability: highUnderstanding of Dx/Px/POC: fair   Prognosis: good    Goals  STG- 4 weeks  3/8/23-  1  I HEP  2  Decrease pain range to 0-5/10  3   Increase AROM R wrist to 45 F/E  4  Increase  R wrist to 8-10#, and lateral pinch to 0-3#  5  Improve DASH 10-15points    LTG- 12 weeks 5/3/23  1  Decrease pain range to 0-2/10  2  Increase AROM L wrist to 45-60 F/E  3  Increase /pinch L to % of R  4  Improve DASH to under 20% disability    Plan  Patient would benefit from: custom splinting, orthotics and PT eval  Referral necessary: Yes  Planned modality interventions: thermotherapy: hydrocollator packs  Planned therapy interventions: manual therapy, neuromuscular re-education, orthotic fitting/training, orthotic management and training, therapeutic activities, therapeutic exercise and home exercise program  Frequency: 2x week  Duration in visits: 20  Duration in weeks: 12  Plan of Care beginning date: 2023  Plan of Care expiration date: 5/3/2023  Treatment plan discussed with: patient        Subjective Evaluation    History of Present Illness  Date of onset: 2023  Mechanism of injury: Pt states waking with pain dorsal R wrist on 23  Pt cannot recall any precipitating incident  No x-rays to date  Pt placed on 2 courses of prednisone which resolved pain while she was on it, but pain returned immediately  Pt referred for outpatient PT/hand therapy  Not a recurrent problem   Quality of life: good    Pain  Current pain ratin  At best pain ratin  At worst pain ratin  Quality: tight, knife-like and throbbing  Relieving factors: ice  Aggravating factors: lifting  Progression: worsening    Social Support  Steps to enter house: yes  3  Stairs in house: yes   14  Lives in: multiple-level home  Lives with: spouse    Employment status: not working  Hand dominance: right          Objective     Observations     Left Wrist/Hand   Positive for edema       Additional Observation Details  Dorsal L wrist presents with edema, redness and warmth to touch    Neurological Testing     Sensation     Wrist/Hand   Left   Diminished: light touch    Right   Intact: light touch    Additional Neurological Details  Diminished light touch dorsal wrist L    Active Range of Motion   Left Shoulder   Flexion: 150 degrees   Abduction: 115 degrees     Left Wrist   Wrist flexion: 28 degrees   Wrist extension: 30 degrees   Radial deviation: 10 degrees   Ulnar deviation: 15 degrees     Left Thumb   Extension     CMC: 35 degrees  Palmar Abduction     CMC: 40 degrees  Opposition: Oppose thumb to base of LF    Additional Active Range of Motion Details  Intrinsic stiffness all fingers L hand    Strength/Myotome Testing     Left Wrist/Hand   Wrist extension: 3-  Wrist flexion: 3-     (2nd hand position)     Trial 1: 2    Thumb Strength  Key/Lateral Pinch     Trial 1: 1  Palmar/Three-Point Pinch     Trial 1: 0    Right Wrist/Hand   Wrist extension: 5  Wrist flexion: 5     (2nd hand position)     Trial 1: 28    Thumb Strength   Key/Lateral Pinch     Trial 1: 6  Palmar/Three-Point Pinch     Trial 1: 6    Tests     Left Wrist/Hand   Positive CMC grind, extrinsic extensor tightness, intrinsic muscle tightness, lunotriquetral shear, scapholunate shear and ulnotriquetral shear  Negative Phalen's sign and Das's       Swelling     Left Wrist/Hand   Circumference wrist: 17 2 cm    Right Wrist/Hand   Circumference wrist: 16 2 cm    General Comments:      Wrist/Hand Comments  2/8/23- DASH 49% disability             Precautions: DM, OA, osteoporosis      Manuals 2/8            STM/stretch L wrist/hand NV                                                   Neuro Re-Ed             instructed in use of OTC L wrist immobilization orthosis Pt with some difficulty donning and doffing correctly                                                                                          Ther Ex             AROM wrist open hand and fist in gravity eliminated position 10x open hand  10x fist            Tendon glide hook/full 10x  10x            Putty /pinch NV            Wrist isometrics NV            Thumb CMC etx abd/oppose NV            Sup/pronation NV                                      Ther Activity                                                                              Modalities

## 2023-02-08 NOTE — PROGRESS NOTES
[address] [facility]        NAME: Miguelangel Gonzalez is a [de-identified] y o  female  : 1940    MRN: [id number]  DATE: 2023  TIME: 12:59 PM    Assessment and Plan   Fatigue, unspecified type [R53 83]  1  Fatigue, unspecified type  Poct Covid 19 Rapid Antigen Test      Patient recently completed second course of prednisone taper for tendinitis left wrist   I advised patient that her present fatigue may be due to a wearing off of the stimulatory effects of the prednisone  I explained that there are other causes of fatigue such as underactive thyroid, anemia etc  for which we have no ability to test for here  She does have her initial appointment with a [facility]'s PCP in this building in 2 months  I advised her that if her fatigue continues she should attempt to move up that appointment  Patient Instructions     Patient Instructions     Fatigue   WHAT YOU NEED TO KNOW:   Fatigue is mental and physical exhaustion that does not get better with rest  Fatigue may make daily activities difficult or cause extreme sleepiness  It is normal to feel tired sometimes, but long-term fatigue may be a sign of serious illness  DISCHARGE INSTRUCTIONS:   Return to the emergency department if:   · You have chest pain  · You have difficulty breathing  Contact your healthcare provider if:   · You have a cough that gets worse, or does not go away  · You see blood in your urine or bowel movement  · You have numbness or tingling around your mouth or in an arm or leg  · You faint, feel dizzy, or have vision changes  · You have swelling in your lymph nodes  · You are a woman and have vaginal bleeding that is not normal for you, or is not expected  · You lose weight without trying, or you have trouble eating  · You feel weak or have muscle pain  · You have pain or swelling in your joints  · You have questions or concerns about your condition or care      Follow up with your healthcare provider as directed: You may need more tests  Your healthcare provider may also refer you to a specialist  Write down your questions so you remember to ask them during your visits  Manage fatigue:   · Keep a fatigue diary  Include anything that makes you feel more tired or less tired  Bring the diary with you to follow-up visits with your provider  · Exercise as directed  Exercise can help you feel more alert  Exercise can also help you manage stress or relieve depression  Try to get at least 30 minutes of exercise most days of the week  · Keep a regular sleep schedule  Go to bed and wake up at the same times every day  Limit naps to 1 hour each day  A nap can improve fatigue, but a long nap may make it harder to go to sleep at night  · Plan and limit your activities  Limit the number of activities such as shopping and cleaning you do each day  If possible, try to spread out your trips throughout the week  Plan ahead so you are not rushing to get something done  Only do activities that you have the energy to complete  Take breaks between activities  Ask for help if you need it  Another person may be able to drive you or help with daily activities  · Eat a variety of healthy foods  Healthy foods include fruits, vegetables, whole-grain breads, low-fat dairy products, beans, lean meats, and fish  Good nutrition can help manage fatigue  · Limit caffeine and alcohol  These can make it difficult to fall or stay asleep  Women should limit alcohol to 1 drink a day  Men should limit alcohol to 2 drinks a day  A drink of alcohol is 12 ounces of beer, 5 ounces of wine, or 1½ ounces of liquor  Ask our healthcare provider how much caffeine is safe for you  · Do not smoke  Nicotine and other chemicals in cigarettes and cigars can cause lung damage and increase fatigue  Ask your healthcare provider for information if you currently smoke and need help to quit   E-cigarettes or smokeless tobacco still contain nicotine  Talk to your healthcare provider before you use these products  © Copyright [employer] 2022 Information is for End User's use only and may not be sold, redistributed or otherwise used for commercial purposes  All illustrations and images included in CareNotes® are the copyrighted property of A [employer] A M [employer] Inc  or Nadia Estrella  The above information is an  only  It is not intended as medical advice for individual conditions or treatments  Talk to your doctor, nurse or pharmacist before following any medical regimen to see if it is safe and effective for you  Follow up with PCP in 3-5 days  Proceed to  ER if symptoms worsen  Chief Complaint     Chief Complaint   Patient presents with   • Fatigue     Pt states she has been very tired  Family members have covid and pt is worried she may have gotten covid  History of Present Illness       Patient complains of fatigue for the past few days  Onset after COVID exposure  She denies cough, congestion, sore throat, fevers or chills  Patient recently completed a second prednisone taper for left wrist tendinitis  Patient had her initial physical therapy evaluation for left wrist tendinitis today  Review of Systems   Review of Systems   Constitutional: Positive for fatigue  Negative for appetite change, chills and fever  HENT: Negative for congestion, rhinorrhea, sinus pressure, sore throat, trouble swallowing and voice change  Respiratory: Negative for cough, chest tightness, shortness of breath and wheezing  Cardiovascular: Negative for chest pain  Gastrointestinal: Negative for nausea  Musculoskeletal: Positive for arthralgias  Negative for myalgias           Current Medications       Current Outpatient Medications:   •  acetaminophen (TYLENOL) 650 mg CR tablet, , Disp: , Rfl:   •  albuterol (PROVENTIL HFA,VENTOLIN HFA) 90 mcg/act inhaler, Inhale 2 puffs every 4 (four) hours, Disp: 54 g, Rfl: 3  •  alendronate (Fosamax) 70 mg tablet, Take 1 tablet (70 mg total) by mouth every 7 days, Disp: 13 tablet, Rfl: 3  •  amLODIPine (NORVASC) 10 mg tablet, Take 1 tablet (10 mg total) by mouth daily, Disp: 90 tablet, Rfl: 3  •  BLACK COHOSH PO, 1 tab twice daily, Disp: , Rfl:   •  Calcium Carbonate-Vitamin D (CALCIUM 500/D PO), Take 2 tablets by mouth, Disp: , Rfl:   •  clobetasol (TEMOVATE) 0 05 % cream, Apply topically Taken as needed, Disp: , Rfl:   •  clotrimazole-betamethasone (LOTRISONE) 1-0 05 % cream, Apply topically 2 (two) times a day (Patient taking differently: Apply topically 2 (two) times a day Taken as needed), Disp: 30 g, Rfl: 0  •  ferrous sulfate 325 (65 Fe) mg tablet, One tablet Monday Wednesday Friday, Disp: , Rfl:   •  levothyroxine 88 mcg tablet, Take 1 tablet (88 mcg total) by mouth daily, Disp: 90 tablet, Rfl: 3  •  metFORMIN (GLUCOPHAGE-XR) 500 mg 24 hr tablet, Take 1 tablet (500 mg total) by mouth daily, Disp: 90 tablet, Rfl: 3  •  multivitamin (THERAGRAN) TABS, Take 1 tablet by mouth, Disp: , Rfl:   •  mupirocin (BACTROBAN) 2 % ointment, , Disp: , Rfl:   •  Omega-3 Fatty Acids (FISH OIL) 1200 MG CAPS, Take by mouth, Disp: , Rfl:   •  simvastatin (ZOCOR) 10 mg tablet, Take 1 tablet (10 mg total) by mouth daily, Disp: 90 tablet, Rfl: 3  •  triamcinolone (KENALOG) 0 1 % ointment, , Disp: , Rfl:   •  beclomethasone (QVAR REDIHALER) 40 MCG/ACT inhaler, Inhale 2 puffs daily Rinse mouth after use , Disp: 10 6 g, Rfl: 3  •  Cholecalciferol 125 MCG (5000 UT) capsule, Take 5,000 Units by mouth daily , Disp: , Rfl:   •  fluticasone (FLONASE) 50 mcg/act nasal spray, 2 sprays into each nostril, Disp: , Rfl:     Current Allergies     Allergies as of 02/08/2023 - Reviewed 02/08/2023   Allergen Reaction Noted   • Alendronate Anaphylaxis 11/09/2015   • Latex Anaphylaxis 11/09/2015   • Ace inhibitors Other (See Comments)    • Atenolol Other (See Comments) 04/23/2018   • Beta adrenergic blockers Other (See Comments) 11/09/2015   • Lisinopril  04/23/2018   • Prednisolone Other (See Comments)    • Sulfa antibiotics Other (See Comments) 05/28/2015   • Tobramycin  04/23/2018   • Trimethoprim  04/23/2018   • Bacitracin Rash 11/09/2015   • Nifedipine Palpitations 05/28/2015            The following portions of the patient's history were reviewed and updated as appropriate: allergies, current medications, past family history, past medical history, past social history, past surgical history and problem list      Past Medical History:   Diagnosis Date   • Acquired hypothyroidism 1/7/2010   • Anxiety state 7/29/2010   • Chronic low back pain 6/3/2015   • Eczema 3/11/2014   • Hearing loss 1/14/2014   • Hyperlipidemia 6/3/2015   • Hypertension    • Iron deficiency anemia 4/6/2010   • Mild persistent asthma without complication 14/29/3583   • Non morbid obesity due to excess calories 9/6/2017   • Osteoarthritis 11/9/2015   • Osteoporosis    • Rectocele     grade 2   • Skin cancer     treating with dermatologist   • Type 2 diabetes mellitus without complication, without long-term current use of insulin [facility] ) 12/02/2018   • Vitamin D deficiency 7/17/2013       Past Surgical History:   Procedure Laterality Date   • APPENDECTOMY     • CATARACT EXTRACTION Bilateral     and lens implants   • CHOLECYSTECTOMY     • HYSTERECTOMY      age 41   • KNEE SURGERY Bilateral     Revision of left knee 2006   • Silvia Flaherty   • MYRINGOTOMY W/ TUBES Right 02/08/2021    tube insertion by Dr Jelena Ascencoi   • TONSILLECTOMY     • TOTAL ABDOMINAL HYSTERECTOMY W/ BILATERAL SALPINGOOPHORECTOMY      age 41   • TUBAL LIGATION         Family History   Problem Relation Age of Onset   • Stroke Mother    • Heart disease Father          Medications have been verified          Objective   /90   Pulse 72   Temp 98 3 °F (36 8 °C)   Resp 16   Ht 5' 2" (1 575 m)   Wt 74 8 kg (165 lb)   SpO2 98%   BMI 30 18 kg/m²        Physical Exam     Physical Exam  Vitals and nursing note reviewed  Constitutional:       General: She is not in acute distress  Appearance: She is well-developed  She is not ill-appearing or toxic-appearing  HENT:      Head: Normocephalic and atraumatic  Nose: Mucosal edema present  No congestion  Mouth/Throat:      Pharynx: No oropharyngeal exudate or posterior oropharyngeal erythema  Tonsils: No tonsillar abscesses  Cardiovascular:      Rate and Rhythm: Normal rate and regular rhythm  Heart sounds: Normal heart sounds  No murmur heard  No friction rub  No gallop  Pulmonary:      Effort: Pulmonary effort is normal  No respiratory distress  Breath sounds: No wheezing or rales  Musculoskeletal:      Cervical back: Neck supple  Skin:     General: Skin is warm and dry  Neurological:      Mental Status: She is alert and oriented to person, place, and time  Psychiatric:         Mood and Affect: Mood normal          Behavior: Behavior normal          Thought Content:  Thought content normal          Judgment: Judgment normal

## 2023-02-08 NOTE — PATIENT INSTRUCTIONS
Fatigue   WHAT YOU NEED TO KNOW:   Fatigue is mental and physical exhaustion that does not get better with rest  Fatigue may make daily activities difficult or cause extreme sleepiness  It is normal to feel tired sometimes, but long-term fatigue may be a sign of serious illness  DISCHARGE INSTRUCTIONS:   Return to the emergency department if:   You have chest pain  You have difficulty breathing  Contact your healthcare provider if:   You have a cough that gets worse, or does not go away  You see blood in your urine or bowel movement  You have numbness or tingling around your mouth or in an arm or leg  You faint, feel dizzy, or have vision changes  You have swelling in your lymph nodes  You are a woman and have vaginal bleeding that is not normal for you, or is not expected  You lose weight without trying, or you have trouble eating  You feel weak or have muscle pain  You have pain or swelling in your joints  You have questions or concerns about your condition or care  Follow up with your healthcare provider as directed: You may need more tests  Your healthcare provider may also refer you to a specialist  Write down your questions so you remember to ask them during your visits  Manage fatigue:   Keep a fatigue diary  Include anything that makes you feel more tired or less tired  Bring the diary with you to follow-up visits with your provider  Exercise as directed  Exercise can help you feel more alert  Exercise can also help you manage stress or relieve depression  Try to get at least 30 minutes of exercise most days of the week  Keep a regular sleep schedule  Go to bed and wake up at the same times every day  Limit naps to 1 hour each day  A nap can improve fatigue, but a long nap may make it harder to go to sleep at night  Plan and limit your activities  Limit the number of activities such as shopping and cleaning you do each day   If possible, try to spread out your trips throughout the week  Plan ahead so you are not rushing to get something done  Only do activities that you have the energy to complete  Take breaks between activities  Ask for help if you need it  Another person may be able to drive you or help with daily activities  Eat a variety of healthy foods  Healthy foods include fruits, vegetables, whole-grain breads, low-fat dairy products, beans, lean meats, and fish  Good nutrition can help manage fatigue  Limit caffeine and alcohol  These can make it difficult to fall or stay asleep  Women should limit alcohol to 1 drink a day  Men should limit alcohol to 2 drinks a day  A drink of alcohol is 12 ounces of beer, 5 ounces of wine, or 1½ ounces of liquor  Ask our healthcare provider how much caffeine is safe for you  Do not smoke  Nicotine and other chemicals in cigarettes and cigars can cause lung damage and increase fatigue  Ask your healthcare provider for information if you currently smoke and need help to quit  E-cigarettes or smokeless tobacco still contain nicotine  Talk to your healthcare provider before you use these products  © Copyright [employer] [facility] 2022 Information is for End User's use only and may not be sold, redistributed or otherwise used for commercial purposes  All illustrations and images included in CareNotes® are the copyrighted property of [employer]  or [facility] [place] [employer] Estrella  The above information is an  only  It is not intended as medical advice for individual conditions or treatments  Talk to your doctor, nurse or pharmacist before following any medical regimen to see if it is safe and effective for you

## 2023-02-13 ENCOUNTER — OFFICE VISIT (OUTPATIENT)
Age: 83
End: 2023-02-13

## 2023-02-13 DIAGNOSIS — G89.29 WRIST PAIN, CHRONIC, LEFT: Primary | ICD-10-CM

## 2023-02-13 DIAGNOSIS — M25.532 WRIST PAIN, CHRONIC, LEFT: Primary | ICD-10-CM

## 2023-02-13 DIAGNOSIS — M77.8 LEFT WRIST TENDONITIS: ICD-10-CM

## 2023-02-13 NOTE — PROGRESS NOTES
Daily Note     Today's date: 2023  Patient name: Heaven Bone  : 1940  MRN: [id number]  Referring provider: Tamica Mendez MD  Dx:   Encounter Diagnosis     ICD-10-CM    1  Wrist pain, chronic, left  M25 532     G89 29       2  Left wrist tendonitis  M77 8           Start Time: 1225  Stop Time: 3022  Total time in clinic (min): 40 minutes    Subjective:  Current pain 2/10, range 0-6/10  Notes some improvement      Objective: See treatment diary below      Assessment: Less redness observed, less painful  Continued with AROM, more mobile today than 1 week ago  Initiated  strengthening with putty without increase in pain  Continue PT with goal of resuming all activity with minimal pain  Plan: Progress treatment as tolerated         Precautions: DM, OA, osteoporosis      Manuals            STM/stretch L wrist/hand NV CRR                                                  Neuro Re-Ed             instructed in use of OTC L wrist immobilization orthosis Pt with some difficulty donning and doffing correctly                                                                                          Ther Ex             AROM wrist open hand and fist in gravity eliminated position 10x open hand  10x fist 10x open,10x with fist      10x open/10x with fist against gravity           Tendon glide hook/full 10x  10x 2 x 10    2 x 10           Putty /pinch NV  1 min elbow flx  1 min elbow ext           Wrist isometrics NV -           Thumb CMC etx abd/oppose NV 10x all           Sup/pronation NV 10x open hand  10x with fist                                     Ther Activity                                                                              Modalities  MH 10 min  L wrist

## 2023-02-15 ENCOUNTER — OFFICE VISIT (OUTPATIENT)
Age: 83
End: 2023-02-15

## 2023-02-15 DIAGNOSIS — M25.532 WRIST PAIN, CHRONIC, LEFT: ICD-10-CM

## 2023-02-15 DIAGNOSIS — M77.8 LEFT WRIST TENDONITIS: Primary | ICD-10-CM

## 2023-02-15 DIAGNOSIS — G89.29 WRIST PAIN, CHRONIC, LEFT: ICD-10-CM

## 2023-02-15 NOTE — PROGRESS NOTES
Daily Note     Today's date: 2/15/2023  Patient name: Lissa Gonzalez  : 1940  MRN: [id number]  Referring provider: Alysha Duggan MD  Dx:   Encounter Diagnosis     ICD-10-CM    1  Left wrist tendonitis  M77 8       2  Wrist pain, chronic, left  M25 532     G89 29           Start Time: 1125  Stop Time: 1305  Total time in clinic (min): 100 minutes    Subjective: Pt states 2/10 @ rest, @ worst 8/10  Objective: See treatment diary below      Assessment: Progressing with AROM and  strengthening  Initiated wrist isometrics without increase in pain  Pt states she is using L hand more  Continue PT with goal of increasing activity level with minimal pain  Plan: Progress treatment as tolerated         Precautions: DM, OA, osteoporosis      Manuals 2/8 2/13 2/15          STM/stretch L wrist/hand NV CRR CRR                                                 Neuro Re-Ed             instructed in use of OTC L wrist immobilization orthosis Pt with some difficulty donning and doffing correctly                                                                                          Ther Ex             AROM wrist open hand and fist in gravity eliminated position 10x open hand  10x fist 10x open,10x with fist      10x open/10x with fist against gravity 10x/10x          Tendon glide hook/full 10x  10x 2 x 10    2 x 10 2x 10  2 x 10          Putty /pinch NV  1 min elbow flx  1 min elbow ext 1 min elbow flx  1 min elbow extended          Wrist isometrics NV - 10x all planes (wrist neutral)          Thumb CMC etx abd/oppose NV 10x all 10x all          Sup/pronation NV 10x open hand  10x with fist 10x/10x          Prayer stretch   3 x 10"                       Ther Activity                                                                              Modalities  MH 10 min  L wrist MH 10 min L wrist  Pre-ex

## 2023-02-16 NOTE — PROGRESS NOTES
Daily Note     Today's date: 2023  Patient name: Shera Lundborg  : 1940  MRN: [id number]  Referring provider: Avelino Mckenzie MD  Dx:   Encounter Diagnosis     ICD-10-CM    1  Left wrist tendonitis  M77 8       2  Wrist pain, chronic, left  M25 532     G89 29                      Subjective: ***      Objective: See treatment diary below      Assessment: Mechelle Maldonado tolerated today's treatment session well  Patient education provided on [facility] education options:[id number]}  Orpha Sera completed all TE with good form and no adverse reactions  ***  Orpha Sera continues to benefit from skilled OPPT services to address [id number]}  Will continue to address functional deficits and focus on progression of POC per patient tolerance  Plan: Continue per plan of care  Progress treatment as tolerated         Precautions: DM, OA, osteoporosis      Manuals 2/8 2/13 2/15 2023         STM/stretch L wrist/hand NV CRR CRR                                                 Neuro Re-Ed             instructed in use of OTC L wrist immobilization orthosis Pt with some difficulty donning and doffing correctly                                                                                          Ther Ex             AROM wrist open hand and fist in gravity eliminated position 10x open hand  10x fist 10x open,10x with fist      10x open/10x with fist against gravity 10x/10x          Tendon glide hook/full 10x  10x 2 x 10    2 x 10 2x 10  2 x 10          Putty /pinch NV  1 min elbow flx  1 min elbow ext 1 min elbow flx  1 min elbow extended          Wrist isometrics NV - 10x all planes (wrist neutral)          Thumb CMC etx abd/oppose NV 10x all 10x all          Sup/pronation NV 10x open hand  10x with fist 10x/10x          Prayer stretch   3 x 10"                       Ther Activity                                                                              Modalities  MH 10 min  L wrist MH 10 min L wrist  Pre-ex

## 2023-02-20 ENCOUNTER — APPOINTMENT (OUTPATIENT)
Age: 83
End: 2023-02-20

## 2023-02-20 DIAGNOSIS — M25.532 WRIST PAIN, CHRONIC, LEFT: ICD-10-CM

## 2023-02-20 DIAGNOSIS — G89.29 WRIST PAIN, CHRONIC, LEFT: ICD-10-CM

## 2023-02-20 DIAGNOSIS — M77.8 LEFT WRIST TENDONITIS: Primary | ICD-10-CM

## 2023-03-30 DIAGNOSIS — Z12.31 ENCOUNTER FOR SCREENING MAMMOGRAM FOR MALIGNANT NEOPLASM OF BREAST: Primary | ICD-10-CM

## 2023-04-04 ENCOUNTER — TELEPHONE (OUTPATIENT)
Age: 83
End: 2023-04-04

## 2023-04-04 NOTE — TELEPHONE ENCOUNTER
Patient called to cancel appointment due to wanting to stay with [facility]

## 2023-07-31 ENCOUNTER — OFFICE VISIT (OUTPATIENT)
Dept: FAMILY MEDICINE CLINIC | Facility: CLINIC | Age: 83
End: 2023-07-31
Payer: COMMERCIAL

## 2023-07-31 VITALS
HEIGHT: 62 IN | HEART RATE: 82 BPM | TEMPERATURE: 97.5 F | DIASTOLIC BLOOD PRESSURE: 80 MMHG | OXYGEN SATURATION: 98 % | BODY MASS INDEX: 29.19 KG/M2 | SYSTOLIC BLOOD PRESSURE: 140 MMHG | WEIGHT: 158.6 LBS

## 2023-07-31 DIAGNOSIS — I10 BENIGN ESSENTIAL HYPERTENSION: ICD-10-CM

## 2023-07-31 DIAGNOSIS — Z00.00 MEDICARE ANNUAL WELLNESS VISIT, SUBSEQUENT: Primary | ICD-10-CM

## 2023-07-31 DIAGNOSIS — J45.20 MILD INTERMITTENT ASTHMA WITHOUT COMPLICATION: ICD-10-CM

## 2023-07-31 DIAGNOSIS — M81.6 LOCALIZED OSTEOPOROSIS WITHOUT CURRENT PATHOLOGICAL FRACTURE: ICD-10-CM

## 2023-07-31 DIAGNOSIS — E11.9 TYPE 2 DIABETES MELLITUS WITHOUT COMPLICATION, WITHOUT LONG-TERM CURRENT USE OF INSULIN (HCC): ICD-10-CM

## 2023-07-31 DIAGNOSIS — E03.9 ACQUIRED HYPOTHYROIDISM: ICD-10-CM

## 2023-07-31 DIAGNOSIS — E78.2 MIXED HYPERLIPIDEMIA: ICD-10-CM

## 2023-07-31 LAB — SL AMB POCT HEMOGLOBIN AIC: 6.1 (ref ?–6.5)

## 2023-07-31 PROCEDURE — G0439 PPPS, SUBSEQ VISIT: HCPCS | Performed by: FAMILY MEDICINE

## 2023-07-31 PROCEDURE — 99214 OFFICE O/P EST MOD 30 MIN: CPT | Performed by: FAMILY MEDICINE

## 2023-07-31 PROCEDURE — 83036 HEMOGLOBIN GLYCOSYLATED A1C: CPT | Performed by: FAMILY MEDICINE

## 2023-07-31 RX ORDER — METFORMIN HYDROCHLORIDE 500 MG/1
500 TABLET, EXTENDED RELEASE ORAL DAILY
Qty: 90 TABLET | Refills: 3 | Status: SHIPPED | OUTPATIENT
Start: 2023-07-31

## 2023-07-31 RX ORDER — ALENDRONATE SODIUM 70 MG/1
70 TABLET ORAL
Qty: 13 TABLET | Refills: 3 | Status: SHIPPED | OUTPATIENT
Start: 2023-07-31

## 2023-07-31 RX ORDER — LEVOTHYROXINE SODIUM 88 UG/1
88 TABLET ORAL DAILY
Qty: 90 TABLET | Refills: 3 | Status: SHIPPED | OUTPATIENT
Start: 2023-07-31 | End: 2026-08-30

## 2023-07-31 RX ORDER — AMLODIPINE BESYLATE 10 MG/1
10 TABLET ORAL DAILY
Qty: 90 TABLET | Refills: 3 | Status: SHIPPED | OUTPATIENT
Start: 2023-07-31 | End: 2024-09-05

## 2023-07-31 RX ORDER — SIMVASTATIN 10 MG
10 TABLET ORAL DAILY
Qty: 90 TABLET | Refills: 3 | Status: SHIPPED | OUTPATIENT
Start: 2023-07-31 | End: 2026-10-15

## 2023-07-31 NOTE — PROGRESS NOTES
Chief Complaint   Patient presents with   • Medicare Wellness Visit   • Follow-up        HPI   Here for follow-up of hypertension, diabetes, hyperlipidemia, osteoporosis, and hypothyroidism. And Medicare wellness exam.  Doing well. Recently returned from [place] with children in [place]. 3 grandsons there. No complaints. Taking her usual medication. Past Medical History:   Diagnosis Date   • Acquired hypothyroidism 1/7/2010   • Anxiety state 7/29/2010   • Chronic low back pain 6/3/2015   • Eczema 3/11/2014   • Hearing loss 1/14/2014   • Hyperlipidemia 6/3/2015   • Hypertension    • Iron deficiency anemia 4/6/2010   • Mild persistent asthma without complication 60/43/8240   • Non morbid obesity due to excess calories 9/6/2017   • Osteoarthritis 11/9/2015   • Osteoporosis    • Rectocele     grade 2   • Skin cancer     treating with dermatologist   • Type 2 diabetes mellitus without complication, without long-term current use of insulin ([address]) 12/02/2018   • Vitamin D deficiency 7/17/2013        Past Surgical History:   Procedure Laterality Date   • APPENDECTOMY     • CATARACT EXTRACTION Bilateral     and lens implants   • CHOLECYSTECTOMY     • HYSTERECTOMY      age 41   • KNEE SURGERY Bilateral     Revision of left knee 2006   • Tia Hector     Dr. Ade Sheldon   • MYRINGOTOMY W/ TUBES Right 02/08/2021    tube insertion by Dr. Sara Carter   • TONSILLECTOMY     • TOTAL ABDOMINAL HYSTERECTOMY W/ BILATERAL SALPINGOOPHORECTOMY      age 41   • TUBAL LIGATION         Social History     Tobacco Use   • Smoking status: Never   • Smokeless tobacco: Never   Substance Use Topics   • Alcohol use: Never       Social History     Social History Narrative     since 1968. 2 children. Daughter in [place]. Son is a manager at [employer]. .    3 grandsons.  was a  at [employer]. Enjoys reading, scrap booking, swimming. Swims on a regular basis during the summer for 30 minutes. The following portions of the patient's history were reviewed and updated as appropriate: allergies, current medications, past family history, past medical history, past social history, past surgical history and problem list.      Review of Systems       /80 (BP Location: Left arm, Patient Position: Sitting, Cuff Size: Adult)   Pulse 82   Temp 97.5 °F (36.4 °C) (Temporal)   Ht 5' 2" (1.575 m)   Wt 71.9 kg (158 lb 9.6 oz)   SpO2 98%   BMI 29.01 kg/m²      Physical Exam   Appears well. Lungs are clear. Heart regular with no murmur. Abdomen soft and nontender. No edema. A1c 6.1. Mood is upbeat. Affect appropriate.               Current Outpatient Medications:   •  acetaminophen (TYLENOL) 650 mg CR tablet, , Disp: , Rfl:   •  albuterol (PROVENTIL HFA,VENTOLIN HFA) 90 mcg/act inhaler, Inhale 2 puffs every 4 (four) hours, Disp: 54 g, Rfl: 3  •  alendronate (Fosamax) 70 mg tablet, Take 1 tablet (70 mg total) by mouth every 7 days, Disp: 13 tablet, Rfl: 3  •  amLODIPine (NORVASC) 10 mg tablet, Take 1 tablet (10 mg total) by mouth daily, Disp: 90 tablet, Rfl: 3  •  beclomethasone (QVAR REDIHALER) 40 MCG/ACT inhaler, Inhale 2 puffs daily Rinse mouth after use., Disp: 10.6 g, Rfl: 3  •  BLACK COHOSH PO, 1 tab twice daily, Disp: , Rfl:   •  Calcium Carbonate-Vitamin D (CALCIUM 500/D PO), Take 2 tablets by mouth, Disp: , Rfl:   •  Cholecalciferol 125 MCG (5000 UT) capsule, Take 5,000 Units by mouth daily , Disp: , Rfl:   •  clobetasol (TEMOVATE) 0.05 % cream, Apply topically Taken as needed, Disp: , Rfl:   •  clotrimazole-betamethasone (LOTRISONE) 1-0.05 % cream, Apply topically 2 (two) times a day (Patient taking differently: Apply topically 2 (two) times a day Taken as needed), Disp: 30 g, Rfl: 0  •  ferrous sulfate 325 (65 Fe) mg tablet, One tablet Monday Wednesday Friday, Disp: , Rfl:   •  fluticasone (FLONASE) 50 mcg/act nasal spray, 2 sprays into each nostril, Disp: , Rfl:   •  levothyroxine 88 mcg tablet, Take 1 tablet (88 mcg total) by mouth daily, Disp: 90 tablet, Rfl: 3  •  metFORMIN (GLUCOPHAGE-XR) 500 mg 24 hr tablet, Take 1 tablet (500 mg total) by mouth daily, Disp: 90 tablet, Rfl: 3  •  multivitamin (THERAGRAN) TABS, Take 1 tablet by mouth, Disp: , Rfl:   •  mupirocin (BACTROBAN) 2 % ointment, , Disp: , Rfl:   •  Omega-3 Fatty Acids (FISH OIL) 1200 MG CAPS, Take by mouth, Disp: , Rfl:   •  simvastatin (ZOCOR) 10 mg tablet, Take 1 tablet (10 mg total) by mouth daily, Disp: 90 tablet, Rfl: 3  •  triamcinolone (KENALOG) 0.1 % ointment, , Disp: , Rfl:      No problem-specific Assessment & Plan notes found for this encounter. Diagnoses and all orders for this visit:    Medicare annual wellness visit, subsequent    Type 2 diabetes mellitus without complication, without long-term current use of insulin ([facility])  -     POCT hemoglobin A1c  -     metFORMIN (GLUCOPHAGE-XR) 500 mg 24 hr tablet; Take 1 tablet (500 mg total) by mouth daily  -     Albumin / creatinine urine ratio    Acquired hypothyroidism  -     levothyroxine 88 mcg tablet; Take 1 tablet (88 mcg total) by mouth daily  -     TSH, 3rd generation with Free T4 reflex; Future    Benign essential hypertension  -     amLODIPine (NORVASC) 10 mg tablet; Take 1 tablet (10 mg total) by mouth daily  -     Comprehensive metabolic panel; Future    Localized osteoporosis without current pathological fracture  -     alendronate (Fosamax) 70 mg tablet; Take 1 tablet (70 mg total) by mouth every 7 days    Mixed hyperlipidemia  -     simvastatin (ZOCOR) 10 mg tablet; Take 1 tablet (10 mg total) by mouth daily  -     Lipid panel; Future    Mild intermittent asthma without complication        Patient Instructions     Medicare wellness exam is completed. Diabetes well controlled with A1c of 6.1. Blood pressure well controlled. Prescription given for blood work in about 3 months. Medications otherwise remain the same. Recheck in 6 months.   Medicare Preventive Visit Patient Instructions  Thank you for completing your Welcome to Medicare Visit or Medicare Annual Wellness Visit today. Your next wellness visit will be due in one year (7/31/2024). The screening/preventive services that you may require over the next 5-10 years are detailed below. Some tests may not apply to you based off risk factors and/or age. Screening tests ordered at today's visit but not completed yet may show as past due. Also, please note that scanned in results may not display below. Preventive Screenings:  Service Recommendations Previous Testing/Comments   Colorectal Cancer Screening  * Colonoscopy    * Fecal Occult Blood Test (FOBT)/Fecal Immunochemical Test (FIT)  * Fecal DNA/Cologuard Test  * Flexible Sigmoidoscopy Age: 43-73 years old   Colonoscopy: every 10 years (may be performed more frequently if at higher risk)  OR  FOBT/FIT: every 1 year  OR  Cologuard: every 3 years  OR  Sigmoidoscopy: every 5 years  Screening may be recommended earlier than age 39 if at higher risk for colorectal cancer. Also, an individualized decision between you and your healthcare provider will decide whether screening between the ages of 77-80 would be appropriate. Colonoscopy: Not on file  FOBT/FIT: Not on file  Cologuard: Not on file  Sigmoidoscopy: Not on file          Breast Cancer Screening Age: 36 years old  Frequency: every 1-2 years  Not required if history of left and right mastectomy Mammogram: 04/10/2023    Screening Current   Cervical Cancer Screening Between the ages of 21-29, pap smear recommended once every 3 years. Between the ages of 32-69, can perform pap smear with HPV co-testing every 5 years.    Recommendations may differ for women with a history of total hysterectomy, cervical cancer, or abnormal pap smears in past. Pap Smear: 09/08/2020    Screening Not Indicated   Hepatitis C Screening Once for adults born between 1945 and 1965  More frequently in patients at high risk for Hepatitis C Hep C Antibody: Not on file        Diabetes Screening 1-2 times per year if you're at risk for diabetes or have pre-diabetes Fasting glucose: 110 mg/dL (11/14/2022)  A1C: 6.3 % (11/14/2022)  Screening Not Indicated  History Diabetes   Cholesterol Screening Once every 5 years if you don't have a lipid disorder. May order more often based on risk factors. Lipid panel: 11/14/2022    Screening Not Indicated  History Lipid Disorder     Other Preventive Screenings Covered by Medicare:  1. Abdominal Aortic Aneurysm (AAA) Screening: covered once if your at risk. You're considered to be at risk if you have a family history of AAA. 2. Lung Cancer Screening: covers low dose CT scan once per year if you meet all of the following conditions: (1) Age 48-67; (2) No signs or symptoms of lung cancer; (3) Current smoker or have quit smoking within the last 15 years; (4) You have a tobacco smoking history of at least 20 pack years (packs per day multiplied by number of years you smoked); (5) You get a written order from a healthcare provider. 3. Glaucoma Screening: covered annually if you're considered high risk: (1) You have diabetes OR (2) Family history of glaucoma OR (3)  aged 48 and older OR (3)  American aged 72 and older  3. Osteoporosis Screening: covered every 2 years if you meet one of the following conditions: (1) You're estrogen deficient and at risk for osteoporosis based off medical history and other findings; (2) Have a vertebral abnormality; (3) On glucocorticoid therapy for more than 3 months; (4) Have primary hyperparathyroidism; (5) On osteoporosis medications and need to assess response to drug therapy. · Last bone density test (DXA Scan): 10/20/2020.  5. HIV Screening: covered annually if you're between the age of 15-65. Also covered annually if you are younger than 13 and older than 72 with risk factors for HIV infection.  For pregnant patients, it is covered up to 3 times per pregnancy. Immunizations:  Immunization Recommendations   Influenza Vaccine Annual influenza vaccination during flu season is recommended for all persons aged >= 6 months who do not have contraindications   Pneumococcal Vaccine   * Pneumococcal conjugate vaccine = PCV13 (Prevnar 13), PCV15 (Vaxneuvance), PCV20 (Prevnar 20)  * Pneumococcal polysaccharide vaccine = PPSV23 (Pneumovax) Adults 20-63 years old: 1-3 doses may be recommended based on certain risk factors  Adults 72 years old: 1-2 doses may be recommended based off what pneumonia vaccine you previously received   Hepatitis B Vaccine 3 dose series if at intermediate or high risk (ex: diabetes, end stage renal disease, liver disease)   Tetanus (Td) Vaccine - COST NOT COVERED BY MEDICARE PART B Following completion of primary series, a booster dose should be given every 10 years to maintain immunity against tetanus. Td may also be given as tetanus wound prophylaxis. Tdap Vaccine - COST NOT COVERED BY MEDICARE PART B Recommended at least once for all adults. For pregnant patients, recommended with each pregnancy. Shingles Vaccine (Shingrix) - COST NOT COVERED BY MEDICARE PART B  2 shot series recommended in those aged 48 and above     Health Maintenance Due:      Topic Date Due   • Breast Cancer Screening: Mammogram  04/10/2024   • DXA SCAN  10/20/2025     Immunizations Due:      Topic Date Due   • COVID-19 Vaccine (6 - Moderna series) 01/09/2023   • Influenza Vaccine (1) 09/01/2023     Advance Directives   What are advance directives? Advance directives are legal documents that state your wishes and plans for medical care. These plans are made ahead of time in case you lose your ability to make decisions for yourself. Advance directives can apply to any medical decision, such as the treatments you want, and if you want to donate organs. What are the types of advance directives?   There are many types of advance directives, and each state has rules about how to use them. You may choose a combination of any of the following:  · Living will: This is a written record of the treatment you want. You can also choose which treatments you do not want, which to limit, and which to stop at a certain time. This includes surgery, medicine, IV fluid, and tube feedings. · Durable power of  for [facility]): This is a written record that states who you want to make healthcare choices for you when you are unable to make them for yourself. This person, called a proxy, is usually a family member or a friend. You may choose more than 1 proxy. · Do not resuscitate (DNR) order:  A DNR order is used in case your heart stops beating or you stop breathing. It is a request not to have certain forms of treatment, such as CPR. A DNR order may be included in other types of advance directives. · Medical directive: This covers the care that you want if you are in a coma, near death, or unable to make decisions for yourself. You can list the treatments you want for each condition. Treatment may include pain medicine, surgery, blood transfusions, dialysis, IV or tube feedings, and a ventilator (breathing machine). · Values history: This document has questions about your views, beliefs, and how you feel and think about life. This information can help others choose the care that you would choose. Why are advance directives important? An advance directive helps you control your care. Although spoken wishes may be used, it is better to have your wishes written down. Spoken wishes can be misunderstood, or not followed. Treatments may be given even if you do not want them. An advance directive may make it easier for your family to make difficult choices about your care.    Weight Management   Why it is important to manage your weight:  Being overweight increases your risk of health conditions such as heart disease, high blood pressure, type 2 diabetes, and certain types of cancer. It can also increase your risk for osteoarthritis, sleep apnea, and other respiratory problems. Aim for a slow, steady weight loss. Even a small amount of weight loss can lower your risk of health problems. How to lose weight safely:  A safe and healthy way to lose weight is to eat fewer calories and get regular exercise. You can lose up about 1 pound a week by decreasing the number of calories you eat by 500 calories each day. Healthy meal plan for weight management:  A healthy meal plan includes a variety of foods, contains fewer calories, and helps you stay healthy. A healthy meal plan includes the following:  · Eat whole-grain foods more often. A healthy meal plan should contain fiber. Fiber is the part of grains, fruits, and vegetables that is not broken down by your body. Whole-grain foods are healthy and provide extra fiber in your diet. Some examples of whole-grain foods are whole-wheat breads and pastas, oatmeal, brown rice, and bulgur. · Eat a variety of vegetables every day. Include dark, leafy greens such as spinach, kale, rae greens, and mustard greens. Eat yellow and orange vegetables such as carrots, sweet potatoes, and winter squash. · Eat a variety of fruits every day. Choose fresh or canned fruit (canned in its own juice or light syrup) instead of juice. Fruit juice has very little or no fiber. · Eat low-fat dairy foods. Drink fat-free (skim) milk or 1% milk. Eat fat-free yogurt and low-fat cottage cheese. Try low-fat cheeses such as mozzarella and other reduced-fat cheeses. · Choose meat and other protein foods that are low in fat. Choose beans or other legumes such as split peas or lentils. Choose fish, skinless poultry (chicken or turkey), or lean cuts of red meat (beef or pork). Before you cook meat or poultry, cut off any visible fat. · Use less fat and oil. Try baking foods instead of frying them.  Add less fat, such as margarine, sour cream, regular salad dressing and mayonnaise to foods. Eat fewer high-fat foods. Some examples of high-fat foods include french fries, doughnuts, ice cream, and cakes. · Eat fewer sweets. Limit foods and drinks that are high in sugar. This includes candy, cookies, regular soda, and sweetened drinks. Exercise:  Exercise at least 30 minutes per day on most days of the week. Some examples of exercise include walking, biking, dancing, and swimming. You can also fit in more physical activity by taking the stairs instead of the elevator or parking farther away from stores. Ask your healthcare provider about the best exercise plan for you. © Copyright [employer] 2018 Information is for End User's use only and may not be sold, redistributed or otherwise used for commercial purposes.  All illustrations and images included in CareNotes® are the copyrighted property of [employer] or [address] [place] [address]

## 2023-07-31 NOTE — PROGRESS NOTES
Assessment and Plan:     Problem List Items Addressed This Visit    None  Visit Diagnoses     Medicare annual wellness visit, subsequent    -  Primary           Preventive health issues were discussed with patient, and age appropriate screening tests were ordered as noted in patient's After Visit Summary. Personalized health advice and appropriate referrals for health education or preventive services given if needed, as noted in patient's After Visit Summary.      History of Present Illness:     Patient presents for a Medicare Wellness Visit    HPI   Patient Care Team:  Mohinder Greco MD as PCP - General (Family Medicine)  Zoey Leach MD     Review of Systems:     Review of Systems     Problem List:     Patient Active Problem List   Diagnosis   • Rectocele   • Acquired hypothyroidism   • Anxiety state   • Benign essential hypertension   • Chronic low back pain   • Eczema   • Hearing loss   • History of knee replacement   • History of malignant neoplasm of skin   • Hyperlipidemia   • Mild intermittent asthma without complication   • Non morbid obesity due to excess calories   • Osteoarthritis   • Type 2 diabetes mellitus without complication, without long-term current use of insulin ([address])   • Vitamin D deficiency   • Localized osteoporosis without current pathological fracture      Past Medical and Surgical History:     Past Medical History:   Diagnosis Date   • Acquired hypothyroidism 1/7/2010   • Anxiety state 7/29/2010   • Chronic low back pain 6/3/2015   • Eczema 3/11/2014   • Hearing loss 1/14/2014   • Hyperlipidemia 6/3/2015   • Hypertension    • Iron deficiency anemia 4/6/2010   • Mild persistent asthma without complication 32/56/8992   • Non morbid obesity due to excess calories 9/6/2017   • Osteoarthritis 11/9/2015   • Osteoporosis    • Rectocele     grade 2   • Skin cancer     treating with dermatologist   • Type 2 diabetes mellitus without complication, without long-term current use of insulin ([address]) 12/02/2018   • Vitamin D deficiency 7/17/2013     Past Surgical History:   Procedure Laterality Date   • APPENDECTOMY     • CATARACT EXTRACTION Bilateral     and lens implants   • CHOLECYSTECTOMY     • HYSTERECTOMY      age 41   • KNEE SURGERY Bilateral     Revision of left knee 2006   • [facility]     Dr. Ade Sheldon   • MYRINGOTOMY W/ TUBES Right 02/08/2021    tube insertion by Dr. Sara Carter   • TONSILLECTOMY     • TOTAL ABDOMINAL HYSTERECTOMY W/ BILATERAL SALPINGOOPHORECTOMY      age 41   • TUBAL LIGATION        Family History:     Family History   Problem Relation Age of Onset   • Stroke Mother    • Heart disease Father    • No Known Problems Sister    • No Known Problems Daughter    • No Known Problems Maternal Grandmother    • No Known Problems Maternal Grandfather    • No Known Problems Paternal Grandmother    • No Known Problems Paternal Grandfather    • No Known Problems Maternal Aunt    • No Known Problems Maternal Aunt    • No Known Problems Paternal Aunt    • No Known Problems Paternal Aunt    • Breast cancer Neg Hx       Social History:     Social History     Socioeconomic History   • Marital status: /Civil Union     Spouse name: Not on file   • Number of children: Not on file   • Years of education: Not on file   • Highest education level: Not on file   Occupational History   • Not on file   Tobacco Use   • Smoking status: Never   • Smokeless tobacco: Never   Vaping Use   • Vaping Use: Never used   Substance and Sexual Activity   • Alcohol use: Never   • Drug use: No   • Sexual activity: Yes     Partners: Male   Other Topics Concern   • Not on file   Social History Narrative     since 1968. 2 children. Daughter in [facility]. Son is a manager at [employer]. .    3 grandsons.  was a  at [employer]. Enjoys reading, scrap booking, swimming. Swims on a regular basis during the summer for 30 minutes.      Social Determinants of Health     Financial Resource Strain: Not on file   Food Insecurity: Not on file   Transportation Needs: Not on file   Physical Activity: Not on file   Stress: Not on file   Social Connections: Not on file   Intimate Partner Violence: Not on file   Housing Stability: Not on file      Medications and Allergies:     Current Outpatient Medications   Medication Sig Dispense Refill   • acetaminophen (TYLENOL) 650 mg CR tablet      • albuterol (PROVENTIL HFA,VENTOLIN HFA) 90 mcg/act inhaler Inhale 2 puffs every 4 (four) hours 54 g 3   • alendronate (Fosamax) 70 mg tablet Take 1 tablet (70 mg total) by mouth every 7 days 13 tablet 3   • amLODIPine (NORVASC) 10 mg tablet Take 1 tablet (10 mg total) by mouth daily 90 tablet 3   • beclomethasone (QVAR REDIHALER) 40 MCG/ACT inhaler Inhale 2 puffs daily Rinse mouth after use.  10.6 g 3   • BLACK COHOSH PO 1 tab twice daily     • Calcium Carbonate-Vitamin D (CALCIUM 500/D PO) Take 2 tablets by mouth     • Cholecalciferol 125 MCG (5000 UT) capsule Take 5,000 Units by mouth daily      • clobetasol (TEMOVATE) 0.05 % cream Apply topically Taken as needed     • clotrimazole-betamethasone (LOTRISONE) 1-0.05 % cream Apply topically 2 (two) times a day (Patient taking differently: Apply topically 2 (two) times a day Taken as needed) 30 g 0   • ferrous sulfate 325 (65 Fe) mg tablet One tablet Monday Wednesday Friday     • fluticasone (FLONASE) 50 mcg/act nasal spray 2 sprays into each nostril     • levothyroxine 88 mcg tablet Take 1 tablet (88 mcg total) by mouth daily 90 tablet 3   • metFORMIN (GLUCOPHAGE-XR) 500 mg 24 hr tablet Take 1 tablet (500 mg total) by mouth daily 90 tablet 3   • multivitamin (THERAGRAN) TABS Take 1 tablet by mouth     • mupirocin (BACTROBAN) 2 % ointment      • Omega-3 Fatty Acids (FISH OIL) 1200 MG CAPS Take by mouth     • simvastatin (ZOCOR) 10 mg tablet Take 1 tablet (10 mg total) by mouth daily 90 tablet 3   • triamcinolone (KENALOG) 0.1 % ointment        No current facility-administered medications for this visit. Allergies   Allergen Reactions   • Alendronate Anaphylaxis   • Latex Anaphylaxis   • Ace Inhibitors Other (See Comments)     LISINOPRIL -cough and shortness of breath   • Atenolol Other (See Comments)   • Beta Adrenergic Blockers Other (See Comments)     SOB,WHEEZING   • Lisinopril    • Prednisolone Other (See Comments)     PRED FORTE(swollen eyes/cheek-per nurse   • Sulfa Antibiotics Other (See Comments)      Reaction unknown   • Tobramycin    • Trimethoprim      Other reaction(s): Rash   • Bacitracin Rash   • Nifedipine Palpitations      Immunizations:     Immunization History   Administered Date(s) Administered   • COVID-19 MODERNA VACC 0.5 ML IM 03/02/2021, 03/30/2021, 10/15/2021, 04/08/2022   • COVID-19 Pfizer Vac BIVALENT Joe-sucrose 12 Yr+ IM (BOOSTER ONLY) 09/09/2022   • INFLUENZA 10/28/2002, 11/16/2006, 11/01/2007, 11/03/2008, 09/29/2009, 10/14/2010, 10/20/2011, 11/15/2012, 10/18/2013, 10/14/2014, 09/09/2015, 11/07/2016, 09/06/2017, 10/18/2018, 09/25/2019, 09/08/2020, 09/09/2021, 09/09/2022   • Influenza, high dose seasonal 0.7 mL 11/07/2016   • Pneumococcal Conjugate 13-Valent 11/06/2015   • Pneumococcal Polysaccharide PPV23 05/17/2012   • Td (adult), Unspecified 05/16/2003   • Tdap 09/20/2016   • influenza, trivalent, adjuvanted 09/08/2020      Health Maintenance:         Topic Date Due   • Breast Cancer Screening: Mammogram  04/10/2024   • DXA SCAN  10/20/2025         Topic Date Due   • COVID-19 Vaccine (6 - Moderna series) 01/09/2023   • Influenza Vaccine (1) 09/01/2023      Medicare Screening Tests and Risk Assessments:     Sumit Holt is here for her Subsequent Wellness visit. Health Risk Assessment:   Patient rates overall health as good. Patient feels that their physical health rating is same. Patient is satisfied with their life. Eyesight was rated as same. Hearing was rated as same. Patient feels that their emotional and mental health rating is same.  Patients states they are sometimes angry. Patient states they are sometimes unusually tired/fatigued. Pain experienced in the last 7 days has been some. Patient's pain rating has been 5/10. Patient states that she has experienced no weight loss or gain in last 6 months. Fall Risk Screening: In the past year, patient has experienced: no history of falling in past year      Urinary Incontinence Screening:   Patient has not leaked urine accidently in the last six months. Home Safety:  Patient has trouble with stairs inside or outside of their home. Patient has working smoke alarms and has working carbon monoxide detector. Home safety hazards include: none. Nutrition:   Current diet is Regular. Medications:   Patient is currently taking over-the-counter supplements. OTC medications include: see medication list. Patient is able to manage medications. Activities of Daily Living (ADLs)/Instrumental Activities of Daily Living (IADLs):   Walk and transfer into and out of bed and chair?: Yes  Dress and groom yourself?: Yes    Bathe or shower yourself?: Yes    Feed yourself?  Yes  Do your laundry/housekeeping?: Yes  Manage your money, pay your bills and track your expenses?: Yes  Make your own meals?: Yes    Do your own shopping?: Yes    Previous Hospitalizations:   Any hospitalizations or ED visits within the last 12 months?: No      Advance Care Planning:   Living will: No    Five wishes given: Yes      PREVENTIVE SCREENINGS      Cardiovascular Screening:    General: Screening Not Indicated and History Lipid Disorder      Diabetes Screening:     General: Screening Not Indicated and History Diabetes      Breast Cancer Screening:     General: Screening Current      Cervical Cancer Screening:    General: Screening Not Indicated      Osteoporosis Screening:    General: Screening Not Indicated and History Osteoporosis      Lung Cancer Screening:     General: Screening Not Indicated    Screening, Brief Intervention, and Referral to Treatment (SBIRT)    Screening    Typical number of drinks in a week: 0    Single Item Drug Screening:  How often have you used an illegal drug (including marijuana) or a prescription medication for non-medical reasons in the past year? never    Single Item Drug Screen Score: 0  Interpretation: Negative screen for possible drug use disorder    No results found.      Physical Exam:     /80 (BP Location: Left arm, Patient Position: Sitting, Cuff Size: Adult)   Pulse 82   Temp 97.5 °F (36.4 °C) (Temporal)   Ht 5' 2" (1.575 m)   Wt 71.9 kg (158 lb 9.6 oz)   SpO2 98%   BMI 29.01 kg/m²     Physical Exam     Lyn Munoz MD

## 2023-07-31 NOTE — PATIENT INSTRUCTIONS
Medicare wellness exam is completed. Diabetes well controlled with A1c of 6.1. Blood pressure well controlled. Prescription given for blood work in about 3 months. Medications otherwise remain the same. Recheck in 6 months. Medicare Preventive Visit Patient Instructions  Thank you for completing your Welcome to Medicare Visit or Medicare Annual Wellness Visit today. Your next wellness visit will be due in one year (7/31/2024). The screening/preventive services that you may require over the next 5-10 years are detailed below. Some tests may not apply to you based off risk factors and/or age. Screening tests ordered at today's visit but not completed yet may show as past due. Also, please note that scanned in results may not display below. Preventive Screenings:  Service Recommendations Previous Testing/Comments   Colorectal Cancer Screening  * Colonoscopy    * Fecal Occult Blood Test (FOBT)/Fecal Immunochemical Test (FIT)  * Fecal DNA/Cologuard Test  * Flexible Sigmoidoscopy Age: 43-73 years old   Colonoscopy: every 10 years (may be performed more frequently if at higher risk)  OR  FOBT/FIT: every 1 year  OR  Cologuard: every 3 years  OR  Sigmoidoscopy: every 5 years  Screening may be recommended earlier than age 39 if at higher risk for colorectal cancer. Also, an individualized decision between you and your healthcare provider will decide whether screening between the ages of 77-80 would be appropriate. Colonoscopy: Not on file  FOBT/FIT: Not on file  Cologuard: Not on file  Sigmoidoscopy: Not on file          Breast Cancer Screening Age: 36 years old  Frequency: every 1-2 years  Not required if history of left and right mastectomy Mammogram: 04/10/2023    Screening Current   Cervical Cancer Screening Between the ages of 21-29, pap smear recommended once every 3 years. Between the ages of 32-69, can perform pap smear with HPV co-testing every 5 years.    Recommendations may differ for women with a history of total hysterectomy, cervical cancer, or abnormal pap smears in past. Pap Smear: 09/08/2020    Screening Not Indicated   Hepatitis C Screening Once for adults born between 1945 and 1965  More frequently in patients at high risk for Hepatitis C Hep C Antibody: Not on file        Diabetes Screening 1-2 times per year if you're at risk for diabetes or have pre-diabetes Fasting glucose: 110 mg/dL (11/14/2022)  A1C: 6.3 % (11/14/2022)  Screening Not Indicated  History Diabetes   Cholesterol Screening Once every 5 years if you don't have a lipid disorder. May order more often based on risk factors. Lipid panel: 11/14/2022    Screening Not Indicated  History Lipid Disorder     Other Preventive Screenings Covered by Medicare:  Abdominal Aortic Aneurysm (AAA) Screening: covered once if your at risk. You're considered to be at risk if you have a family history of AAA. Lung Cancer Screening: covers low dose CT scan once per year if you meet all of the following conditions: (1) Age 48-67; (2) No signs or symptoms of lung cancer; (3) Current smoker or have quit smoking within the last 15 years; (4) You have a tobacco smoking history of at least 20 pack years (packs per day multiplied by number of years you smoked); (5) You get a written order from a healthcare provider. Glaucoma Screening: covered annually if you're considered high risk: (1) You have diabetes OR (2) Family history of glaucoma OR (3)  aged 48 and older OR (3)  American aged 72 and older  Osteoporosis Screening: covered every 2 years if you meet one of the following conditions: (1) You're estrogen deficient and at risk for osteoporosis based off medical history and other findings; (2) Have a vertebral abnormality; (3) On glucocorticoid therapy for more than 3 months; (4) Have primary hyperparathyroidism; (5) On osteoporosis medications and need to assess response to drug therapy. Last bone density test (DXA Scan): 10/20/2020.   HIV Screening: covered annually if you're between the age of 15-65. Also covered annually if you are younger than 13 and older than 72 with risk factors for HIV infection. For pregnant patients, it is covered up to 3 times per pregnancy. Immunizations:  Immunization Recommendations   Influenza Vaccine Annual influenza vaccination during flu season is recommended for all persons aged >= 6 months who do not have contraindications   Pneumococcal Vaccine   * Pneumococcal conjugate vaccine = PCV13 (Prevnar 13), PCV15 (Vaxneuvance), PCV20 (Prevnar 20)  * Pneumococcal polysaccharide vaccine = PPSV23 (Pneumovax) Adults 20-63 years old: 1-3 doses may be recommended based on certain risk factors  Adults 72 years old: 1-2 doses may be recommended based off what pneumonia vaccine you previously received   Hepatitis B Vaccine 3 dose series if at intermediate or high risk (ex: diabetes, end stage renal disease, liver disease)   Tetanus (Td) Vaccine - COST NOT COVERED BY MEDICARE PART B Following completion of primary series, a booster dose should be given every 10 years to maintain immunity against tetanus. Td may also be given as tetanus wound prophylaxis. Tdap Vaccine - COST NOT COVERED BY MEDICARE PART B Recommended at least once for all adults. For pregnant patients, recommended with each pregnancy. Shingles Vaccine (Shingrix) - COST NOT COVERED BY MEDICARE PART B  2 shot series recommended in those aged 48 and above     Health Maintenance Due:      Topic Date Due    Breast Cancer Screening: Mammogram  04/10/2024    DXA SCAN  10/20/2025     Immunizations Due:      Topic Date Due    COVID-19 Vaccine (6 - Moderna series) 01/09/2023    Influenza Vaccine (1) 09/01/2023     Advance Directives   What are advance directives? Advance directives are legal documents that state your wishes and plans for medical care. These plans are made ahead of time in case you lose your ability to make decisions for yourself.  Advance directives can apply to any medical decision, such as the treatments you want, and if you want to donate organs. What are the types of advance directives? There are many types of advance directives, and each state has rules about how to use them. You may choose a combination of any of the following:  Living will: This is a written record of the treatment you want. You can also choose which treatments you do not want, which to limit, and which to stop at a certain time. This includes surgery, medicine, IV fluid, and tube feedings. Durable power of  for [facility]): This is a written record that states who you want to make healthcare choices for you when you are unable to make them for yourself. This person, called a proxy, is usually a family member or a friend. You may choose more than 1 proxy. Do not resuscitate (DNR) order:  A DNR order is used in case your heart stops beating or you stop breathing. It is a request not to have certain forms of treatment, such as CPR. A DNR order may be included in other types of advance directives. Medical directive: This covers the care that you want if you are in a coma, near death, or unable to make decisions for yourself. You can list the treatments you want for each condition. Treatment may include pain medicine, surgery, blood transfusions, dialysis, IV or tube feedings, and a ventilator (breathing machine). Values history: This document has questions about your views, beliefs, and how you feel and think about life. This information can help others choose the care that you would choose. Why are advance directives important? An advance directive helps you control your care. Although spoken wishes may be used, it is better to have your wishes written down. Spoken wishes can be misunderstood, or not followed. Treatments may be given even if you do not want them. An advance directive may make it easier for your family to make difficult choices about your care. Weight Management   Why it is important to manage your weight:  Being overweight increases your risk of health conditions such as heart disease, high blood pressure, type 2 diabetes, and certain types of cancer. It can also increase your risk for osteoarthritis, sleep apnea, and other respiratory problems. Aim for a slow, steady weight loss. Even a small amount of weight loss can lower your risk of health problems. How to lose weight safely:  A safe and healthy way to lose weight is to eat fewer calories and get regular exercise. You can lose up about 1 pound a week by decreasing the number of calories you eat by 500 calories each day. Healthy meal plan for weight management:  A healthy meal plan includes a variety of foods, contains fewer calories, and helps you stay healthy. A healthy meal plan includes the following:  Eat whole-grain foods more often. A healthy meal plan should contain fiber. Fiber is the part of grains, fruits, and vegetables that is not broken down by your body. Whole-grain foods are healthy and provide extra fiber in your diet. Some examples of whole-grain foods are whole-wheat breads and pastas, oatmeal, brown rice, and bulgur. Eat a variety of vegetables every day. Include dark, leafy greens such as spinach, kale, rae greens, and mustard greens. Eat yellow and orange vegetables such as carrots, sweet potatoes, and winter squash. Eat a variety of fruits every day. Choose fresh or canned fruit (canned in its own juice or light syrup) instead of juice. Fruit juice has very little or no fiber. Eat low-fat dairy foods. Drink fat-free (skim) milk or 1% milk. Eat fat-free yogurt and low-fat cottage cheese. Try low-fat cheeses such as mozzarella and other reduced-fat cheeses. Choose meat and other protein foods that are low in fat. Choose beans or other legumes such as split peas or lentils.  Choose fish, skinless poultry (chicken or turkey), or lean cuts of red meat (beef or pork). Before you cook meat or poultry, cut off any visible fat. Use less fat and oil. Try baking foods instead of frying them. Add less fat, such as margarine, sour cream, regular salad dressing and mayonnaise to foods. Eat fewer high-fat foods. Some examples of high-fat foods include french fries, doughnuts, ice cream, and cakes. Eat fewer sweets. Limit foods and drinks that are high in sugar. This includes candy, cookies, regular soda, and sweetened drinks. Exercise:  Exercise at least 30 minutes per day on most days of the week. Some examples of exercise include walking, biking, dancing, and swimming. You can also fit in more physical activity by taking the stairs instead of the elevator or parking farther away from stores. Ask your healthcare provider about the best exercise plan for you. © Copyright [employer] 2018 Information is for End User's use only and may not be sold, redistributed or otherwise used for commercial purposes.  All illustrations and images included in CareNotes® are the copyrighted property of [employer].A.M., Inc. or  [address]

## 2023-09-21 ENCOUNTER — OFFICE VISIT (OUTPATIENT)
Dept: URGENT CARE | Facility: MEDICAL CENTER | Age: 83
End: 2023-09-21
Payer: COMMERCIAL

## 2023-09-21 VITALS
TEMPERATURE: 98.6 F | HEART RATE: 91 BPM | OXYGEN SATURATION: 96 % | DIASTOLIC BLOOD PRESSURE: 80 MMHG | RESPIRATION RATE: 18 BRPM | SYSTOLIC BLOOD PRESSURE: 134 MMHG
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
## 2023-09-21 PROCEDURE — 90715 TDAP VACCINE 7 YRS/> IM: CPT

## 2023-09-21 PROCEDURE — 99213 OFFICE O/P EST LOW 20 MIN: CPT

## 2023-09-21 NOTE — PATIENT INSTRUCTIONS
Your Tetanus was updated today. For the abrasions, apply triple antibiotic ointment to the abrasions you have. Your  will be watching you to make sure you are ok. He will be checking for symptoms such as headache, dizziness, lightheadedness, confusion, vomiting- if any of these happen - Proceed to ER.

## 2023-09-21 NOTE — PROGRESS NOTES
[facility]        NAME: Jaci Luis is a 82 y.o. female  : 1940    MRN: [id number]  DATE: 2023  TIME: 3:56 PM    Assessment and Plan   Fall, initial encounter [W19. XXXA]  1. Fall, initial encounter  Tdap Vaccine greater than or equal to 8yo        Reviewed treatment plan with patient and . Patient Instructions   For the abrasions, apply triple antibiotic ointment to the abrasions you have. Your  will be watching you to make sure you are ok. He will be checking for symptoms such as headache, dizziness, lightheadedness, confusion, vomiting- if any of these happen - Proceed to ER. Chief Complaint     Chief Complaint   Patient presents with   • Fall     Patient reports she tripped and fell hitting her face on the cement [place] the [employer]. History of Present Illness       States she was getting out of [employer] when she tripped over a metal bar and landed forward with both hands first first. Did hit her glasses and it has left injuries to bridge of her nose and right forehead. Review of Systems   Review of Systems   Constitutional: Negative for chills and fever. HENT: Negative for ear pain and sore throat. Eyes: Negative for pain and visual disturbance. Respiratory: Negative for cough and shortness of breath. Cardiovascular: Negative for chest pain and palpitations. Gastrointestinal: Negative for abdominal pain and vomiting. Genitourinary: Negative for dysuria and hematuria. Musculoskeletal: Negative for arthralgias and back pain. Skin: Positive for wound. Negative for color change and rash. Neurological: Negative for dizziness, seizures, syncope, weakness and numbness. All other systems reviewed and are negative.         Current Medications       Current Outpatient Medications:   •  acetaminophen (TYLENOL) 650 mg CR tablet, , Disp: , Rfl:   •  albuterol (PROVENTIL HFA,VENTOLIN HFA) 90 mcg/act inhaler, Inhale 2 puffs every 4 (four) hours, Disp: 54 g, Rfl: 3  •  alendronate (Fosamax) 70 mg tablet, Take 1 tablet (70 mg total) by mouth every 7 days, Disp: 13 tablet, Rfl: 3  •  amLODIPine (NORVASC) 10 mg tablet, Take 1 tablet (10 mg total) by mouth daily, Disp: 90 tablet, Rfl: 3  •  beclomethasone (QVAR REDIHALER) 40 MCG/ACT inhaler, Inhale 2 puffs daily Rinse mouth after use., Disp: 10.6 g, Rfl: 3  •  BLACK COHOSH PO, 1 tab twice daily, Disp: , Rfl:   •  Calcium Carbonate-Vitamin D (CALCIUM 500/D PO), Take 2 tablets by mouth, Disp: , Rfl:   •  Cholecalciferol 125 MCG (5000 UT) capsule, Take 5,000 Units by mouth daily , Disp: , Rfl:   •  clobetasol (TEMOVATE) 0.05 % cream, Apply topically Taken as needed, Disp: , Rfl:   •  clotrimazole-betamethasone (LOTRISONE) 1-0.05 % cream, Apply topically 2 (two) times a day (Patient taking differently: Apply topically 2 (two) times a day Taken as needed), Disp: 30 g, Rfl: 0  •  ferrous sulfate 325 (65 Fe) mg tablet, One tablet Monday Wednesday Friday, Disp: , Rfl:   •  fluticasone (FLONASE) 50 mcg/act nasal spray, 2 sprays into each nostril, Disp: , Rfl:   •  levothyroxine 88 mcg tablet, Take 1 tablet (88 mcg total) by mouth daily, Disp: 90 tablet, Rfl: 3  •  metFORMIN (GLUCOPHAGE-XR) 500 mg 24 hr tablet, Take 1 tablet (500 mg total) by mouth daily, Disp: 90 tablet, Rfl: 3  •  multivitamin (THERAGRAN) TABS, Take 1 tablet by mouth, Disp: , Rfl:   •  mupirocin (BACTROBAN) 2 % ointment, , Disp: , Rfl:   •  Omega-3 Fatty Acids (FISH OIL) 1200 MG CAPS, Take by mouth, Disp: , Rfl:   •  simvastatin (ZOCOR) 10 mg tablet, Take 1 tablet (10 mg total) by mouth daily, Disp: 90 tablet, Rfl: 3  •  triamcinolone (KENALOG) 0.1 % ointment, , Disp: , Rfl:     Current Allergies     Allergies as of 09/21/2023 - Reviewed 09/21/2023   Allergen Reaction Noted   • Latex Anaphylaxis 11/09/2015   • Ace inhibitors Other (See Comments)    • Atenolol Other (See Comments) 04/23/2018   • Beta adrenergic blockers Other (See Comments) 11/09/2015   • Lisinopril  04/23/2018   • Prednisolone Other (See Comments)    • Sulfa antibiotics Other (See Comments) 05/28/2015   • Tobramycin  04/23/2018   • Trimethoprim  04/23/2018   • Bacitracin Rash 11/09/2015   • Nifedipine Palpitations 05/28/2015            The following portions of the patient's history were reviewed and updated as appropriate: allergies, current medications, past family history, past medical history, past social history, past surgical history and problem list.     Past Medical History:   Diagnosis Date   • Acquired hypothyroidism 1/7/2010   • Anxiety state 7/29/2010   • Chronic low back pain 6/3/2015   • Eczema 3/11/2014   • Hearing loss 1/14/2014   • Hyperlipidemia 6/3/2015   • Hypertension    • Iron deficiency anemia 4/6/2010   • Mild persistent asthma without complication 45/37/0624   • Non morbid obesity due to excess calories 9/6/2017   • Osteoarthritis 11/9/2015   • Osteoporosis    • Rectocele     grade 2   • Skin cancer     treating with dermatologist   • Type 2 diabetes mellitus without complication, without long-term current use of insulin ([address]) 12/02/2018   • Vitamin D deficiency 7/17/2013       Past Surgical History:   Procedure Laterality Date   • APPENDECTOMY     • CATARACT EXTRACTION Bilateral     and lens implants   • CHOLECYSTECTOMY     • HYSTERECTOMY      age 41   • KNEE SURGERY Bilateral     Revision of left knee 2006   • [facility]   • MYRINGOTOMY W/ TUBES Right 02/08/2021    tube insertion by Dr. Geena Trent   • TONSILLECTOMY     • TOTAL ABDOMINAL HYSTERECTOMY W/ BILATERAL SALPINGOOPHORECTOMY      age 41   • TUBAL LIGATION         Family History   Problem Relation Age of Onset   • Stroke Mother    • Heart disease Father    • No Known Problems Sister    • No Known Problems Daughter    • No Known Problems Maternal Grandmother    • No Known Problems Maternal Grandfather    • No Known Problems Paternal Grandmother    • No Known Problems Paternal Grandfather    • No Known Problems Maternal Aunt    • No Known Problems Maternal Aunt    • No Known Problems Paternal Aunt    • No Known Problems Paternal Aunt    • Breast cancer Neg Hx          Medications have been verified. Objective   /80   Pulse 91   Temp 98.6 °F (37 °C)   Resp 18   SpO2 96%   No LMP recorded. Patient has had a hysterectomy. Physical Exam     Physical Exam  Vitals and nursing note reviewed. Constitutional:       Appearance: Normal appearance. HENT:      Head: Normocephalic and atraumatic. Pulmonary:      Effort: Pulmonary effort is normal.   Skin:     General: Skin is warm and dry. Capillary Refill: Capillary refill takes less than 2 seconds. Neurological:      General: No focal deficit present. Mental Status: She is alert and oriented to person, place, and time. Mental status is at baseline. Sensory: No sensory deficit. Motor: No weakness. Psychiatric:         Mood and Affect: Mood normal.         Behavior: Behavior normal.         Thought Content:  Thought content normal.

## 2023-11-16 ENCOUNTER — OFFICE VISIT (OUTPATIENT)
Age: 83
End: 2023-11-16
Payer: COMMERCIAL

## 2023-11-16 ENCOUNTER — APPOINTMENT (OUTPATIENT)
Age: 83
End: 2023-11-16
Payer: COMMERCIAL

## 2023-11-16 VITALS
BODY MASS INDEX: 30 KG/M2 | DIASTOLIC BLOOD PRESSURE: 74 MMHG | OXYGEN SATURATION: 97 % | HEIGHT: 62 IN | WEIGHT: 163 LBS | HEART RATE: 86 BPM | TEMPERATURE: 98.1 F | RESPIRATION RATE: 16 BRPM | SYSTOLIC BLOOD PRESSURE: 130 MMHG
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
## 2023-11-16 DIAGNOSIS — S99.921A INJURY OF RIGHT TOE, INITIAL ENCOUNTER: ICD-10-CM

## 2023-11-16 DIAGNOSIS — S51.811A SKIN TEAR OF RIGHT FOREARM WITHOUT COMPLICATION, INITIAL ENCOUNTER: Primary | ICD-10-CM

## 2023-11-16 DIAGNOSIS — S01.80XA OPEN WOUND OF FACE WITHOUT COMPLICATION, INITIAL ENCOUNTER: ICD-10-CM

## 2023-11-16 PROCEDURE — 99214 OFFICE O/P EST MOD 30 MIN: CPT | Performed by: PHYSICIAN ASSISTANT

## 2023-11-16 PROCEDURE — 73630 X-RAY EXAM OF FOOT: CPT

## 2023-11-16 NOTE — PATIENT INSTRUCTIONS
Skin Tear   WHAT YOU NEED TO KNOW:   A skin tear occurs when the layers of weakened skin split open from an injury. It is important to treat and prevent skin tears to prevent infection. DISCHARGE INSTRUCTIONS:   Call your doctor if:   You have a fever or chills. Blood soaks through your bandage. You have redness, swelling, pus, or a bad odor coming from your wound. You have severe pain. Your wound tears open again. You have questions or concerns about your condition or care. Medicines:   Medicines  may be given to decrease pain or treat a bacterial infection. Take your medicine as directed. Contact your healthcare provider if you think your medicine is not helping or if you have side effects. Tell your provider if you are allergic to any medicine. Keep a list of the medicines, vitamins, and herbs you take. Include the amounts, and when and why you take them. Bring the list or the pill bottles to follow-up visits. Carry your medicine list with you in case of an emergency. Prevent a skin tear:   Clean, moisturize, and protect your skin. Baths, hot showers, and soap can dry your skin and increase your risk for skin tears. Take lukewarm showers, use mild soap as directed, and gently pat your skin dry. Use lotion to keep your skin moist after you shower. Wear long sleeves, pants, and protective footwear. Move carefully. Ask for help if you cannot lift yourself. Do not drag your skin when you move. Keep your home safe. Cover sharp corners, keep your pathways clear, and turn on lights so you can see clearly. Ask for more information if you have questions about home safety. Drink liquids as directed. Ask your provider how much liquid to drink each day and which liquids are best for you. Liquids will help keep your skin moist and protected from another skin tear. Eat high-protein foods  to help with wound healing.  Examples are lean meats, fish, low-fat dairy products, and beans.    Follow up with your doctor as directed:  Write down your questions so you remember to ask them during your visits. © Copyright [employer] 2023 Information is for End User's use only and may not be sold, redistributed or otherwise used for commercial purposes. The above information is an  only. It is not intended as medical advice for individual conditions or treatments. Talk to your doctor, nurse or pharmacist before following any medical regimen to see if it is safe and effective for you.

## 2023-11-18 ENCOUNTER — OFFICE VISIT (OUTPATIENT)
Age: 83
End: 2023-11-18
Payer: COMMERCIAL

## 2023-11-18 VITALS
WEIGHT: 163 LBS | DIASTOLIC BLOOD PRESSURE: 76 MMHG | SYSTOLIC BLOOD PRESSURE: 139 MMHG | BODY MASS INDEX: 30 KG/M2 | OXYGEN SATURATION: 96 % | TEMPERATURE: 97.8 F | HEIGHT: 62 IN | RESPIRATION RATE: 18 BRPM | HEART RATE: 51 BPM

## 2023-11-18 DIAGNOSIS — S51.811D SKIN TEAR OF RIGHT FOREARM WITHOUT COMPLICATION, SUBSEQUENT ENCOUNTER: Primary | ICD-10-CM

## 2023-11-18 DIAGNOSIS — S92.421A CLOSED DISPLACED FRACTURE OF DISTAL PHALANX OF RIGHT GREAT TOE, INITIAL ENCOUNTER: ICD-10-CM

## 2023-11-18 PROCEDURE — 99213 OFFICE O/P EST LOW 20 MIN: CPT | Performed by: PHYSICIAN ASSISTANT

## 2023-11-18 RX ORDER — CEPHALEXIN 500 MG/1
500 CAPSULE ORAL EVERY 8 HOURS SCHEDULED
Qty: 21 CAPSULE | Refills: 0 | Status: SHIPPED | OUTPATIENT
Start: 2023-11-18 | End: 2023-11-25

## 2023-11-18 NOTE — PATIENT INSTRUCTIONS
Toe Fracture   WHAT YOU NEED TO KNOW:   A toe fracture is a break in a bone in your toe. DISCHARGE INSTRUCTIONS:   Return to the emergency department if:   Blood soaks through your bandage. You have severe pain in your toe. Your toe is cold or numb. Call your doctor if:   You have a fever. Your pain does not go away, even after treatment. Your toe continues to hurt even after it has healed. You have questions or concerns about your condition or care. Medicines: You may need any of the following:  NSAIDs , such as ibuprofen, help decrease swelling, pain, and fever. This medicine is available with or without a doctor's order. NSAIDs can cause stomach bleeding or kidney problems in certain people. If you take blood thinner medicine, always ask your healthcare provider if NSAIDs are safe for you. Always read the medicine label and follow directions. Prescription pain medicine  may be given. Ask your healthcare provider how to take this medicine safely. Some prescription pain medicines contain acetaminophen. Do not take other medicines that contain acetaminophen without talking to your healthcare provider. Too much acetaminophen may cause liver damage. Prescription pain medicine may cause constipation. Ask your healthcare provider how to prevent or treat constipation. Antibiotics  treat a bacterial infection. You may need antibiotics if you have an open wound. Take your medicine as directed. Contact your healthcare provider if you think your medicine is not helping or if you have side effects. Tell your provider if you are allergic to any medicine. Keep a list of the medicines, vitamins, and herbs you take. Include the amounts, and when and why you take them. Bring the list or the pill bottles to follow-up visits. Carry your medicine list with you in case of an emergency. Self-care:   Rest  your toe so that it can heal. Return to normal activities as directed.     Apply ice  on your toe for 15 to 20 minutes every hour or as directed. Use an ice pack, or put crushed ice in a plastic bag. Cover it with a towel before you put it on your toe. Ice helps prevent tissue damage and decreases swelling and pain. Elevate  your toe above the level of your heart as often as you can. This will help decrease swelling and pain. Prop your toe on pillows or blankets to keep it elevated comfortably. Use carly tape, an elastic bandage, or a splint  as directed. These help keep your toe in its correct position as it heals. Carly tape means your fractured toe and the toe next to it are taped together. Use a support device  such as a cane, crutches, walking boot, or hard soled shoe as directed. These help protect your toe and limit movement so it can heal.       Follow up with your doctor as directed: You may need to return in 2 to 4 weeks. Write down your questions so you remember to ask them during your visits. © Copyright [employer] 2023 Information is for End User's use only and may not be sold, redistributed or otherwise used for commercial purposes. The above information is an  only. It is not intended as medical advice for individual conditions or treatments. Talk to your doctor, nurse or pharmacist before following any medical regimen to see if it is safe and effective for you.

## 2023-11-19 NOTE — PROGRESS NOTES
[facility]        NAME: Elijah Johnson is a 82 y.o. female  : 1940    MRN: [id number]  DATE: 2023  TIME: 9:14 AM    Assessment and Plan   Skin tear of right forearm without complication, initial encounter [S51.811A]  1. Skin tear of right forearm without complication, initial encounter        2. Open wound of face without complication, initial encounter        3. Injury of right toe, initial encounter        4. Fall, initial encounter  XR foot 3+ vw right            Patient Instructions     Patient has skin tears to the right forearm and the right temporal region. Wounds were cleansed and sterile dressings were placed. X-ray of the right foot reveals concern for possible fracture of the right great toe but patient also has arthritis so she was placed in a postop shoe pending radiologist read. Recommended ice, elevation, limited weightbearing, over-the-counter analgesics. Recommended she have wounds reexamined in 2 to 3 days. Follow up with PCP in 3-5 days. Proceed to  ER if symptoms worsen. Chief Complaint     Chief Complaint   Patient presents with    Fall     Going down steps, missed a step and went down. Happened 1-2 hours ago. Hit RIGHT side of head, caused skin tear to RIGHT forearm, injured RIGHT big toe. - thinners, - LOC, + head strike. History of Present Illness       Patient presents after suffering fall downstairs which occurred at home shortly prior to arrival.  She reports open wounds on her forearm and the right side of her head in the temporal region as she did strike her head but denies loss of consciousness and does not have headache or any concussion symptoms. She also has pain, swelling, and bruising in her right great toe. Denies any other area of injury. She is not on anticoagulants. Fall        Review of Systems   Review of Systems   Constitutional: Negative. Eyes: Negative. Respiratory: Negative. Cardiovascular: Negative. Gastrointestinal: Negative. Genitourinary: Negative. Skin:  Positive for wound (Right forearm and right temple). Right great toe pain, swelling, bruising status post fall with injury   Neurological: Negative.           Current Medications       Current Outpatient Medications:     acetaminophen (TYLENOL) 650 mg CR tablet, , Disp: , Rfl:     albuterol (PROVENTIL HFA,VENTOLIN HFA) 90 mcg/act inhaler, Inhale 2 puffs every 4 (four) hours, Disp: 54 g, Rfl: 3    alendronate (Fosamax) 70 mg tablet, Take 1 tablet (70 mg total) by mouth every 7 days, Disp: 13 tablet, Rfl: 3    amLODIPine (NORVASC) 10 mg tablet, Take 1 tablet (10 mg total) by mouth daily, Disp: 90 tablet, Rfl: 3    BLACK COHOSH PO, 1 tab twice daily, Disp: , Rfl:     Calcium Carbonate-Vitamin D (CALCIUM 500/D PO), Take 2 tablets by mouth, Disp: , Rfl:     Cholecalciferol 125 MCG (5000 UT) capsule, Take 5,000 Units by mouth daily , Disp: , Rfl:     clobetasol (TEMOVATE) 0.05 % cream, Apply topically Taken as needed, Disp: , Rfl:     clotrimazole-betamethasone (LOTRISONE) 1-0.05 % cream, Apply topically 2 (two) times a day (Patient not taking: Reported on 11/18/2023), Disp: 30 g, Rfl: 0    ferrous sulfate 325 (65 Fe) mg tablet, One tablet Monday Wednesday Friday, Disp: , Rfl:     fluticasone (FLONASE) 50 mcg/act nasal spray, 2 sprays into each nostril, Disp: , Rfl:     levothyroxine 88 mcg tablet, Take 1 tablet (88 mcg total) by mouth daily, Disp: 90 tablet, Rfl: 3    metFORMIN (GLUCOPHAGE-XR) 500 mg 24 hr tablet, Take 1 tablet (500 mg total) by mouth daily, Disp: 90 tablet, Rfl: 3    multivitamin (THERAGRAN) TABS, Take 1 tablet by mouth, Disp: , Rfl:     mupirocin (BACTROBAN) 2 % ointment, , Disp: , Rfl:     Omega-3 Fatty Acids (FISH OIL) 1200 MG CAPS, Take by mouth, Disp: , Rfl:     simvastatin (ZOCOR) 10 mg tablet, Take 1 tablet (10 mg total) by mouth daily, Disp: 90 tablet, Rfl: 3    triamcinolone (KENALOG) 0.1 % ointment, , Disp: , Rfl: beclomethasone (QVAR REDIHALER) 40 MCG/ACT inhaler, Inhale 2 puffs daily Rinse mouth after use., Disp: 10.6 g, Rfl: 3    cephalexin (KEFLEX) 500 mg capsule, Take 1 capsule (500 mg total) by mouth every 8 (eight) hours for 7 days, Disp: 21 capsule, Rfl: 0    Current Allergies     Allergies as of 11/16/2023 - Reviewed 11/16/2023   Allergen Reaction Noted    Latex Anaphylaxis 11/09/2015    Ace inhibitors Other (See Comments)     Atenolol Other (See Comments) 04/23/2018    Beta adrenergic blockers Other (See Comments) 11/09/2015    Lisinopril  04/23/2018    Prednisolone Other (See Comments)     Sulfa antibiotics Other (See Comments) 05/28/2015    Tobramycin  04/23/2018    Trimethoprim  04/23/2018    Bacitracin Rash 11/09/2015    Nifedipine Palpitations 05/28/2015            The following portions of the patient's history were reviewed and updated as appropriate: allergies, current medications, past family history, past medical history, past social history, past surgical history and problem list.     Past Medical History:   Diagnosis Date    Acquired hypothyroidism 1/7/2010    Anxiety state 7/29/2010    Chronic low back pain 6/3/2015    Eczema 3/11/2014    Hearing loss 1/14/2014    Hyperlipidemia 6/3/2015    Hypertension     Iron deficiency anemia 4/6/2010    Mild persistent asthma without complication 07/92/9698    Non morbid obesity due to excess calories 9/6/2017    Osteoarthritis 11/9/2015    Osteoporosis     Rectocele     grade 2    Skin cancer     treating with dermatologist    Type 2 diabetes mellitus without complication, without long-term current use of insulin ([address]) 12/02/2018    Vitamin D deficiency 7/17/2013       Past Surgical History:   Procedure Laterality Date    APPENDECTOMY      CATARACT EXTRACTION Bilateral     and lens implants    CHOLECYSTECTOMY      HYSTERECTOMY      age 41    KNEE SURGERY Bilateral     Revision of left knee 2006    [facility]    MYRINGOTOMY W/ TUBES Right 02/08/2021    tube insertion by Dr. Pak Horse      age 41    TUBAL LIGATION         Family History   Problem Relation Age of Onset    Stroke Mother     Heart disease Father     No Known Problems Sister     No Known Problems Daughter     No Known Problems Maternal Grandmother     No Known Problems Maternal Grandfather     No Known Problems Paternal Grandmother     No Known Problems Paternal Grandfather     No Known Problems Maternal Aunt     No Known Problems Maternal Aunt     No Known Problems Paternal Aunt     No Known Problems Paternal Aunt     Breast cancer Neg Hx          Medications have been verified. Objective   /74 (BP Location: Left arm, Patient Position: Sitting, Cuff Size: Standard)   Pulse 86   Temp 98.1 °F (36.7 °C) (Tympanic)   Resp 16   Ht 5' 2" (1.575 m)   Wt 73.9 kg (163 lb)   SpO2 97%   BMI 29.81 kg/m²   No LMP recorded. Patient has had a hysterectomy. Physical Exam     Physical Exam  Vitals reviewed. Constitutional:       General: She is not in acute distress. Appearance: She is well-developed. HENT:      Head: Normocephalic. Ears:      Comments: No hemotympanum  Eyes:      Extraocular Movements: Extraocular movements intact. Pupils: Pupils are equal, round, and reactive to light. Musculoskeletal:      Comments: Right great toe with tenderness to palpation, soft tissue swelling, ecchymosis. No damage to nail. Remainder of right foot exam is overall benign. Skin:     Comments: Right dorsal forearm with large skin tear. Mild active bleeding. Patient also has small skin tear right temporal region with mild active bleeding. Neurological:      General: No focal deficit present. Mental Status: She is alert and oriented to person, place, and time. Cranial Nerves: No cranial nerve deficit.       Coordination: Coordination normal.      Gait: Gait normal.

## 2023-11-20 ENCOUNTER — TELEPHONE (OUTPATIENT)
Age: 83
End: 2023-11-20

## 2023-11-22 NOTE — PROGRESS NOTES
[facility]        NAME: Mohan Jimenez is a 82 y.o. female  : 1940    MRN: [id number]  DATE: 2023  TIME: 6:30 PM    Assessment and Plan   Skin tear of right forearm without complication, subsequent encounter [O83.687R]  1. Skin tear of right forearm without complication, subsequent encounter        2. Closed displaced fracture of distal phalanx of right great toe, initial encounter  Ambulatory Referral to Orthopedic Surgery    cephalexin (KEFLEX) 500 mg capsule            Patient Instructions     Patient's skin tear of the right forearm is well-healing with no sign of infection. Dressing was changed. Regarding her right great toe injury, radiology did read it as a fracture of the distal phalanx and she has an open mildly bleeding/draining area proximal to the base of the nail. I prescribed her oral antibiotic and sterile dressing was placed and she was referred to orthopedics for consult. Follow up with PCP in 3-5 days. Proceed to  ER if symptoms worsen. Chief Complaint     Chief Complaint   Patient presents with    Wound Check     Sp fall on Thursday, wound to right forearm, right great toe, and right face. History of Present Illness       Presents for reevaluation from previous visit 2 days ago. She has right forearm skin tear which she has kept clean, dressed, and dry and denies any active bleeding, drainage, or any other sign of infection. Regarding her right great toe, she still has significant discomfort as well as swelling and bruising and now has an area proximal to the nail over the dorsal aspect that is actively bleeding/draining. Denies fever, chills, red streaking proximal to area of concern. She has been walking/weightbearing with the aid of a postop shoe. Wound Check        Review of Systems   Review of Systems   Constitutional: Negative. Respiratory: Negative. Cardiovascular: Negative. Gastrointestinal: Negative. Genitourinary: Negative. Musculoskeletal:         Pain, swelling, bruising right great toe with bleeding/draining area proximal to nail. Skin:  Positive for wound (Right forearm skin tear).          Current Medications       Current Outpatient Medications:     acetaminophen (TYLENOL) 650 mg CR tablet, , Disp: , Rfl:     albuterol (PROVENTIL HFA,VENTOLIN HFA) 90 mcg/act inhaler, Inhale 2 puffs every 4 (four) hours, Disp: 54 g, Rfl: 3    alendronate (Fosamax) 70 mg tablet, Take 1 tablet (70 mg total) by mouth every 7 days, Disp: 13 tablet, Rfl: 3    amLODIPine (NORVASC) 10 mg tablet, Take 1 tablet (10 mg total) by mouth daily, Disp: 90 tablet, Rfl: 3    beclomethasone (QVAR REDIHALER) 40 MCG/ACT inhaler, Inhale 2 puffs daily Rinse mouth after use., Disp: 10.6 g, Rfl: 3    BLACK COHOSH PO, 1 tab twice daily, Disp: , Rfl:     Calcium Carbonate-Vitamin D (CALCIUM 500/D PO), Take 2 tablets by mouth, Disp: , Rfl:     cephalexin (KEFLEX) 500 mg capsule, Take 1 capsule (500 mg total) by mouth every 8 (eight) hours for 7 days, Disp: 21 capsule, Rfl: 0    Cholecalciferol 125 MCG (5000 UT) capsule, Take 5,000 Units by mouth daily , Disp: , Rfl:     clobetasol (TEMOVATE) 0.05 % cream, Apply topically Taken as needed, Disp: , Rfl:     ferrous sulfate 325 (65 Fe) mg tablet, One tablet Monday Wednesday Friday, Disp: , Rfl:     levothyroxine 88 mcg tablet, Take 1 tablet (88 mcg total) by mouth daily, Disp: 90 tablet, Rfl: 3    metFORMIN (GLUCOPHAGE-XR) 500 mg 24 hr tablet, Take 1 tablet (500 mg total) by mouth daily, Disp: 90 tablet, Rfl: 3    multivitamin (THERAGRAN) TABS, Take 1 tablet by mouth, Disp: , Rfl:     mupirocin (BACTROBAN) 2 % ointment, , Disp: , Rfl:     Omega-3 Fatty Acids (FISH OIL) 1200 MG CAPS, Take by mouth, Disp: , Rfl:     simvastatin (ZOCOR) 10 mg tablet, Take 1 tablet (10 mg total) by mouth daily, Disp: 90 tablet, Rfl: 3    triamcinolone (KENALOG) 0.1 % ointment, , Disp: , Rfl:     clotrimazole-betamethasone (LOTRISONE) 1-0.05 % cream, Apply topically 2 (two) times a day (Patient not taking: Reported on 11/18/2023), Disp: 30 g, Rfl: 0    fluticasone (FLONASE) 50 mcg/act nasal spray, 2 sprays into each nostril, Disp: , Rfl:     Current Allergies     Allergies as of 11/18/2023 - Reviewed 11/18/2023   Allergen Reaction Noted    Latex Anaphylaxis 11/09/2015    Ace inhibitors Other (See Comments)     Atenolol Other (See Comments) 04/23/2018    Beta adrenergic blockers Other (See Comments) 11/09/2015    Lisinopril  04/23/2018    Prednisolone Other (See Comments)     Sulfa antibiotics Other (See Comments) 05/28/2015    Tobramycin  04/23/2018    Trimethoprim  04/23/2018    Bacitracin Rash 11/09/2015    Nifedipine Palpitations 05/28/2015            The following portions of the patient's history were reviewed and updated as appropriate: allergies, current medications, past family history, past medical history, past social history, past surgical history and problem list.     Past Medical History:   Diagnosis Date    Acquired hypothyroidism 1/7/2010    Anxiety state 7/29/2010    Chronic low back pain 6/3/2015    Eczema 3/11/2014    Hearing loss 1/14/2014    Hyperlipidemia 6/3/2015    Hypertension     Iron deficiency anemia 4/6/2010    Mild persistent asthma without complication 42/00/9214    Non morbid obesity due to excess calories 9/6/2017    Osteoarthritis 11/9/2015    Osteoporosis     Rectocele     grade 2    Skin cancer     treating with dermatologist    Type 2 diabetes mellitus without complication, without long-term current use of insulin ([address]) 12/02/2018    Vitamin D deficiency 7/17/2013       Past Surgical History:   Procedure Laterality Date    APPENDECTOMY      CATARACT EXTRACTION Bilateral     and lens implants    CHOLECYSTECTOMY      HYSTERECTOMY      age 41    KNEE SURGERY Bilateral     Revision of left knee 2006    Marissa Chakraborty    MYRINGOTOMY W/ TUBES Right 02/08/2021    tube insertion by Dr. Kristi Nieves TONSILLECTOMY      TOTAL ABDOMINAL HYSTERECTOMY W/ BILATERAL SALPINGOOPHORECTOMY      age 41    TUBAL LIGATION         Family History   Problem Relation Age of Onset    Stroke Mother     Heart disease Father     No Known Problems Sister     No Known Problems Daughter     No Known Problems Maternal Grandmother     No Known Problems Maternal Grandfather     No Known Problems Paternal Grandmother     No Known Problems Paternal Grandfather     No Known Problems Maternal Aunt     No Known Problems Maternal Aunt     No Known Problems Paternal Aunt     No Known Problems Paternal Aunt     Breast cancer Neg Hx          Medications have been verified. Objective   /76   Pulse (!) 51   Temp 97.8 °F (36.6 °C)   Resp 18   Ht 5' 2" (1.575 m)   Wt 73.9 kg (163 lb)   SpO2 96%   BMI 29.81 kg/m²   No LMP recorded. Patient has had a hysterectomy. Physical Exam     Physical Exam  Vitals reviewed. Constitutional:       General: She is not in acute distress. Appearance: She is well-developed. Musculoskeletal:      Comments: Right great toe with soft tissue swelling, ecchymosis, tenderness to palpation. Dorsal aspect just proximal to the base of the nail with small superficial open bleeding area. No visible sign of infection noted. Skin:     Comments: Well-healing skin tear right forearm. Tissue appears healthy. There is no active bleeding, discharge, or sign of infection noted. Sterile dressings were changed today. Neurological:      Mental Status: She is alert and oriented to person, place, and time. Sensory: No sensory deficit.

## 2023-11-27 ENCOUNTER — OFFICE VISIT (OUTPATIENT)
Dept: FAMILY MEDICINE CLINIC | Facility: CLINIC | Age: 83
End: 2023-11-27
Payer: COMMERCIAL

## 2023-11-27 VITALS
TEMPERATURE: 97.8 F | DIASTOLIC BLOOD PRESSURE: 70 MMHG | HEIGHT: 62 IN | WEIGHT: 162 LBS | SYSTOLIC BLOOD PRESSURE: 122 MMHG | BODY MASS INDEX: 29.81 KG/M2 | OXYGEN SATURATION: 96 % | HEART RATE: 80 BPM

## 2023-11-27 DIAGNOSIS — G89.29 CHRONIC LOW BACK PAIN WITHOUT SCIATICA, UNSPECIFIED BACK PAIN LATERALITY: ICD-10-CM

## 2023-11-27 DIAGNOSIS — S50.819A ABRASION, FOREARM W/O INFECTION: Primary | ICD-10-CM

## 2023-11-27 DIAGNOSIS — M54.50 CHRONIC LOW BACK PAIN WITHOUT SCIATICA, UNSPECIFIED BACK PAIN LATERALITY: ICD-10-CM

## 2023-11-27 DIAGNOSIS — S92.404S CLOSED NONDISPLACED FRACTURE OF PHALANX OF RIGHT GREAT TOE, UNSPECIFIED PHALANX, SEQUELA: ICD-10-CM

## 2023-11-27 PROCEDURE — 99214 OFFICE O/P EST MOD 30 MIN: CPT | Performed by: FAMILY MEDICINE

## 2023-11-27 RX ORDER — CHLORZOXAZONE 500 MG/1
500 TABLET ORAL 4 TIMES DAILY PRN
Qty: 60 TABLET | Refills: 5 | Status: SHIPPED | OUTPATIENT
Start: 2023-11-27

## 2023-11-27 RX ORDER — DICLOFENAC SODIUM 75 MG/1
75 TABLET, DELAYED RELEASE ORAL 2 TIMES DAILY
Qty: 60 TABLET | Refills: 5 | Status: SHIPPED | OUTPATIENT
Start: 2023-11-27

## 2023-11-27 NOTE — PATIENT INSTRUCTIONS
Abrasion on the right arm appears to be healing nicely. Continue local care. Toe is coming along by history. Refills given for Voltaren and Parafon to use as needed for back pain. Return as scheduled.

## 2023-11-27 NOTE — PROGRESS NOTES
Chief Complaint   Patient presents with    Fall     11/16/23-went to urgent care. Right great toe is fractured and Right Forearm skin tear. HPI   Here after a fall that she sustained on 11/16, 11 days ago. Has a fractured right toe and is being seen by podiatry. Also abrasion of the right forearm which she feels is getting better. Notes that she has some back pain. In the past, had Voltaren and Parafon forte. Past Medical History:   Diagnosis Date    Acquired hypothyroidism 1/7/2010    Anxiety state 7/29/2010    Chronic low back pain 6/3/2015    Eczema 3/11/2014    Hearing loss 1/14/2014    Hyperlipidemia 6/3/2015    Hypertension     Iron deficiency anemia 4/6/2010    Mild persistent asthma without complication 76/66/8671    Non morbid obesity due to excess calories 9/6/2017    Osteoarthritis 11/9/2015    Osteoporosis     Rectocele     grade 2    Skin cancer     treating with dermatologist    Type 2 diabetes mellitus without complication, without long-term current use of insulin ([address]) 12/02/2018    Vitamin D deficiency 7/17/2013        Past Surgical History:   Procedure Laterality Date    APPENDECTOMY      CATARACT EXTRACTION Bilateral     and lens implants    CHOLECYSTECTOMY      HYSTERECTOMY      age 41    KNEE SURGERY Bilateral     Revision of left knee 2006    Lolly Abdi    MYRINGOTOMY W/ TUBES Right 02/08/2021    tube insertion by Dr. Beryl River      age 41    TUBAL LIGATION         Social History     Tobacco Use    Smoking status: Never    Smokeless tobacco: Never   Substance Use Topics    Alcohol use: Never       Social History     Social History Narrative     since 1968. 2 children. Daughter in [place].  to a surgeon at [employer]. Son is a manager at [employer]. .    3 grandsons.  was a  at [employer]. Enjoys reading, scrap booking, swimming. Swims on a regular basis during the summer for 30 minutes. Big [employer] fan. The following portions of the patient's history were reviewed and updated as appropriate: allergies, current medications, past family history, past medical history, past social history, past surgical history, and problem list.      Review of Systems       /70 (BP Location: Left arm, Patient Position: Sitting, Cuff Size: Standard)   Pulse 80   Temp 97.8 °F (36.6 °C) (Temporal)   Ht 5' 2" (1.575 m)   Wt 73.5 kg (162 lb)   SpO2 96%   BMI 29.63 kg/m²      Physical Exam  Cardiovascular:      Pulses: Pulses are weak. Dorsalis pedis pulses are 0 on the right side and 0 on the left side. Feet:      Right foot:      Skin integrity: No ulcer, skin breakdown, erythema, warmth, callus or dry skin. Left foot:      Skin integrity: No ulcer, skin breakdown, erythema, warmth, callus or dry skin. Diabetic Foot Exam    Patient's shoes and socks removed. Right Foot/Ankle   Right Foot Inspection  Skin Exam: skin normal and skin intact. No dry skin, no warmth, no callus, no erythema, no maceration, no abnormal color, no pre-ulcer, no ulcer and no callus. Sensory   Monofilament testing: intact    Vascular  The right DP pulse is 0. Left Foot/Ankle  Left Foot Inspection  Skin Exam: skin normal and skin intact. No dry skin, no warmth, no erythema, no maceration, normal color, no pre-ulcer, no ulcer and no callus. Sensory   Monofilament testing: intact    Vascular  The left DP pulse is 0.      Assign Risk Category  No deformity present  No loss of protective sensation  Weak pulses  Risk: 1      Current Outpatient Medications:     acetaminophen (TYLENOL) 650 mg CR tablet, , Disp: , Rfl:     albuterol (PROVENTIL HFA,VENTOLIN HFA) 90 mcg/act inhaler, Inhale 2 puffs every 4 (four) hours, Disp: 54 g, Rfl: 3    alendronate (Fosamax) 70 mg tablet, Take 1 tablet (70 mg total) by mouth every 7 days, Disp: 13 tablet, Rfl: 3    amLODIPine (NORVASC) 10 mg tablet, Take 1 tablet (10 mg total) by mouth daily, Disp: 90 tablet, Rfl: 3    BLACK COHOSH PO, 1 tab twice daily, Disp: , Rfl:     Calcium Carbonate-Vitamin D (CALCIUM 500/D PO), Take 2 tablets by mouth, Disp: , Rfl:     chlorzoxazone (PARAFON FORTE) 500 mg tablet, Take 1 tablet (500 mg total) by mouth 4 (four) times a day as needed for muscle spasms, Disp: 60 tablet, Rfl: 5    Cholecalciferol 125 MCG (5000 UT) capsule, Take 5,000 Units by mouth daily , Disp: , Rfl:     clobetasol (TEMOVATE) 0.05 % cream, Apply topically Taken as needed, Disp: , Rfl:     clotrimazole-betamethasone (LOTRISONE) 1-0.05 % cream, Apply topically 2 (two) times a day, Disp: 30 g, Rfl: 0    diclofenac (VOLTAREN) 75 mg EC tablet, Take 1 tablet (75 mg total) by mouth 2 (two) times a day, Disp: 60 tablet, Rfl: 5    ferrous sulfate 325 (65 Fe) mg tablet, One tablet Monday Wednesday Friday, Disp: , Rfl:     fluticasone (FLONASE) 50 mcg/act nasal spray, 2 sprays into each nostril, Disp: , Rfl:     levothyroxine 88 mcg tablet, Take 1 tablet (88 mcg total) by mouth daily, Disp: 90 tablet, Rfl: 3    metFORMIN (GLUCOPHAGE-XR) 500 mg 24 hr tablet, Take 1 tablet (500 mg total) by mouth daily, Disp: 90 tablet, Rfl: 3    multivitamin (THERAGRAN) TABS, Take 1 tablet by mouth, Disp: , Rfl:     mupirocin (BACTROBAN) 2 % ointment, , Disp: , Rfl:     Omega-3 Fatty Acids (FISH OIL) 1200 MG CAPS, Take by mouth, Disp: , Rfl:     simvastatin (ZOCOR) 10 mg tablet, Take 1 tablet (10 mg total) by mouth daily, Disp: 90 tablet, Rfl: 3    triamcinolone (KENALOG) 0.1 % ointment, , Disp: , Rfl:     beclomethasone (QVAR REDIHALER) 40 MCG/ACT inhaler, Inhale 2 puffs daily Rinse mouth after use., Disp: 10.6 g, Rfl: 3     No problem-specific Assessment & Plan notes found for this encounter.        Diagnoses and all orders for this visit:    Abrasion, forearm w/o infection    Chronic low back pain without sciatica, unspecified back pain laterality  -     diclofenac (VOLTAREN) 75 mg EC tablet; Take 1 tablet (75 mg total) by mouth 2 (two) times a day  -     chlorzoxazone (PARAFON FORTE) 500 mg tablet; Take 1 tablet (500 mg total) by mouth 4 (four) times a day as needed for muscle spasms    Closed nondisplaced fracture of phalanx of right great toe, unspecified phalanx, sequela        Patient Instructions   Abrasion on the right arm appears to be healing nicely. Continue local care. Toe is coming along by history. Refills given for Voltaren and Parafon to use as needed for back pain. Return as scheduled.

## 2024-01-27 ENCOUNTER — OFFICE VISIT (OUTPATIENT)
Age: 84
End: 2024-01-27
Payer: COMMERCIAL

## 2024-01-27 ENCOUNTER — APPOINTMENT (OUTPATIENT)
Age: 84
End: 2024-01-27
Payer: COMMERCIAL

## 2024-01-27 VITALS
HEART RATE: 83 BPM | SYSTOLIC BLOOD PRESSURE: 138 MMHG | OXYGEN SATURATION: 97 % | BODY MASS INDEX: 29.66 KG/M2 | DIASTOLIC BLOOD PRESSURE: 58 MMHG | TEMPERATURE: 98 F | HEIGHT: 62 IN | RESPIRATION RATE: 18 BRPM | WEIGHT: 161.16 LBS

## 2024-01-27 DIAGNOSIS — M25.512 ACUTE PAIN OF LEFT SHOULDER: Primary | ICD-10-CM

## 2024-01-27 DIAGNOSIS — M25.512 ACUTE PAIN OF LEFT SHOULDER: ICD-10-CM

## 2024-01-27 PROCEDURE — 99213 OFFICE O/P EST LOW 20 MIN: CPT

## 2024-01-27 PROCEDURE — 73030 X-RAY EXAM OF SHOULDER: CPT

## 2024-01-27 NOTE — PROGRESS NOTES
[facility]        NAME: Mechelle Maldonado is a 83 y.o. female  : 1940    MRN: [id number]  DATE: 2024  TIME: 11:22 AM    Assessment and Plan   Acute pain of left shoulder [M25.512]  1. Acute pain of left shoulder  XR shoulder 2+ vw left        Informed Mechelle that due to her age and Beers Criteria, her current regimen of Acetaminophen, topical pain patch, and heat are the most I can recommend at this time. Beers Criteria recommends against providing opioids and muscle relaxants at her age. She also has had multiple falls in the last several months.    Patient Instructions   Preliminary reading of  left shoulder X-ray: no acute fracture, there are signs of arthritis.  Radiologist will have final reading- if that is different I will call you.    Heat to affected area: 15 minutes every 3 hours as needed throughout the day  Topical pain medication such as icy/hot, Biofreeze, Salon pas, etc.  Acetaminophen - 325-1000 mg orally every 4-6 hours when required, maximum 4000 mg/day    Follow up with orthopedics as we had discussed.    Chief Complaint     Chief Complaint   Patient presents with    Back Pain     Back and shoulder pain started 24. Worse at night. Taking tylenol 500mg 3-4x a day. Takes the edge off. Fell back in November but had healed from that.          History of Present Illness       Left shoulder and upper back pain starting 3 days ago. No injuries. Right hand dominant. Was not lifting anything heavy. States pain is worse at night. Has been using lidocaine patches, taking acetaminophen and also a topical spray for pain.         Review of Systems   Review of Systems   Respiratory:  Negative for cough and shortness of breath.    Musculoskeletal:  Positive for myalgias (Left upper back and left shoulder) and neck pain. Negative for joint swelling and neck stiffness.   Neurological:  Negative for dizziness, weakness and light-headedness.         Current Medications       Current  Outpatient Medications:     acetaminophen (TYLENOL) 650 mg CR tablet, , Disp: , Rfl:     albuterol (PROVENTIL HFA,VENTOLIN HFA) 90 mcg/act inhaler, Inhale 2 puffs every 4 (four) hours, Disp: 54 g, Rfl: 3    alendronate (Fosamax) 70 mg tablet, Take 1 tablet (70 mg total) by mouth every 7 days, Disp: 13 tablet, Rfl: 3    amLODIPine (NORVASC) 10 mg tablet, Take 1 tablet (10 mg total) by mouth daily, Disp: 90 tablet, Rfl: 3    beclomethasone (QVAR REDIHALER) 40 MCG/ACT inhaler, Inhale 2 puffs daily Rinse mouth after use., Disp: 10.6 g, Rfl: 3    BLACK COHOSH PO, 1 tab twice daily, Disp: , Rfl:     Calcium Carbonate-Vitamin D (CALCIUM 500/D PO), Take 2 tablets by mouth, Disp: , Rfl:     chlorzoxazone (PARAFON FORTE) 500 mg tablet, Take 1 tablet (500 mg total) by mouth 4 (four) times a day as needed for muscle spasms, Disp: 60 tablet, Rfl: 5    Cholecalciferol 125 MCG (5000 UT) capsule, Take 5,000 Units by mouth daily , Disp: , Rfl:     clobetasol (TEMOVATE) 0.05 % cream, Apply topically Taken as needed, Disp: , Rfl:     clotrimazole-betamethasone (LOTRISONE) 1-0.05 % cream, Apply topically 2 (two) times a day, Disp: 30 g, Rfl: 0    diclofenac (VOLTAREN) 75 mg EC tablet, Take 1 tablet (75 mg total) by mouth 2 (two) times a day, Disp: 60 tablet, Rfl: 5    ferrous sulfate 325 (65 Fe) mg tablet, One tablet Monday Wednesday Friday, Disp: , Rfl:     fluticasone (FLONASE) 50 mcg/act nasal spray, 2 sprays into each nostril, Disp: , Rfl:     levothyroxine 88 mcg tablet, Take 1 tablet (88 mcg total) by mouth daily, Disp: 90 tablet, Rfl: 3    metFORMIN (GLUCOPHAGE-XR) 500 mg 24 hr tablet, Take 1 tablet (500 mg total) by mouth daily, Disp: 90 tablet, Rfl: 3    multivitamin (THERAGRAN) TABS, Take 1 tablet by mouth, Disp: , Rfl:     mupirocin (BACTROBAN) 2 % ointment, , Disp: , Rfl:     Omega-3 Fatty Acids (FISH OIL) 1200 MG CAPS, Take by mouth, Disp: , Rfl:     simvastatin (ZOCOR) 10 mg tablet, Take 1 tablet (10 mg total) by mouth  daily, Disp: 90 tablet, Rfl: 3    triamcinolone (KENALOG) 0.1 % ointment, , Disp: , Rfl:     Current Allergies     Allergies as of 01/27/2024 - Reviewed 01/27/2024   Allergen Reaction Noted    Latex Anaphylaxis 11/09/2015    Ace inhibitors Other (See Comments)     Atenolol Other (See Comments) 04/23/2018    Beta adrenergic blockers Other (See Comments) 11/09/2015    Lisinopril  04/23/2018    Prednisolone Other (See Comments)     Sulfa antibiotics Other (See Comments) 05/28/2015    Tobramycin  04/23/2018    Trimethoprim  04/23/2018    Bacitracin Rash 11/09/2015    Nifedipine Palpitations 05/28/2015            The following portions of the patient's history were reviewed and updated as appropriate: allergies, current medications, past family history, past medical history, past social history, past surgical history and problem list.     Past Medical History:   Diagnosis Date    Acquired hypothyroidism 1/7/2010    Anxiety state 7/29/2010    Chronic low back pain 6/3/2015    Eczema 3/11/2014    Hearing loss 1/14/2014    Hyperlipidemia 6/3/2015    Hypertension     Iron deficiency anemia 4/6/2010    Mild persistent asthma without complication 04/06/2010    Non morbid obesity due to excess calories 9/6/2017    Osteoarthritis 11/9/2015    Osteoporosis     Rectocele     grade 2    Skin cancer     treating with dermatologist    Type 2 diabetes mellitus without complication, without long-term current use of insulin ([facility]) 12/02/2018    Vitamin D deficiency 7/17/2013       Past Surgical History:   Procedure Laterality Date    APPENDECTOMY      CATARACT EXTRACTION Bilateral     and lens implants    CHOLECYSTECTOMY      HYSTERECTOMY      age 38    KNEE SURGERY Bilateral     Revision of left knee 2006    LUMBAR DISC SURGERY  1999     Dr. Harrison    MYRINGOTOMY W/ TUBES Right 02/08/2021    tube insertion by Dr. Bahena    TONSILLECTOMY      TOTAL ABDOMINAL HYSTERECTOMY W/ BILATERAL SALPINGOOPHORECTOMY      age 38    TUBAL LIGATION    "      Family History   Problem Relation Age of Onset    Stroke Mother     Heart disease Father     No Known Problems Sister     No Known Problems Daughter     No Known Problems Maternal Grandmother     No Known Problems Maternal Grandfather     No Known Problems Paternal Grandmother     No Known Problems Paternal Grandfather     No Known Problems Maternal Aunt     No Known Problems Maternal Aunt     No Known Problems Paternal Aunt     No Known Problems Paternal Aunt     Breast cancer Neg Hx          Medications have been verified.        Objective   /58   Pulse 83   Temp 98 °F (36.7 °C)   Resp 18   Ht 5' 2\" (1.575 m)   Wt 73.1 kg (161 lb 2.5 oz)   SpO2 97%   BMI 29.48 kg/m²   No LMP recorded. Patient has had a hysterectomy.       Physical Exam     Physical Exam  Vitals and nursing note reviewed.   Constitutional:       Appearance: Normal appearance.   HENT:      Head: Normocephalic and atraumatic.   Pulmonary:      Effort: Pulmonary effort is normal.   Musculoskeletal:      Left shoulder: Tenderness and bony tenderness present. No swelling. Decreased range of motion (Due to pain).        Arms:       Cervical back: Tenderness present.   Skin:     General: Skin is warm and dry.      Capillary Refill: Capillary refill takes less than 2 seconds.   Neurological:      General: No focal deficit present.      Mental Status: She is alert and oriented to person, place, and time. Mental status is at baseline.      Sensory: No sensory deficit.      Motor: No weakness.   Psychiatric:         Mood and Affect: Mood normal.         Behavior: Behavior normal.         Thought Content: Thought content normal.                   "

## 2024-01-27 NOTE — PATIENT INSTRUCTIONS
Preliminary reading of  left shoulder X-ray: no acute fracture, there are signs of arthritis.  Radiologist will have final reading- if that is different I will call you.    Heat to affected area: 15 minutes every 3 hours as needed throughout the day  Topical pain medication such as icy/hot, Biofreeze, Salon pas, etc.  Acetaminophen - 325-1000 mg orally every 4-6 hours when required, maximum 4000 mg/day    Follow up with orthopedics as we had discussed.

## 2024-01-30 ENCOUNTER — LAB (OUTPATIENT)
Age: 84
End: 2024-01-30
Payer: COMMERCIAL

## 2024-01-30 DIAGNOSIS — I10 BENIGN ESSENTIAL HYPERTENSION: ICD-10-CM

## 2024-01-30 DIAGNOSIS — E78.2 MIXED HYPERLIPIDEMIA: ICD-10-CM

## 2024-01-30 DIAGNOSIS — E03.9 ACQUIRED HYPOTHYROIDISM: ICD-10-CM

## 2024-01-30 LAB
ALBUMIN SERPL BCP-MCNC: 4 G/DL (ref 3.5–5)
ALP SERPL-CCNC: 58 U/L (ref 34–104)
ALT SERPL W P-5'-P-CCNC: 11 U/L (ref 7–52)
ANION GAP SERPL CALCULATED.3IONS-SCNC: 5 MMOL/L
AST SERPL W P-5'-P-CCNC: 17 U/L (ref 13–39)
BILIRUB SERPL-MCNC: 0.39 MG/DL (ref 0.2–1)
BUN SERPL-MCNC: 14 MG/DL (ref 5–25)
CALCIUM SERPL-MCNC: 10.2 MG/DL (ref 8.4–10.2)
CHLORIDE SERPL-SCNC: 100 MMOL/L (ref 96–108)
CHOLEST SERPL-MCNC: 156 MG/DL
CO2 SERPL-SCNC: 30 MMOL/L (ref 21–32)
CREAT SERPL-MCNC: 0.58 MG/DL (ref 0.6–1.3)
CREAT UR-MCNC: 68.8 MG/DL
GFR SERPL CREATININE-BSD FRML MDRD: 86 ML/MIN/1.73SQ M
GLUCOSE P FAST SERPL-MCNC: 104 MG/DL (ref 65–99)
HDLC SERPL-MCNC: 77 MG/DL
LDLC SERPL CALC-MCNC: 61 MG/DL (ref 0–100)
MICROALBUMIN UR-MCNC: <7 MG/L
MICROALBUMIN/CREAT 24H UR: <10 MG/G CREATININE (ref 0–30)
NONHDLC SERPL-MCNC: 79 MG/DL
POTASSIUM SERPL-SCNC: 4.2 MMOL/L (ref 3.5–5.3)
PROT SERPL-MCNC: 6.8 G/DL (ref 6.4–8.4)
SODIUM SERPL-SCNC: 135 MMOL/L (ref 135–147)
TRIGL SERPL-MCNC: 88 MG/DL
TSH SERPL DL<=0.05 MIU/L-ACNC: 1.16 UIU/ML (ref 0.45–4.5)

## 2024-01-30 PROCEDURE — 80053 COMPREHEN METABOLIC PANEL: CPT

## 2024-01-30 PROCEDURE — 84443 ASSAY THYROID STIM HORMONE: CPT

## 2024-01-30 PROCEDURE — 36415 COLL VENOUS BLD VENIPUNCTURE: CPT

## 2024-01-30 PROCEDURE — 80061 LIPID PANEL: CPT

## 2024-01-31 ENCOUNTER — TELEPHONE (OUTPATIENT)
Dept: FAMILY MEDICINE CLINIC | Facility: CLINIC | Age: 84
End: 2024-01-31

## 2024-01-31 NOTE — TELEPHONE ENCOUNTER
----- Message from Rik Bell MD sent at 1/30/2024  9:48 PM EST -----  Advise patient that blood work is excellent. All normal.

## 2024-02-05 ENCOUNTER — OFFICE VISIT (OUTPATIENT)
Dept: FAMILY MEDICINE CLINIC | Facility: CLINIC | Age: 84
End: 2024-02-05
Payer: COMMERCIAL

## 2024-02-05 VITALS
SYSTOLIC BLOOD PRESSURE: 120 MMHG | WEIGHT: 161.2 LBS | BODY MASS INDEX: 29.66 KG/M2 | HEART RATE: 76 BPM | HEIGHT: 62 IN | DIASTOLIC BLOOD PRESSURE: 70 MMHG | TEMPERATURE: 97.9 F | OXYGEN SATURATION: 97 %

## 2024-02-05 DIAGNOSIS — J45.20 MILD INTERMITTENT ASTHMA WITHOUT COMPLICATION: ICD-10-CM

## 2024-02-05 DIAGNOSIS — E78.2 MIXED HYPERLIPIDEMIA: ICD-10-CM

## 2024-02-05 DIAGNOSIS — M19.012 PRIMARY OSTEOARTHRITIS OF LEFT SHOULDER: ICD-10-CM

## 2024-02-05 DIAGNOSIS — M81.6 LOCALIZED OSTEOPOROSIS WITHOUT CURRENT PATHOLOGICAL FRACTURE: ICD-10-CM

## 2024-02-05 DIAGNOSIS — E03.9 ACQUIRED HYPOTHYROIDISM: ICD-10-CM

## 2024-02-05 DIAGNOSIS — E11.9 TYPE 2 DIABETES MELLITUS WITHOUT COMPLICATION, WITHOUT LONG-TERM CURRENT USE OF INSULIN (HCC): Primary | ICD-10-CM

## 2024-02-05 DIAGNOSIS — I10 BENIGN ESSENTIAL HYPERTENSION: ICD-10-CM

## 2024-02-05 PROCEDURE — 99214 OFFICE O/P EST MOD 30 MIN: CPT | Performed by: FAMILY MEDICINE

## 2024-02-05 NOTE — PROGRESS NOTES
Chief Complaint   Patient presents with    Follow-up     6 month         HPI   Here for follow-up of hypertension, diabetes, hyperlipidemia, osteoporosis, and hypothyroidism.    Strained her shoulder and neck.  Seen in urgent care about a week ago.  Using ice and heat and Tylenol.  Has an appointment to see orthopedics.  X-ray did show arthritis in her shoulder.  Has pretty good range of motion of her shoulders.        Past Medical History:   Diagnosis Date    Acquired hypothyroidism 1/7/2010    Anxiety state 7/29/2010    Chronic low back pain 6/3/2015    Eczema 3/11/2014    Hearing loss 1/14/2014    Hyperlipidemia 6/3/2015    Hypertension     Iron deficiency anemia 4/6/2010    Mild persistent asthma without complication 04/06/2010    Non morbid obesity due to excess calories 9/6/2017    Osteoarthritis 11/9/2015    Osteoporosis     Rectocele     grade 2    Skin cancer     treating with dermatologist    Type 2 diabetes mellitus without complication, without long-term current use of insulin ([facility]) 12/02/2018    Vitamin D deficiency 7/17/2013        Past Surgical History:   Procedure Laterality Date    APPENDECTOMY      CATARACT EXTRACTION Bilateral     and lens implants    CHOLECYSTECTOMY      HYSTERECTOMY      age 38    KNEE SURGERY Bilateral     Revision of left knee 2006    LUMBAR DISC SURGERY  1999     Dr. Harrison    MYRINGOTOMY W/ TUBES Right 02/08/2021    tube insertion by Dr. Bahena    TONSILLECTOMY      TOTAL ABDOMINAL HYSTERECTOMY W/ BILATERAL SALPINGOOPHORECTOMY      age 38    TUBAL LIGATION         Social History     Tobacco Use    Smoking status: Never    Smokeless tobacco: Never   Substance Use Topics    Alcohol use: Never       Social History     Social History Narrative     since 1968. 2 children.Daughter in [place].  to a surgeon at [employer].  Son is a manager at [employer]..    3 grandsons.      was a  at [employer].    Enjoys reading, scrap booking, swimming.    Swims  "on a regular basis during the summer for 30 minutes.    [employer] fan.        The following portions of the patient's history were reviewed and updated as appropriate: allergies, current medications, past family history, past medical history, past social history, past surgical history, and problem list.      Review of Systems       /70   Pulse 76   Temp 97.9 °F (36.6 °C) (Temporal)   Ht 5' 2\" (1.575 m)   Wt 73.1 kg (161 lb 3.2 oz)   SpO2 97%   BMI 29.48 kg/m²      Physical Exam   Looks great.  Lungs are clear.  Heart regular with no murmur.  Abdomen soft and nontender.  No edema.  Mood upbeat.  Affect appropriate.  Chemistry normal with fasting blood sugar of 104.  Cholesterol 156 with HDL of 77.  Thyroid normal.              Current Outpatient Medications:     acetaminophen (TYLENOL) 650 mg CR tablet, , Disp: , Rfl:     albuterol (PROVENTIL HFA,VENTOLIN HFA) 90 mcg/act inhaler, Inhale 2 puffs every 4 (four) hours, Disp: 54 g, Rfl: 3    alendronate (Fosamax) 70 mg tablet, Take 1 tablet (70 mg total) by mouth every 7 days, Disp: 13 tablet, Rfl: 3    amLODIPine (NORVASC) 10 mg tablet, Take 1 tablet (10 mg total) by mouth daily, Disp: 90 tablet, Rfl: 3    beclomethasone (QVAR REDIHALER) 40 MCG/ACT inhaler, Inhale 2 puffs daily Rinse mouth after use., Disp: 10.6 g, Rfl: 3    BLACK COHOSH PO, 1 tab twice daily, Disp: , Rfl:     Calcium Carbonate-Vitamin D (CALCIUM 500/D PO), Take 2 tablets by mouth, Disp: , Rfl:     chlorzoxazone (PARAFON FORTE) 500 mg tablet, Take 1 tablet (500 mg total) by mouth 4 (four) times a day as needed for muscle spasms, Disp: 60 tablet, Rfl: 5    Cholecalciferol 125 MCG (5000 UT) capsule, Take 5,000 Units by mouth daily , Disp: , Rfl:     clobetasol (TEMOVATE) 0.05 % cream, Apply topically Taken as needed, Disp: , Rfl:     clotrimazole-betamethasone (LOTRISONE) 1-0.05 % cream, Apply topically 2 (two) times a day, Disp: 30 g, Rfl: 0    diclofenac (VOLTAREN) 75 mg EC tablet, Take 1 " tablet (75 mg total) by mouth 2 (two) times a day, Disp: 60 tablet, Rfl: 5    ferrous sulfate 325 (65 Fe) mg tablet, One tablet Monday Wednesday Friday, Disp: , Rfl:     levothyroxine 88 mcg tablet, Take 1 tablet (88 mcg total) by mouth daily, Disp: 90 tablet, Rfl: 3    metFORMIN (GLUCOPHAGE-XR) 500 mg 24 hr tablet, Take 1 tablet (500 mg total) by mouth daily, Disp: 90 tablet, Rfl: 3    multivitamin (THERAGRAN) TABS, Take 1 tablet by mouth, Disp: , Rfl:     mupirocin (BACTROBAN) 2 % ointment, , Disp: , Rfl:     Omega-3 Fatty Acids (FISH OIL) 1200 MG CAPS, Take by mouth, Disp: , Rfl:     simvastatin (ZOCOR) 10 mg tablet, Take 1 tablet (10 mg total) by mouth daily, Disp: 90 tablet, Rfl: 3    triamcinolone (KENALOG) 0.1 % ointment, , Disp: , Rfl:     fluticasone (FLONASE) 50 mcg/act nasal spray, 2 sprays into each nostril, Disp: , Rfl:      No problem-specific Assessment & Plan notes found for this encounter.       Diagnoses and all orders for this visit:    Type 2 diabetes mellitus without complication, without long-term current use of insulin ([facility])  -     POCT hemoglobin A1c    Mild intermittent asthma without complication  -     beclomethasone (QVAR REDIHALER) 40 MCG/ACT inhaler; Inhale 2 puffs daily Rinse mouth after use.    Benign essential hypertension    Localized osteoporosis without current pathological fracture    Acquired hypothyroidism    Mixed hyperlipidemia    Primary osteoarthritis of left shoulder        Patient Instructions   Looks great.  Blood work perfect.  Medications are reviewed and stay the same.  Recheck in 6 months.

## 2024-02-06 ENCOUNTER — TELEPHONE (OUTPATIENT)
Dept: ADMINISTRATIVE | Facility: OTHER | Age: 84
End: 2024-02-06

## 2024-02-06 NOTE — LETTER
Diabetic Eye Exam Form    Date Requested: 24  Patient: Mechelle Maldonado  Patient : 1940   Referring Provider: Rik Bell MD      DIABETIC Eye Exam Date _______________________________      Type of Exam MUST be documented for Diabetic Eye Exams. Please CHECK ONE.     Retinal Exam       Dilated Retinal Exam       OCT       Optomap-Iris Exam      Fundus Photography       Left Eye - Please check Retinopathy or No Retinopathy        Exam did show retinopathy    Exam did not show retinopathy       Right Eye - Please check Retinopathy or No Retinopathy       Exam did show retinopathy    Exam did not show retinopathy       Comments __________________________________________________________    Practice Providing Exam ______________________________________________    Exam Performed By (print name) _______________________________________      Provider Signature ___________________________________________________      These reports are needed for  compliance.  Please fax this completed form and a copy of the Diabetic Eye Exam report to our office located at [address] [place] [address] as soon as possible via Fax [phone] attention Ly: Phone [phone]  We thank you for your assistance in treating our mutual patient.

## 2024-02-06 NOTE — TELEPHONE ENCOUNTER
Upon review of the In Basket request and the patient's chart, initial outreach has been made via fax to facility. Please see Contacts section for details.     Thank you  Ly Albrecht MA

## 2024-02-06 NOTE — TELEPHONE ENCOUNTER
----- Message from Rik Bell MD sent at 2/5/2024 12:48 PM EST -----  Please get results of most recent eye exam from Dr. Rey Da Silva.

## 2024-02-08 ENCOUNTER — APPOINTMENT (OUTPATIENT)
Age: 84
End: 2024-02-08
Payer: COMMERCIAL

## 2024-02-08 ENCOUNTER — OFFICE VISIT (OUTPATIENT)
Age: 84
End: 2024-02-08
Payer: COMMERCIAL

## 2024-02-08 VITALS
HEART RATE: 81 BPM | BODY MASS INDEX: 29.08 KG/M2 | WEIGHT: 159 LBS | SYSTOLIC BLOOD PRESSURE: 123 MMHG | DIASTOLIC BLOOD PRESSURE: 74 MMHG

## 2024-02-08 DIAGNOSIS — S46.812A TRAPEZIUS STRAIN, LEFT, INITIAL ENCOUNTER: ICD-10-CM

## 2024-02-08 DIAGNOSIS — M54.2 NECK PAIN: Primary | ICD-10-CM

## 2024-02-08 DIAGNOSIS — M47.812 CERVICAL SPONDYLOSIS: ICD-10-CM

## 2024-02-08 DIAGNOSIS — M54.2 NECK PAIN: ICD-10-CM

## 2024-02-08 PROCEDURE — 72040 X-RAY EXAM NECK SPINE 2-3 VW: CPT

## 2024-02-08 PROCEDURE — 99203 OFFICE O/P NEW LOW 30 MIN: CPT | Performed by: STUDENT IN AN ORGANIZED HEALTH CARE EDUCATION/TRAINING PROGRAM

## 2024-02-08 NOTE — PROGRESS NOTES
1. Neck pain  XR spine cervical 2 or 3 vw injury      2. Trapezius strain, left, initial encounter        3. Cervical spondylosis          Orders Placed This Encounter   Procedures    XR spine cervical 2 or 3 vw injury        Imaging Studies (I personally reviewed images in PACS and report):    X-ray cervical spine 2/8/2024: No acute osseous abnormalities.  Mild anterolisthesis of C4 on C5 and C5 on C6.  Moderate to severe multilevel disc space narrowing/osteophytosis involving the mid to lower cervical spine.  Concurrent moderate facet disease as well.  X-ray left shoulder 1/27/2024: Moderate degenerative changes of the left acromioclavicular joint.  No acute osseous abnormalities.    IMPRESSION:  Acute atraumatic neck and left  shoulder pain without precipitating injury  Radiographs only noting primary osteoarthritis of the AC joint.  Imaging of her cervical spine today noting multilevel moderate to severe degenerative spondylosis  Symptoms progressively improving with use of lidocaine patching, heat/ice therapy and pain is mainly localized along the base of her neck and left paracervical neck along her trapezius    PLAN:    Clinical exam and radiographic imaging reviewed with patient today, with impression as per above. I have discussed with the patient the pathophysiology of this diagnosis and reviewed how the examination correlates with this diagnosis.    Prior imaging of her left shoulder reviewed today as noted above.  I also obtain imaging of her cervical spine as noted above.  I will follow-up official radiology interpretation.  I reassured patient of her progressive improvement since last visit and counseled that her pain is likely secondary to the osteoarthritis of her neck and the aggravation of the surrounding musculature/trapezius of the left side of her neck possibly radiating into her shoulder.  Recommended continued as needed use of lidocaine patching, acetaminophen, NSAIDs, heat/ice therapy 20  "minutes on/off.  Furthermore I encouraged her either to attend formal physical therapy or do a home exercise program/stretching routine for her neck to prevent recurrent pain in the future.  Patient prefers to do home exercises for now which were supplemented today.  Counseled in certain circumstances if conservative treatments do not progressively improve the symptoms over time that some people do undergo a cervical spine cortisone injections.  Patient prefers to defer this option for now due to her progressive improvement which is reasonable.    Return if symptoms worsen or fail to improve.    Portions of the record may have been created with voice recognition software. Occasional wrong word or \"sound a like\" substitutions may have occurred due to the inherent limitations of voice recognition software. Read the chart carefully and recognize, using context, where substitutions have occurred.     CHIEF COMPLAINT:  Chief Complaint   Patient presents with    Left Shoulder - Pain    Right Shoulder - Pain         HPI:  Mechelle Maldonado is a 83 y.o. female  who presents with her significant other for       Visit 2/8/2024:  Initial evaluation of neck and left shoulder pain:  Ongoing issue for the past 2 weeks without a precipitating injury.  Patient states she will routinely have off-and-on pain of her neck over the years that she suspected was due to osteoarthritis.  She states it would be mainly aggravated from cold weather or rainy days.  She states that her most recent aggravation was during a cold day and that she was doing an activity where her neck was flexed for a prolonged period of time.  She states pain was originally along the midline and left paracervical aspect of her neck and radiated to her shoulder.  She had seen urgent care which obtain imaging as noted above as well as her PCP.  Both notes were reviewed today.,  Patient states she has been treating conservatively with use of Tylenol, heat/ice therapy, " lidocaine patching all of which have provided relief of her symptoms.  She actually reports today that over the past day or 2 or pain has been much more controlled and that she no longer has shoulder pain but does feel some pain along the left paracervical aspect of her neck and points to her trapezius as well as the base of her neck.  She reports stiffness of her neck with certain range of motion movements and describes the pain as a pulling/tight sensation.  She denies any numbness/tingling of her upper extremities.  She denies prior surgeries or injections of her cervical spine in the past.    Medical, Surgical, Family, and Social History    Past Medical History:   Diagnosis Date    Acquired hypothyroidism 1/7/2010    Anxiety state 7/29/2010    Chronic low back pain 6/3/2015    Eczema 3/11/2014    Hearing loss 1/14/2014    Hyperlipidemia 6/3/2015    Hypertension     Iron deficiency anemia 4/6/2010    Mild persistent asthma without complication 04/06/2010    Non morbid obesity due to excess calories 9/6/2017    Osteoarthritis 11/9/2015    Osteoporosis     Rectocele     grade 2    Skin cancer     treating with dermatologist    Type 2 diabetes mellitus without complication, without long-term current use of insulin ([facility]) 12/02/2018    Vitamin D deficiency 7/17/2013     Past Surgical History:   Procedure Laterality Date    APPENDECTOMY      CATARACT EXTRACTION Bilateral     and lens implants    CHOLECYSTECTOMY      HYSTERECTOMY      age 38    KNEE SURGERY Bilateral     Revision of left knee 2006    LUMBAR DISC SURGERY  1999     Dr. Harrison    MYRINGOTOMY W/ TUBES Right 02/08/2021    tube insertion by Dr. Bahena    TONSILLECTOMY      TOTAL ABDOMINAL HYSTERECTOMY W/ BILATERAL SALPINGOOPHORECTOMY      age 38    TUBAL LIGATION       Social History   Social History     Substance and Sexual Activity   Alcohol Use Never     Social History     Substance and Sexual Activity   Drug Use No     Social History     Tobacco Use    Smoking Status Never   Smokeless Tobacco Never     Family History   Problem Relation Age of Onset    Stroke Mother     Heart disease Father     No Known Problems Sister     No Known Problems Daughter     No Known Problems Maternal Grandmother     No Known Problems Maternal Grandfather     No Known Problems Paternal Grandmother     No Known Problems Paternal Grandfather     No Known Problems Maternal Aunt     No Known Problems Maternal Aunt     No Known Problems Paternal Aunt     No Known Problems Paternal Aunt     Breast cancer Neg Hx      Allergies   Allergen Reactions    Latex Anaphylaxis    Ace Inhibitors Other (See Comments)     LISINOPRIL -cough and shortness of breath    Atenolol Other (See Comments)    Beta Adrenergic Blockers Other (See Comments)     SOB,WHEEZING    Lisinopril     Prednisolone Other (See Comments)     PRED FORTE(swollen eyes/cheek-per nurse    Sulfa Antibiotics Other (See Comments)      Reaction unknown    Tobramycin     Trimethoprim      Other reaction(s): Rash    Bacitracin Rash    Nifedipine Palpitations          Physical Exam  /74   Pulse 81   Wt 72.1 kg (159 lb)   BMI 29.08 kg/m²     Constitutional:  see vital signs  Gen: well-developed, normocephalic/atraumatic, well-groomed  Eyes: No inflammation or discharge of conjunctiva or lids; sclera clear   Pulmonary/Chest: Effort normal. No respiratory distress.     Ortho Exam  Cervical  ROM: intact but limited in certain range of motion movements.  Neck flexion of about 45 degrees, neck extension of about 40 degrees, lateral flexion bilaterally of about 20 degrees with reported left paracervical pain/tightness during right lateral flexion.  Lateral neck rotation bilaterally of about 45 degrees with reported aggravation of left paracervical neck pain  Midline spinous process tenderness: + C5, C6  Muscular Tenderness: + Left paracervical musculature as well as over the trapezius  Sensation UE Bilateral:  C5: normal  C6: normal  C7:  normal  C8: normal  T1: normal  Strength UE: 5/5 elbow, wrist, fingers bilateral  Spurlings: Negative bilaterally           Procedures

## 2024-02-08 NOTE — PATIENT INSTRUCTIONS
Neck Exercises   AMBULATORY CARE:   Neck exercises  help reduce neck pain, and improve neck movement and strength. Neck exercises also help prevent long-term neck problems.  Call your doctor if:   Your pain does not get better, or gets worse.    You have questions or concerns about your condition, care, or exercise program.    What you need to know about exercise safety:   Move slowly, gently, and smoothly.  Avoid fast or jerky motions.    Stand and sit the way your healthcare provider shows you.  Good posture may reduce your neck pain. Check your posture often, even when you are not doing your neck exercises.    Follow the exercise program recommended by your healthcare provider.  He or she will tell you which exercises are best for your condition. He or she will also tell you how many repetitions to do and how often you should do the exercises.    How to perform neck exercises safely:   Exercise position:  You may sit or stand while you do neck exercises. Face forward. Your shoulders should be straight and relaxed, with a good posture.         Head tilts, forward and back:  Gently bow your head and try to touch your chin to your chest. Your healthcare provider may tell you to push on the back of your neck to help bow your head. Raise your chin back to the starting position. Tilt your head back as far as possible so you are looking up at the ceiling. Your healthcare provider may tell you to lift your chin to help tilt your head back. Return your head to the starting position.         Head tilts, side to side:  Tilt your head, bringing your ear toward your shoulder. Then tilt your head toward the other shoulder.         Head turns:  Turn your head to look over your shoulder. Tilt your chin down and try to touch it to your shoulder. Do not raise your shoulder to your chin. Face forward again. Do the same on the other side.         Head rolls:  Slowly bring your chin toward your chest. Next, roll your head to the  right. Your ear should be positioned over your shoulder. Hold this position for 5 seconds. Roll your head back toward your chest and to the left into the same position. Hold for 5 seconds. Gently roll your head back and around in a clockwise [place] 3 times. Next, move your head in the reverse direction (counterclockwise) in a [place] 3 times. Do not shrug your shoulders upwards while you do this exercise.       Follow up with your doctor as directed:  Write down your questions so you remember to ask them during your visits.  © Copyright [employer] 2023 Information is for End User's use only and may not be sold, redistributed or otherwise used for commercial purposes.  The above information is an  only. It is not intended as medical advice for individual conditions or treatments. Talk to your doctor, nurse or pharmacist before following any medical regimen to see if it is safe and effective for you.

## 2024-02-08 NOTE — TELEPHONE ENCOUNTER
Upon review of the In Basket request we have found as a result of outreach that patient did not have the requested item(s) completed.   This is not a DM Eye Exam - consult for glaucoma - scanned into Media    Any additional questions or concerns should be emailed to the Practice Liaisons via the appropriate education email address, please do not reply via In Basket.    Thank you  Ly Albrecht MA

## 2024-03-13 ENCOUNTER — OFFICE VISIT (OUTPATIENT)
Dept: FAMILY MEDICINE CLINIC | Facility: CLINIC | Age: 84
End: 2024-03-13
Payer: MEDICARE

## 2024-03-13 VITALS
HEIGHT: 62 IN | TEMPERATURE: 97.7 F | WEIGHT: 170.8 LBS | BODY MASS INDEX: 31.43 KG/M2 | OXYGEN SATURATION: 94 % | HEART RATE: 90 BPM | DIASTOLIC BLOOD PRESSURE: 72 MMHG | SYSTOLIC BLOOD PRESSURE: 155 MMHG

## 2024-03-13 DIAGNOSIS — M79.89 LEG SWELLING: Primary | ICD-10-CM

## 2024-03-13 PROCEDURE — G2211 COMPLEX E/M VISIT ADD ON: HCPCS | Performed by: FAMILY MEDICINE

## 2024-03-13 PROCEDURE — 99214 OFFICE O/P EST MOD 30 MIN: CPT | Performed by: FAMILY MEDICINE

## 2024-03-13 RX ORDER — FUROSEMIDE 20 MG/1
20 TABLET ORAL DAILY
Qty: 30 TABLET | Refills: 0 | Status: SHIPPED | OUTPATIENT
Start: 2024-03-13

## 2024-03-13 NOTE — PROGRESS NOTES
"Assessment/Plan:    No problem-specific Assessment & Plan notes found for this encounter.       Diagnoses and all orders for this visit:    Leg swelling  -     furosemide (LASIX) 20 mg tablet; Take 1 tablet (20 mg total) by mouth daily  -     Comprehensive metabolic panel; Future  -     CBC and differential; Future  -     B-Type Natriuretic Peptide(BNP); Future        - Patient complaining of one week history of leg swelling and orthopnea. Patient reports that her weight has been stable; per chart review weight has gone up from 159 to 170lbs since February. At this time will ordered CBC, CMP and BNP to rule out possible CHF and start on lasix 20mg for leg swelling. Follow up in one week.     Subjective:      Patient ID: Mechelle Maldonado is a 83 y.o. female.    HPI  Mechelle Maldonado is a very pleasant 83 year old female with a past medical history of hypertension, type 2 diabetes mellitus and asthma who presents today with a chief complaint of leg swelling. Patient states that she went to see her dermatologist earlier today for a rash on her legs which started a few days ago and they suggested that she may need a \"water pill.\" Patient states she noticed the leg swelling a week ago and states that she normally does not experience this. She states that she is experiencing some shortness of breath when lying down flat and has been sleeping in her recliner recently and using a lot more pillows although sleeping in the recliner also helps with her arthritis as well. She reports that her weight has been the same.     The following portions of the patient's history were reviewed and updated as appropriate: allergies, current medications, past family history, past medical history, past social history, past surgical history, and problem list.    Review of Systems   Constitutional: Negative.    HENT: Negative.     Eyes: Negative.    Respiratory:  Positive for shortness of breath.    Cardiovascular:  Positive for leg swelling. Negative for " "chest pain.   Gastrointestinal: Negative.    Genitourinary: Negative.    Musculoskeletal: Negative.    Skin:  Positive for rash.   Neurological: Negative.    Psychiatric/Behavioral: Negative.         Objective:      /72 (BP Location: Left arm, Patient Position: Sitting, Cuff Size: Adult)   Pulse 90   Temp 97.7 °F (36.5 °C) (Temporal)   Ht 5' 2\" (1.575 m)   Wt 77.5 kg (170 lb 12.8 oz)   SpO2 94%   BMI 31.24 kg/m²          Physical Exam  Constitutional:       General: She is not in acute distress.     Appearance: She is not ill-appearing.   HENT:      Head: Normocephalic and atraumatic.   Eyes:      General:         Right eye: No discharge.         Left eye: No discharge.      Extraocular Movements: Extraocular movements intact.   Cardiovascular:      Rate and Rhythm: Normal rate.      Heart sounds: Murmur heard.   Pulmonary:      Effort: Pulmonary effort is normal. No respiratory distress.      Breath sounds: No rales.   Abdominal:      Palpations: Abdomen is soft.      Tenderness: There is no abdominal tenderness.   Musculoskeletal:      Comments: +1 pitting edema bilaterally   Skin:     Findings: Rash present.   Neurological:      General: No focal deficit present.      Mental Status: She is alert.   Psychiatric:         Mood and Affect: Mood normal.         Behavior: Behavior normal.           "

## 2024-03-14 ENCOUNTER — LAB (OUTPATIENT)
Age: 84
End: 2024-03-14
Payer: COMMERCIAL

## 2024-03-14 DIAGNOSIS — M79.89 LEG SWELLING: ICD-10-CM

## 2024-03-14 LAB
ALBUMIN SERPL BCP-MCNC: 4 G/DL (ref 3.5–5)
ALP SERPL-CCNC: 68 U/L (ref 34–104)
ALT SERPL W P-5'-P-CCNC: 12 U/L (ref 7–52)
ANION GAP SERPL CALCULATED.3IONS-SCNC: 9 MMOL/L (ref 4–13)
AST SERPL W P-5'-P-CCNC: 20 U/L (ref 13–39)
BASOPHILS # BLD AUTO: 0.04 THOUSANDS/ÂΜL (ref 0–0.1)
BASOPHILS NFR BLD AUTO: 1 % (ref 0–1)
BILIRUB SERPL-MCNC: 0.41 MG/DL (ref 0.2–1)
BNP SERPL-MCNC: 256 PG/ML (ref 0–100)
BUN SERPL-MCNC: 12 MG/DL (ref 5–25)
CALCIUM SERPL-MCNC: 10.2 MG/DL (ref 8.4–10.2)
CHLORIDE SERPL-SCNC: 101 MMOL/L (ref 96–108)
CO2 SERPL-SCNC: 28 MMOL/L (ref 21–32)
CREAT SERPL-MCNC: 0.64 MG/DL (ref 0.6–1.3)
EOSINOPHIL # BLD AUTO: 0.15 THOUSAND/ÂΜL (ref 0–0.61)
EOSINOPHIL NFR BLD AUTO: 3 % (ref 0–6)
ERYTHROCYTE [DISTWIDTH] IN BLOOD BY AUTOMATED COUNT: 13.6 % (ref 11.6–15.1)
GFR SERPL CREATININE-BSD FRML MDRD: 83 ML/MIN/1.73SQ M
GLUCOSE P FAST SERPL-MCNC: 112 MG/DL (ref 65–99)
HCT VFR BLD AUTO: 38.5 % (ref 34.8–46.1)
HGB BLD-MCNC: 11.8 G/DL (ref 11.5–15.4)
IMM GRANULOCYTES # BLD AUTO: 0.02 THOUSAND/UL (ref 0–0.2)
IMM GRANULOCYTES NFR BLD AUTO: 0 % (ref 0–2)
LYMPHOCYTES # BLD AUTO: 1.49 THOUSANDS/ÂΜL (ref 0.6–4.47)
LYMPHOCYTES NFR BLD AUTO: 26 % (ref 14–44)
MCH RBC QN AUTO: 29.8 PG (ref 26.8–34.3)
MCHC RBC AUTO-ENTMCNC: 30.6 G/DL (ref 31.4–37.4)
MCV RBC AUTO: 97 FL (ref 82–98)
MONOCYTES # BLD AUTO: 0.7 THOUSAND/ÂΜL (ref 0.17–1.22)
MONOCYTES NFR BLD AUTO: 12 % (ref 4–12)
NEUTROPHILS # BLD AUTO: 3.36 THOUSANDS/ÂΜL (ref 1.85–7.62)
NEUTS SEG NFR BLD AUTO: 58 % (ref 43–75)
NRBC BLD AUTO-RTO: 0 /100 WBCS
PLATELET # BLD AUTO: 376 THOUSANDS/UL (ref 149–390)
PMV BLD AUTO: 10.6 FL (ref 8.9–12.7)
POTASSIUM SERPL-SCNC: 4.5 MMOL/L (ref 3.5–5.3)
PROT SERPL-MCNC: 7 G/DL (ref 6.4–8.4)
RBC # BLD AUTO: 3.96 MILLION/UL (ref 3.81–5.12)
SODIUM SERPL-SCNC: 138 MMOL/L (ref 135–147)
WBC # BLD AUTO: 5.76 THOUSAND/UL (ref 4.31–10.16)

## 2024-03-14 PROCEDURE — 83880 ASSAY OF NATRIURETIC PEPTIDE: CPT

## 2024-03-14 PROCEDURE — 80053 COMPREHEN METABOLIC PANEL: CPT

## 2024-03-14 PROCEDURE — 85025 COMPLETE CBC W/AUTO DIFF WBC: CPT

## 2024-03-14 PROCEDURE — 36415 COLL VENOUS BLD VENIPUNCTURE: CPT

## 2024-03-18 DIAGNOSIS — M79.89 LEG SWELLING: Primary | ICD-10-CM

## 2024-03-18 DIAGNOSIS — R79.89 ELEVATED BRAIN NATRIURETIC PEPTIDE (BNP) LEVEL: ICD-10-CM

## 2024-03-19 ENCOUNTER — OFFICE VISIT (OUTPATIENT)
Dept: FAMILY MEDICINE CLINIC | Facility: CLINIC | Age: 84
End: 2024-03-19
Payer: MEDICARE

## 2024-03-19 ENCOUNTER — TELEPHONE (OUTPATIENT)
Age: 84
End: 2024-03-19

## 2024-03-19 VITALS
SYSTOLIC BLOOD PRESSURE: 130 MMHG | BODY MASS INDEX: 30.84 KG/M2 | HEART RATE: 83 BPM | TEMPERATURE: 97.8 F | WEIGHT: 167.6 LBS | OXYGEN SATURATION: 97 % | DIASTOLIC BLOOD PRESSURE: 78 MMHG | HEIGHT: 62 IN

## 2024-03-19 DIAGNOSIS — E11.9 TYPE 2 DIABETES MELLITUS WITHOUT COMPLICATION, WITHOUT LONG-TERM CURRENT USE OF INSULIN (HCC): ICD-10-CM

## 2024-03-19 DIAGNOSIS — M79.89 LEG SWELLING: Primary | ICD-10-CM

## 2024-03-19 LAB — SL AMB POCT HEMOGLOBIN AIC: 6.3 (ref ?–6.5)

## 2024-03-19 PROCEDURE — 99214 OFFICE O/P EST MOD 30 MIN: CPT | Performed by: FAMILY MEDICINE

## 2024-03-19 PROCEDURE — 83036 HEMOGLOBIN GLYCOSYLATED A1C: CPT | Performed by: FAMILY MEDICINE

## 2024-03-19 RX ORDER — BETAMETHASONE DIPROPIONATE 0.5 MG/G
CREAM TOPICAL
COMMUNITY
Start: 2024-03-13

## 2024-03-19 NOTE — TELEPHONE ENCOUNTER
Patient called and wanted to see Dr. Newton could manage to get  her in for her echo sooner then her schedule time on 5.30.24.  Please call patient back and advise

## 2024-03-20 NOTE — TELEPHONE ENCOUNTER
Please let me know your thoughts and I can call and make changes but the order would need to be change to stat

## 2024-03-22 ENCOUNTER — HOSPITAL ENCOUNTER (OUTPATIENT)
Dept: NON INVASIVE DIAGNOSTICS | Facility: HOSPITAL | Age: 84
Discharge: HOME | End: 2024-03-22
Payer: COMMERCIAL

## 2024-03-22 VITALS
SYSTOLIC BLOOD PRESSURE: 130 MMHG | HEIGHT: 62 IN | HEART RATE: 83 BPM | WEIGHT: 167.55 LBS | DIASTOLIC BLOOD PRESSURE: 78 MMHG | BODY MASS INDEX: 30.83 KG/M2

## 2024-03-22 DIAGNOSIS — M79.89 LEG SWELLING: ICD-10-CM

## 2024-03-22 DIAGNOSIS — M79.89 LEG SWELLING: Primary | ICD-10-CM

## 2024-03-22 DIAGNOSIS — R79.89 ELEVATED BRAIN NATRIURETIC PEPTIDE (BNP) LEVEL: ICD-10-CM

## 2024-03-22 LAB
AORTIC ROOT: 3.6 CM
AORTIC VALVE MEAN VELOCITY: 13 M/S
APICAL FOUR CHAMBER EJECTION FRACTION: 49 %
ASCENDING AORTA: 3.6 CM
AV AREA BY CONTINUOUS VTI: 1.9 CM2
AV AREA PEAK VELOCITY: 1.8 CM2
AV LVOT MEAN GRADIENT: 2 MMHG
AV LVOT PEAK GRADIENT: 4 MMHG
AV MEAN GRADIENT: 8 MMHG
AV PEAK GRADIENT: 14 MMHG
AV VALVE AREA: 1.88 CM2
AV VELOCITY RATIO: 0.56
BSA FOR ECHO PROCEDURE: 1.77 M2
DOP CALC AO PEAK VEL: 1.83 M/S
DOP CALC AO VTI: 35.96 CM
DOP CALC LVOT AREA: 3.14 CM2
DOP CALC LVOT CARDIAC INDEX: 2.49 L/MIN/M2
DOP CALC LVOT CARDIAC OUTPUT: 4.42 L/MIN
DOP CALC LVOT DIAMETER: 2 CM
DOP CALC LVOT PEAK VEL VTI: 21.54 CM
DOP CALC LVOT PEAK VEL: 1.03 M/S
DOP CALC LVOT STROKE INDEX: 39.5 ML/M2
DOP CALC LVOT STROKE VOLUME: 67.64
DOP CALC MV VTI: 47.58 CM
E WAVE DECELERATION TIME: 280 MS
E/A RATIO: 0.42
FRACTIONAL SHORTENING: 26 (ref 28–44)
INTERVENTRICULAR SEPTUM IN DIASTOLE (PARASTERNAL SHORT AXIS VIEW): 1.7 CM
INTERVENTRICULAR SEPTUM: 1.7 CM (ref 0.6–1.1)
LAAS-AP2: 18.9 CM2
LAAS-AP4: 22.6 CM2
LEFT ATRIUM SIZE: 3 CM
LEFT ATRIUM VOLUME (MOD BIPLANE): 58 ML
LEFT ATRIUM VOLUME INDEX (MOD BIPLANE): 32.8 ML/M2
LEFT INTERNAL DIMENSION IN SYSTOLE: 3.2 CM (ref 2.1–4)
LEFT VENTRICULAR INTERNAL DIMENSION IN DIASTOLE: 4.3 CM (ref 3.5–6)
LEFT VENTRICULAR POSTERIOR WALL IN END DIASTOLE: 1.7 CM
LEFT VENTRICULAR STROKE VOLUME: 42 ML
LVSV (TEICH): 42 ML
MITRAL REGURGITATION PEAK VELOCITY: 5.58 M/S
MITRAL VALVE MEAN INFLOW VELOCITY: 4.25 M/S
MITRAL VALVE REGURGITANT PEAK GRADIENT: 125 MMHG
MV E'TISSUE VEL-SEP: 4 CM/S
MV MEAN GRADIENT: 3 MMHG
MV PEAK A VEL: 1.25 M/S
MV PEAK E VEL: 52 CM/S
MV PEAK GRADIENT: 11 MMHG
MV STENOSIS PRESSURE HALF TIME: 81 MS
MV VALVE AREA BY CONTINUITY EQUATION: 1.42 CM2
MV VALVE AREA P 1/2 METHOD: 2.72
RA PRESSURE ESTIMATED: 3 MMHG
RIGHT ATRIUM AREA SYSTOLE A4C: 15.6 CM2
RIGHT VENTRICLE ID DIMENSION: 3.2 CM
RV PSP: 8 MMHG
SL CV DOP CALC MV VTI RETROGRADE: 198.8 CM
SL CV LEFT ATRIUM LENGTH A2C: 5.8 CM
SL CV LV EF: 45
SL CV MV MEAN GRADIENT RETROGRADE: 82 MMHG
SL CV PED ECHO LEFT VENTRICLE DIASTOLIC VOLUME (MOD BIPLANE) 2D: 84 ML
SL CV PED ECHO LEFT VENTRICLE SYSTOLIC VOLUME (MOD BIPLANE) 2D: 42 ML
TR MAX PG: 5 MMHG
TR PEAK VELOCITY: 1.1 M/S
TRICUSPID ANNULAR PLANE SYSTOLIC EXCURSION: 2.9 CM
TRICUSPID VALVE PEAK REGURGITATION VELOCITY: 1.12 M/S

## 2024-03-22 PROCEDURE — 93306 TTE W/DOPPLER COMPLETE: CPT

## 2024-03-22 NOTE — TELEPHONE ENCOUNTER
Called scheduling and they were able to get patient in today at 11am. I then called patient and advised.

## 2024-03-25 ENCOUNTER — TELEPHONE (OUTPATIENT)
Dept: FAMILY MEDICINE CLINIC | Facility: CLINIC | Age: 84
End: 2024-03-25

## 2024-03-25 DIAGNOSIS — M79.89 LEG SWELLING: ICD-10-CM

## 2024-03-25 RX ORDER — FUROSEMIDE 40 MG/1
40 TABLET ORAL DAILY
Qty: 30 TABLET | Refills: 1 | Status: SHIPPED | OUTPATIENT
Start: 2024-03-25

## 2024-03-25 NOTE — TELEPHONE ENCOUNTER
Called and spoke with patient regarding echocardiogram results; patient still complaining of lower extremity swelling. Will increase lasix to 40mg. Patient advised to get repeat BMP to monitor kidney function and electrolytes. Will ask clinical staff to schedule 4 week follow up for patient.

## 2024-03-25 NOTE — PROGRESS NOTES
Assessment/Plan:    No problem-specific Assessment & Plan notes found for this encounter.       Diagnoses and all orders for this visit:    Leg swelling  -     Basic metabolic panel; Future    Type 2 diabetes mellitus without complication, without long-term current use of insulin ([facility])  -     POCT hemoglobin A1c    Other orders  -     betamethasone, augmented, (DIPROLENE-AF) 0.05 % cream        - Continue lasix 20 mg daily for leg swelling; as BNP was mildly elevated on previous lab work an echocardiogram has been ordered and is scheduled for 4/9/24. Recommend daily weights and limit salt intake. Recheck BMP to assess kidney function   - A1C 6.3 which is at goal; continue current regimen of metformin 500 mg daily     Subjective:      Patient ID: Mechelle Maldonado is a 83 y.o. female.    HPI  Mechelle Maldonado is a very pleasant 83 year old female with a past medical history of type 2 diabetes mellitus and hypertension who presents today accompanied by her  for a follow up of her leg swelling. At previous office visit patient was started on lasix for lower extremity swelling. At that time patient was also complaining of some orthopnea as well and a BNP was subsequently ordered which was mildly elevated. Today patient reports that the leg swelling has improved. She continues to follow with dermatology for the rash on her legs.     The following portions of the patient's history were reviewed and updated as appropriate: allergies, current medications, past family history, past medical history, past social history, past surgical history, and problem list.    Review of Systems   Constitutional: Negative.    HENT: Negative.     Eyes: Negative.    Respiratory: Negative.     Cardiovascular:  Positive for leg swelling.   Gastrointestinal: Negative.    Genitourinary: Negative.    Musculoskeletal: Negative.    Skin:  Positive for rash.   Neurological: Negative.    Psychiatric/Behavioral: Negative.           Objective:      /78  "(BP Location: Left arm, Patient Position: Sitting, Cuff Size: Standard)   Pulse 83   Temp 97.8 °F (36.6 °C) (Temporal)   Ht 5' 2\" (1.575 m)   Wt 76 kg (167 lb 9.6 oz)   SpO2 97%   BMI 30.65 kg/m²          Physical Exam  Constitutional:       General: She is not in acute distress.     Appearance: She is not ill-appearing.   HENT:      Head: Normocephalic and atraumatic.   Eyes:      General:         Right eye: No discharge.         Left eye: No discharge.      Extraocular Movements: Extraocular movements intact.   Cardiovascular:      Rate and Rhythm: Normal rate.      Heart sounds: Murmur heard.   Pulmonary:      Effort: Pulmonary effort is normal. No respiratory distress.      Breath sounds: No wheezing.   Musculoskeletal:      Right lower leg: Edema present.      Left lower leg: Edema present.   Neurological:      General: No focal deficit present.      Mental Status: She is alert.   Psychiatric:         Mood and Affect: Mood normal.         Behavior: Behavior normal.           "

## 2024-04-15 ENCOUNTER — TELEPHONE (OUTPATIENT)
Age: 84
End: 2024-04-15

## 2024-04-15 NOTE — TELEPHONE ENCOUNTER
Judy from [facility] called asking that she needs this pt to be medically cleared to wear compression stockings. If Dr. Bell can write a letter and fax it to [phone] or he can call the office at [phone] thank you.

## 2024-04-19 ENCOUNTER — RA CDI HCC (OUTPATIENT)
Dept: OTHER | Facility: HOSPITAL | Age: 84
End: 2024-04-19

## 2024-04-23 ENCOUNTER — OFFICE VISIT (OUTPATIENT)
Dept: FAMILY MEDICINE CLINIC | Facility: CLINIC | Age: 84
End: 2024-04-23
Payer: COMMERCIAL

## 2024-04-23 VITALS
OXYGEN SATURATION: 97 % | TEMPERATURE: 97.8 F | WEIGHT: 162.8 LBS | HEIGHT: 62 IN | HEART RATE: 68 BPM | BODY MASS INDEX: 29.96 KG/M2 | DIASTOLIC BLOOD PRESSURE: 76 MMHG | SYSTOLIC BLOOD PRESSURE: 120 MMHG

## 2024-04-23 DIAGNOSIS — I51.89 GRADE II DIASTOLIC DYSFUNCTION: ICD-10-CM

## 2024-04-23 DIAGNOSIS — J45.21 MILD INTERMITTENT ASTHMA WITH ACUTE EXACERBATION: ICD-10-CM

## 2024-04-23 DIAGNOSIS — M79.89 LEG SWELLING: ICD-10-CM

## 2024-04-23 DIAGNOSIS — B34.9 VIRAL SYNDROME: Primary | ICD-10-CM

## 2024-04-23 PROCEDURE — 87804 INFLUENZA ASSAY W/OPTIC: CPT | Performed by: FAMILY MEDICINE

## 2024-04-23 PROCEDURE — G2211 COMPLEX E/M VISIT ADD ON: HCPCS | Performed by: FAMILY MEDICINE

## 2024-04-23 PROCEDURE — 99214 OFFICE O/P EST MOD 30 MIN: CPT | Performed by: FAMILY MEDICINE

## 2024-04-23 PROCEDURE — 87811 SARS-COV-2 COVID19 W/OPTIC: CPT | Performed by: FAMILY MEDICINE

## 2024-04-23 RX ORDER — BENZONATATE 200 MG/1
200 CAPSULE ORAL 3 TIMES DAILY PRN
Qty: 30 CAPSULE | Refills: 0 | Status: SHIPPED | OUTPATIENT
Start: 2024-04-23

## 2024-04-23 RX ORDER — ALBUTEROL SULFATE 90 UG/1
2 AEROSOL, METERED RESPIRATORY (INHALATION) EVERY 4 HOURS
Qty: 54 G | Refills: 3 | Status: SHIPPED | OUTPATIENT
Start: 2024-04-23

## 2024-04-23 RX ORDER — PREDNISONE 20 MG/1
40 TABLET ORAL DAILY
Qty: 10 TABLET | Refills: 0 | Status: SHIPPED | OUTPATIENT
Start: 2024-04-23 | End: 2024-04-28

## 2024-04-23 NOTE — PROGRESS NOTES
Assessment/Plan:    No problem-specific Assessment & Plan notes found for this encounter.       Diagnoses and all orders for this visit:    Viral syndrome  -     POCT Rapid Covid Ag  -     POCT rapid flu A and B  -     benzonatate (TESSALON) 200 MG capsule; Take 1 capsule (200 mg total) by mouth 3 (three) times a day as needed for cough    Mild intermittent asthma with acute exacerbation  -     albuterol (PROVENTIL HFA,VENTOLIN HFA) 90 mcg/act inhaler; Inhale 2 puffs every 4 (four) hours  -     predniSONE 20 mg tablet; Take 2 tablets (40 mg total) by mouth daily for 5 days    Leg swelling  -     furosemide (LASIX) 40 mg tablet; Take 1 tablet (40 mg total) by mouth daily    Grade II diastolic dysfunction  -     furosemide (LASIX) 40 mg tablet; Take 1 tablet (40 mg total) by mouth daily        - POCT covid and flu testing negative. Recommend supportive care at this time with plenty of over the counter medication, hydration, honey for cough, tessalon perles which were prescribed, flonase nasal spray for congestion etc. Recommend also adhering to a bland diet as well given GI symptoms.   - Start short course of prednisone for mild asthma exacerbation; continue albuterol as needed  - Continue lasix 40mg daily; patient advised to get BMP previously ordered to assess electrolytes and kidney function.    Subjective:      Patient ID: Mechelle Maldonado is a 83 y.o. female.    HPI  Mechelle Maldonado is a very pleasant 83 year old female who presents today for a follow up. Patient states that the lower extremity swelling has improved with the addition of lasix and she is tolerating the medication well without any side effects. 3 days ago she did however develop URI symptoms with cough, congestion and diarrhea. She has been taking over the counter medication for her symptoms but has been needing to use her albuterol inhaler more frequently since this began.     The following portions of the patient's history were reviewed and updated as  "appropriate: allergies, current medications, past family history, past medical history, past social history, past surgical history, and problem list.    Review of Systems   Constitutional: Negative.  Negative for chills and fever.   HENT:  Positive for congestion.    Eyes: Negative.    Respiratory:  Positive for cough. Negative for shortness of breath.    Cardiovascular: Negative.  Negative for chest pain.   Gastrointestinal:  Positive for diarrhea.   Genitourinary: Negative.    Musculoskeletal: Negative.    Skin: Negative.    Neurological: Negative.    Psychiatric/Behavioral: Negative.           Objective:      /76 (BP Location: Left arm, Patient Position: Sitting, Cuff Size: Adult)   Pulse 68   Temp 97.8 °F (36.6 °C) (Temporal)   Ht 5' 2\" (1.575 m)   Wt 73.8 kg (162 lb 12.8 oz)   SpO2 97%   BMI 29.78 kg/m²          Physical Exam  Constitutional:       General: She is not in acute distress.     Appearance: She is not ill-appearing.   HENT:      Head: Normocephalic and atraumatic.   Eyes:      General:         Right eye: No discharge.         Left eye: No discharge.      Extraocular Movements: Extraocular movements intact.   Cardiovascular:      Rate and Rhythm: Normal rate.   Pulmonary:      Effort: Pulmonary effort is normal. No respiratory distress.      Breath sounds: Wheezing present.      Comments: Mild wheezing  Abdominal:      General: Bowel sounds are normal. There is no distension.      Palpations: Abdomen is soft.      Tenderness: There is no abdominal tenderness.   Musculoskeletal:      Comments: +1 edema noted bilaterally   Neurological:      General: No focal deficit present.      Mental Status: She is alert.   Psychiatric:         Mood and Affect: Mood normal.         Behavior: Behavior normal.           "

## 2024-04-24 ENCOUNTER — TELEPHONE (OUTPATIENT)
Age: 84
End: 2024-04-24

## 2024-04-24 ENCOUNTER — APPOINTMENT (OUTPATIENT)
Age: 84
End: 2024-04-24
Payer: COMMERCIAL

## 2024-04-24 DIAGNOSIS — M79.89 LEG SWELLING: ICD-10-CM

## 2024-04-24 LAB
ANION GAP SERPL CALCULATED.3IONS-SCNC: 8 MMOL/L (ref 4–13)
BUN SERPL-MCNC: 11 MG/DL (ref 5–25)
CALCIUM SERPL-MCNC: 9.9 MG/DL (ref 8.4–10.2)
CHLORIDE SERPL-SCNC: 97 MMOL/L (ref 96–108)
CO2 SERPL-SCNC: 29 MMOL/L (ref 21–32)
CREAT SERPL-MCNC: 0.6 MG/DL (ref 0.6–1.3)
GFR SERPL CREATININE-BSD FRML MDRD: 85 ML/MIN/1.73SQ M
GLUCOSE SERPL-MCNC: 129 MG/DL (ref 65–140)
POTASSIUM SERPL-SCNC: 4.1 MMOL/L (ref 3.5–5.3)
SARS-COV-2 AG UPPER RESP QL IA: NEGATIVE
SL AMB POCT RAPID FLU A: NEGATIVE
SL AMB POCT RAPID FLU B: NEGATIVE
SODIUM SERPL-SCNC: 134 MMOL/L (ref 135–147)
VALID CONTROL: NORMAL

## 2024-04-24 PROCEDURE — 80048 BASIC METABOLIC PNL TOTAL CA: CPT

## 2024-04-24 PROCEDURE — 36415 COLL VENOUS BLD VENIPUNCTURE: CPT

## 2024-04-24 RX ORDER — FUROSEMIDE 40 MG/1
40 TABLET ORAL DAILY
Qty: 30 TABLET | Refills: 1 | Status: SHIPPED | OUTPATIENT
Start: 2024-04-24

## 2024-04-24 NOTE — TELEPHONE ENCOUNTER
Patient calling in to verify that PCP wanted her to have blood work done after the OV yesterday. Verified order was placed for blood work, patient states she will have it drawn at [facility].

## 2024-04-26 ENCOUNTER — TELEPHONE (OUTPATIENT)
Age: 84
End: 2024-04-26

## 2024-04-26 NOTE — TELEPHONE ENCOUNTER
Received call from patient to update Dr. Newton on her symptoms since OV on 4/23. She states that she feels like her cough is improving however she is still having coughing fits and does not think that the Tessalon is helping. She is also taking Tussin cough syrup which helps temporarily but does not last very long.  Reports wet, productive cough with small amount of clear sputum. Denies fever.  She is using her inhalers and reports mild MARIE.    Also states that she is still having diarrhea as well but feels like this is getting better. Reports 3-4 episodes per day and has been taking generic imodium twice daily. Instructed in adequate fluid intake- 8-10 glasses of water per day to help prevent dehydration.    She is asking if Dr Newton can prescribed something stronger for her cough.    Please advise.

## 2024-04-29 ENCOUNTER — TELEPHONE (OUTPATIENT)
Age: 84
End: 2024-04-29

## 2024-04-29 ENCOUNTER — TELEPHONE (OUTPATIENT)
Dept: FAMILY MEDICINE CLINIC | Facility: CLINIC | Age: 84
End: 2024-04-29

## 2024-04-29 DIAGNOSIS — R05.1 ACUTE COUGH: Primary | ICD-10-CM

## 2024-04-29 RX ORDER — CODEINE PHOSPHATE/GUAIFENESIN 10-100MG/5
5 LIQUID (ML) ORAL 3 TIMES DAILY PRN
Qty: 118 ML | Refills: 0 | Status: SHIPPED | OUTPATIENT
Start: 2024-04-29 | End: 2024-05-06

## 2024-04-29 NOTE — TELEPHONE ENCOUNTER
I sent guaifenesin-codeine for the cough. The codeine is a controlled substance and it may make her drowsy but it should help her get some rest in addition to being a cough suppressant. Any worsening of symptoms please have her be seen in the office

## 2024-04-29 NOTE — TELEPHONE ENCOUNTER
Patient states her tessalon perles are not working ans if asking for a new med to be sent to the pharmacy for her.

## 2024-04-29 NOTE — TELEPHONE ENCOUNTER
----- Message from Lisa eNwton MD sent at 4/25/2024 12:56 PM EDT -----  Sodium minimally low but otherwise potassium levels normal and kidney function stable at this time. How is patient feeling?

## 2024-05-03 ENCOUNTER — APPOINTMENT (OUTPATIENT)
Dept: RADIOLOGY | Facility: MEDICAL CENTER | Age: 84
End: 2024-05-03
Payer: COMMERCIAL

## 2024-05-03 DIAGNOSIS — R05.1 ACUTE COUGH: Primary | ICD-10-CM

## 2024-05-03 DIAGNOSIS — R05.1 ACUTE COUGH: ICD-10-CM

## 2024-05-03 PROCEDURE — 71046 X-RAY EXAM CHEST 2 VIEWS: CPT

## 2024-05-03 RX ORDER — AZITHROMYCIN 250 MG/1
TABLET, FILM COATED ORAL
Qty: 6 TABLET | Refills: 0 | Status: SHIPPED | OUTPATIENT
Start: 2024-05-03 | End: 2024-05-07

## 2024-05-06 DIAGNOSIS — R05.1 ACUTE COUGH: ICD-10-CM

## 2024-05-06 RX ORDER — CODEINE PHOSPHATE AND GUAIFENESIN 10; 100 MG/5ML; MG/5ML
SOLUTION ORAL
Qty: 118 ML | Refills: 0 | Status: SHIPPED | OUTPATIENT
Start: 2024-05-06

## 2024-05-06 RX ORDER — CODEINE PHOSPHATE AND GUAIFENESIN 10; 100 MG/5ML; MG/5ML
SOLUTION ORAL
Qty: 118 ML | Refills: 0 | OUTPATIENT
Start: 2024-05-06

## 2024-05-07 ENCOUNTER — TELEPHONE (OUTPATIENT)
Dept: FAMILY MEDICINE CLINIC | Facility: CLINIC | Age: 84
End: 2024-05-07

## 2024-05-07 NOTE — TELEPHONE ENCOUNTER
----- Message from Lisa Newton MD sent at 5/7/2024  7:45 AM EDT -----  CXR negative, how is patient doing?

## 2024-06-13 ENCOUNTER — TELEPHONE (OUTPATIENT)
Age: 84
End: 2024-06-13

## 2024-06-13 DIAGNOSIS — Z12.31 ENCOUNTER FOR SCREENING MAMMOGRAM FOR MALIGNANT NEOPLASM OF BREAST: Primary | ICD-10-CM

## 2024-06-13 NOTE — TELEPHONE ENCOUNTER
Pt called stated she received her letter from [facility] about scheduling her mammogram.    Pt is requesting her referral and kindly let her know when she can central scheduling to schedule.    [phone]

## 2024-06-24 ENCOUNTER — HOSPITAL ENCOUNTER (OUTPATIENT)
Dept: MAMMOGRAPHY | Facility: MEDICAL CENTER | Age: 84
Discharge: HOME | End: 2024-06-24
Payer: COMMERCIAL

## 2024-06-24 VITALS — WEIGHT: 162.7 LBS | HEIGHT: 62 IN | BODY MASS INDEX: 29.94 KG/M2

## 2024-06-24 DIAGNOSIS — Z12.31 ENCOUNTER FOR SCREENING MAMMOGRAM FOR MALIGNANT NEOPLASM OF BREAST: ICD-10-CM

## 2024-06-24 PROCEDURE — 77063 BREAST TOMOSYNTHESIS BI: CPT

## 2024-06-24 PROCEDURE — 77067 SCR MAMMO BI INCL CAD: CPT

## 2024-08-05 ENCOUNTER — TELEPHONE (OUTPATIENT)
Age: 84
End: 2024-08-05

## 2024-08-05 NOTE — TELEPHONE ENCOUNTER
Pt called in stating she wants to get her labs done before her visit. Please do issue and call back to update on the same as she is not active on MyChart. Thanks

## 2024-08-05 NOTE — TELEPHONE ENCOUNTER
Pt was calling to check status of lab scripts.  She would like to get blood work done this week specifically Wednesday so everything is done prior to her appt.    Please advise pt if labs are needed and if script is in system.

## 2024-08-12 ENCOUNTER — OFFICE VISIT (OUTPATIENT)
Dept: FAMILY MEDICINE CLINIC | Facility: CLINIC | Age: 84
End: 2024-08-12
Payer: COMMERCIAL

## 2024-08-12 VITALS
BODY MASS INDEX: 29.63 KG/M2 | SYSTOLIC BLOOD PRESSURE: 138 MMHG | TEMPERATURE: 97.6 F | HEIGHT: 62 IN | DIASTOLIC BLOOD PRESSURE: 72 MMHG | HEART RATE: 72 BPM | OXYGEN SATURATION: 95 % | WEIGHT: 161 LBS

## 2024-08-12 DIAGNOSIS — I50.42 CHRONIC COMBINED SYSTOLIC AND DIASTOLIC CONGESTIVE HEART FAILURE (HCC): Primary | ICD-10-CM

## 2024-08-12 DIAGNOSIS — E03.9 ACQUIRED HYPOTHYROIDISM: ICD-10-CM

## 2024-08-12 DIAGNOSIS — E78.2 MIXED HYPERLIPIDEMIA: ICD-10-CM

## 2024-08-12 DIAGNOSIS — E11.9 TYPE 2 DIABETES MELLITUS WITHOUT COMPLICATION, WITHOUT LONG-TERM CURRENT USE OF INSULIN (HCC): ICD-10-CM

## 2024-08-12 DIAGNOSIS — M81.6 LOCALIZED OSTEOPOROSIS WITHOUT CURRENT PATHOLOGICAL FRACTURE: ICD-10-CM

## 2024-08-12 DIAGNOSIS — I10 BENIGN ESSENTIAL HYPERTENSION: ICD-10-CM

## 2024-08-12 LAB — SL AMB POCT HEMOGLOBIN AIC: 6 (ref ?–6.5)

## 2024-08-12 PROCEDURE — 99214 OFFICE O/P EST MOD 30 MIN: CPT | Performed by: FAMILY MEDICINE

## 2024-08-12 PROCEDURE — 83036 HEMOGLOBIN GLYCOSYLATED A1C: CPT | Performed by: FAMILY MEDICINE

## 2024-08-12 RX ORDER — LEVOTHYROXINE SODIUM 88 UG/1
88 TABLET ORAL DAILY
Qty: 90 TABLET | Refills: 3 | Status: SHIPPED | OUTPATIENT
Start: 2024-08-12 | End: 2027-09-12

## 2024-08-12 RX ORDER — TORSEMIDE 10 MG/1
TABLET ORAL
Qty: 180 TABLET | Refills: 3 | Status: SHIPPED | OUTPATIENT
Start: 2024-08-12

## 2024-08-12 RX ORDER — SIMVASTATIN 10 MG
10 TABLET ORAL DAILY
Qty: 90 TABLET | Refills: 3 | Status: SHIPPED | OUTPATIENT
Start: 2024-08-12 | End: 2027-10-28

## 2024-08-12 RX ORDER — METFORMIN HCL 500 MG
500 TABLET, EXTENDED RELEASE 24 HR ORAL DAILY
Qty: 90 TABLET | Refills: 3 | Status: SHIPPED | OUTPATIENT
Start: 2024-08-12

## 2024-08-12 RX ORDER — AMLODIPINE BESYLATE 10 MG/1
10 TABLET ORAL DAILY
Qty: 90 TABLET | Refills: 3 | Status: SHIPPED | OUTPATIENT
Start: 2024-08-12 | End: 2025-09-18

## 2024-08-12 NOTE — PATIENT INSTRUCTIONS
Diabetes well-controlled with A1c of 6.0.  There is some swelling in the legs consistent with heart failure.  Torsemide 10 mg can be 1 or 2 tablets daily.  Not necessary to take if there is no swelling there but probably should take 10 mg every day for the most part.  Other medicines stay the same.  Recheck in 2 months.

## 2024-08-12 NOTE — PROGRESS NOTES
Chief Complaint   Patient presents with    Follow-up     6 month follow up, would like labs ordered        HPI   Here for follow-up of hypertension, diabetes, hyperlipidemia, osteoporosis, and hypothyroidism  Doing well.  Back was in spasm for a couple days and responded to Voltaren and Parafon.  Since last visit with me, had an echocardiogram because of some leg edema.  Ejection fraction 45% with moderate concentric hypertrophy.  Was given furosemide 20 mg which was increased to 40 mg which caused her to pee all the time.  Not taking anything now and doing okay.    Past Medical History:   Diagnosis Date    Acquired hypothyroidism 01/07/2010    Anxiety state 07/29/2010    Chronic combined systolic and diastolic congestive heart failure (HCC) 08/12/2024    Chronic low back pain 06/03/2015    Eczema 03/11/2014    Hearing loss 01/14/2014    Hyperlipidemia 06/03/2015    Hypertension     Iron deficiency anemia 04/06/2010    Mild persistent asthma without complication 04/06/2010    Non morbid obesity due to excess calories 09/06/2017    Osteoarthritis 11/09/2015    Osteoporosis     Rectocele     grade 2    Skin cancer     treating with dermatologist    Type 2 diabetes mellitus without complication, without long-term current use of insulin ([facility]) 12/02/2018    Vitamin D deficiency 07/17/2013        Past Surgical History:   Procedure Laterality Date    APPENDECTOMY      CATARACT EXTRACTION Bilateral     and lens implants    CHOLECYSTECTOMY      HYSTERECTOMY      age 38    KNEE SURGERY Bilateral     Revision of left knee 2006    LUMBAR DISC SURGERY  1999     Dr. Harrison    MYRINGOTOMY W/ TUBES Right 02/08/2021    tube insertion by Dr. Bahena    TONSILLECTOMY      TOTAL ABDOMINAL HYSTERECTOMY W/ BILATERAL SALPINGOOPHORECTOMY      age 38    TUBAL LIGATION         Social History     Tobacco Use    Smoking status: Never    Smokeless tobacco: Never   Substance Use Topics    Alcohol use: Never       Social History     Social  "History Narrative     since 1968. 2 children.Daughter in [place].  to a surgeon at [employer].  Son is a manager at [employer]..    3 grandsons.      was a  at [employer].    Enjoys reading, scrap booking, swimming.    Swims on a regular basis during the summer for 30 minutes.    Big [employer]ole fan.    Lives in a bilevel.  Has to either walk up or down entering the front door..        The following portions of the patient's history were reviewed and updated as appropriate: allergies, current medications, past family history, past medical history, past social history, past surgical history, and problem list.      Review of Systems       /72 (BP Location: Left arm, Patient Position: Sitting, Cuff Size: Standard)   Pulse 72   Temp 97.6 °F (36.4 °C) (Tympanic)   Ht 5' 2\" (1.575 m)   Wt 73 kg (161 lb)   SpO2 95%   BMI 29.45 kg/m²      Physical Exam   Appears well.  Lungs are clear.  Heart regular.  No murmur heard.  Abdomen nontender.  Pitting edema noted about [facility] up both legs 1-2+.  Mood is okay.  Affect appropriate.  A1c 6.0.              Current Outpatient Medications:     acetaminophen (TYLENOL) 650 mg CR tablet, , Disp: , Rfl:     albuterol (PROVENTIL HFA,VENTOLIN HFA) 90 mcg/act inhaler, Inhale 2 puffs every 4 (four) hours, Disp: 54 g, Rfl: 3    alendronate (Fosamax) 70 mg tablet, Take 1 tablet (70 mg total) by mouth every 7 days, Disp: 13 tablet, Rfl: 3    amLODIPine (NORVASC) 10 mg tablet, Take 1 tablet (10 mg total) by mouth daily, Disp: 90 tablet, Rfl: 3    betamethasone, augmented, (DIPROLENE-AF) 0.05 % cream, , Disp: , Rfl:     BLACK COHOSH PO, 1 tab twice daily, Disp: , Rfl:     Calcium Carbonate-Vitamin D (CALCIUM 500/D PO), Take 2 tablets by mouth, Disp: , Rfl:     chlorzoxazone (PARAFON FORTE) 500 mg tablet, Take 1 tablet (500 mg total) by mouth 4 (four) times a day as needed for muscle spasms, Disp: 60 tablet, Rfl: 5    Cholecalciferol 125 MCG (5000 UT) capsule, " Take 5,000 Units by mouth daily , Disp: , Rfl:     clobetasol (TEMOVATE) 0.05 % cream, Apply topically Taken as needed, Disp: , Rfl:     clotrimazole-betamethasone (LOTRISONE) 1-0.05 % cream, Apply topically 2 (two) times a day, Disp: 30 g, Rfl: 0    diclofenac (VOLTAREN) 75 mg EC tablet, Take 1 tablet (75 mg total) by mouth 2 (two) times a day, Disp: 60 tablet, Rfl: 5    ferrous sulfate 325 (65 Fe) mg tablet, One tablet Monday Wednesday Friday, Disp: , Rfl:     levothyroxine 88 mcg tablet, Take 1 tablet (88 mcg total) by mouth daily, Disp: 90 tablet, Rfl: 3    metFORMIN (GLUCOPHAGE-XR) 500 mg 24 hr tablet, Take 1 tablet (500 mg total) by mouth daily, Disp: 90 tablet, Rfl: 3    multivitamin (THERAGRAN) TABS, Take 1 tablet by mouth, Disp: , Rfl:     mupirocin (BACTROBAN) 2 % ointment, , Disp: , Rfl:     Omega-3 Fatty Acids (FISH OIL) 1200 MG CAPS, Take by mouth, Disp: , Rfl:     simvastatin (ZOCOR) 10 mg tablet, Take 1 tablet (10 mg total) by mouth daily, Disp: 90 tablet, Rfl: 3    triamcinolone (KENALOG) 0.1 % ointment, , Disp: , Rfl:     beclomethasone (QVAR REDIHALER) 40 MCG/ACT inhaler, Inhale 2 puffs daily Rinse mouth after use., Disp: 10.6 g, Rfl: 3    fluticasone (FLONASE) 50 mcg/act nasal spray, 2 sprays into each nostril, Disp: , Rfl:      No problem-specific Assessment & Plan notes found for this encounter.       Diagnoses and all orders for this visit:    Chronic combined systolic and diastolic congestive heart failure (HCC)    Benign essential hypertension    Mixed hyperlipidemia    Acquired hypothyroidism    Type 2 diabetes mellitus without complication, without long-term current use of insulin (HCC)  -     POCT hemoglobin A1c    Localized osteoporosis without current pathological fracture        Patient Instructions   Diabetes well-controlled with A1c of 6.0.  There is some swelling in the legs consistent with heart failure.  Torsemide 10 mg can be 1 or 2 tablets daily.  Not necessary to take if there  is no swelling there but probably should take 10 mg every day for the most part.  Other medicines stay the same.  Recheck in 2 months.

## 2024-08-20 ENCOUNTER — APPOINTMENT (EMERGENCY)
Dept: RADIOLOGY | Facility: HOSPITAL | Age: 84
End: 2024-08-20
Payer: COMMERCIAL

## 2024-08-20 ENCOUNTER — HOSPITAL ENCOUNTER (EMERGENCY)
Facility: HOSPITAL | Age: 84
Discharge: HOME | End: 2024-08-20
Attending: EMERGENCY MEDICINE
Payer: COMMERCIAL

## 2024-08-20 VITALS
OXYGEN SATURATION: 95 % | SYSTOLIC BLOOD PRESSURE: 165 MMHG | RESPIRATION RATE: 19 BRPM | TEMPERATURE: 98.3 F | HEART RATE: 86 BPM | DIASTOLIC BLOOD PRESSURE: 87 MMHG

## 2024-08-20 DIAGNOSIS — Z20.822 EXPOSURE TO COVID-19 VIRUS: Primary | ICD-10-CM

## 2024-08-20 LAB
FLUAV RNA RESP QL NAA+PROBE: NEGATIVE
FLUBV RNA RESP QL NAA+PROBE: NEGATIVE
RSV RNA RESP QL NAA+PROBE: NEGATIVE
SARS-COV-2 RNA RESP QL NAA+PROBE: POSITIVE

## 2024-08-20 PROCEDURE — 71046 X-RAY EXAM CHEST 2 VIEWS: CPT

## 2024-08-20 PROCEDURE — 99284 EMERGENCY DEPT VISIT MOD MDM: CPT

## 2024-08-20 PROCEDURE — 99284 EMERGENCY DEPT VISIT MOD MDM: CPT | Performed by: PHYSICIAN ASSISTANT

## 2024-08-20 PROCEDURE — 0241U HB NFCT DS VIR RESP RNA 4 TRGT: CPT | Performed by: PHYSICIAN ASSISTANT

## 2024-08-21 NOTE — RESULT ENCOUNTER NOTE
Informed of +covid result. Advised patient to self isolate until sx improving and fever free x 24 hours, go to ED for worsening SOB, f/u with PCP

## 2024-08-21 NOTE — ED PROVIDER NOTES
History  Chief Complaint   Patient presents with    COVID-19 Swab Only     Pt requesting to be tested for COVID as her  was also tested. Pt reports productive cough x 1 week      This is an 84-year-old female with past medical history of CHF, HLD, HTN, asthma presenting to ED for COVID swab.  Patient is here with  who was just tested for COVID as well.  Patient states over the past week she has been having a productive cough.  She denies any fevers or chills.  She states that she has some chest congestion no nasal congestion.  She denies any sore throat, abdominal pain, shortness of breath, chest pain, nausea or vomiting.  She states that she has been using her inhalers as prescribed, does not feel that she is using them any more frequently than normal.        History provided by:  Patient   used: No        Prior to Admission Medications   Prescriptions Last Dose Informant Patient Reported? Taking?   BLACK COHOSH PO  Self Yes No   Si tab twice daily   Calcium Carbonate-Vitamin D (CALCIUM 500/D PO)  Self Yes No   Sig: Take 2 tablets by mouth   Cholecalciferol 125 MCG (5000 UT) capsule  Self Yes No   Sig: Take 5,000 Units by mouth daily    Omega-3 Fatty Acids (FISH OIL) 1200 MG CAPS  Self Yes No   Sig: Take by mouth   acetaminophen (TYLENOL) 650 mg CR tablet  Self Yes No   albuterol (PROVENTIL HFA,VENTOLIN HFA) 90 mcg/act inhaler   No No   Sig: Inhale 2 puffs every 4 (four) hours   alendronate (Fosamax) 70 mg tablet  Self No No   Sig: Take 1 tablet (70 mg total) by mouth every 7 days   amLODIPine (NORVASC) 10 mg tablet   No No   Sig: Take 1 tablet (10 mg total) by mouth daily   beclomethasone (QVAR REDIHALER) 40 MCG/ACT inhaler  Self No No   Sig: Inhale 2 puffs daily Rinse mouth after use.   betamethasone, augmented, (DIPROLENE-AF) 0.05 % cream  Self Yes No   chlorzoxazone (PARAFON FORTE) 500 mg tablet  Self No No   Sig: Take 1 tablet (500 mg total) by mouth 4 (four) times a day  as needed for muscle spasms   clobetasol (TEMOVATE) 0.05 % cream  Self Yes No   Sig: Apply topically Taken as needed   clotrimazole-betamethasone (LOTRISONE) 1-0.05 % cream  Self No No   Sig: Apply topically 2 (two) times a day   diclofenac (VOLTAREN) 75 mg EC tablet  Self No No   Sig: Take 1 tablet (75 mg total) by mouth 2 (two) times a day   ferrous sulfate 325 (65 Fe) mg tablet  Self No No   Sig: One tablet    fluticasone (FLONASE) 50 mcg/act nasal spray  Self Yes No   Si sprays into each nostril   levothyroxine 88 mcg tablet   No No   Sig: Take 1 tablet (88 mcg total) by mouth daily   metFORMIN (GLUCOPHAGE-XR) 500 mg 24 hr tablet   No No   Sig: Take 1 tablet (500 mg total) by mouth daily   multivitamin (THERAGRAN) TABS  Self Yes No   Sig: Take 1 tablet by mouth   mupirocin (BACTROBAN) 2 % ointment  Self Yes No   simvastatin (ZOCOR) 10 mg tablet   No No   Sig: Take 1 tablet (10 mg total) by mouth daily   torsemide (DEMADEX) 10 mg tablet   No No   Si-2 tablets daily as needed for leg swelling   triamcinolone (KENALOG) 0.1 % ointment  Self Yes No      Facility-Administered Medications: None       Past Medical History:   Diagnosis Date    Acquired hypothyroidism 2010    Anxiety state 2010    Chronic combined systolic and diastolic congestive heart failure (HCC) 2024    Chronic low back pain 2015    Eczema 2014    Hearing loss 2014    Hyperlipidemia 2015    Hypertension     Iron deficiency anemia 2010    Mild persistent asthma without complication 2010    Non morbid obesity due to excess calories 2017    Osteoarthritis 2015    Osteoporosis     Rectocele     grade 2    Skin cancer     treating with dermatologist    Type 2 diabetes mellitus without complication, without long-term current use of insulin ([facility]) 2018    Vitamin D deficiency 2013       Past Surgical History:   Procedure Laterality Date     APPENDECTOMY      CATARACT EXTRACTION Bilateral     and lens implants    CHOLECYSTECTOMY      HYSTERECTOMY      age 38    KNEE SURGERY Bilateral     Revision of left knee 2006    LUMBAR DISC SURGERY  1999     Dr. Harrison    MYRINGOTOMY W/ TUBES Right 02/08/2021    tube insertion by Dr. Bahena    TONSILLECTOMY      TOTAL ABDOMINAL HYSTERECTOMY W/ BILATERAL SALPINGOOPHORECTOMY      age 38    TUBAL LIGATION         Family History   Problem Relation Age of Onset    Stroke Mother     Heart disease Father     No Known Problems Sister     No Known Problems Daughter     No Known Problems Maternal Grandmother     No Known Problems Maternal Grandfather     No Known Problems Paternal Grandmother     No Known Problems Paternal Grandfather     No Known Problems Maternal Aunt     No Known Problems Maternal Aunt     No Known Problems Paternal Aunt     No Known Problems Paternal Aunt     Breast cancer Neg Hx      I have reviewed and agree with the history as documented.    E-Cigarette/Vaping    E-Cigarette Use Never User      E-Cigarette/Vaping Substances    Nicotine No     THC No     CBD No     Flavoring No      Social History     Tobacco Use    Smoking status: Never    Smokeless tobacco: Never   Vaping Use    Vaping status: Never Used   Substance Use Topics    Alcohol use: Never    Drug use: No       Review of Systems   Constitutional:  Negative for chills and fever.   HENT:  Negative for congestion and sore throat.    Respiratory:  Positive for cough. Negative for chest tightness and shortness of breath.    Cardiovascular:  Negative for chest pain.   Gastrointestinal:  Negative for abdominal pain, nausea and vomiting.   Neurological:  Negative for light-headedness and headaches.   All other systems reviewed and are negative.      Physical Exam  Physical Exam  Vitals reviewed.   Constitutional:       General: She is not in acute distress.     Appearance: Normal appearance. She is well-developed and well-groomed. She is not  ill-appearing, toxic-appearing or diaphoretic.   HENT:      Head: Normocephalic and atraumatic.      Right Ear: External ear normal.      Left Ear: External ear normal.      Nose: Nose normal. No congestion or rhinorrhea.      Mouth/Throat:      Mouth: Mucous membranes are moist.      Pharynx: Oropharynx is clear. No oropharyngeal exudate or posterior oropharyngeal erythema.   Eyes:      General: No scleral icterus.        Right eye: No discharge.         Left eye: No discharge.      Extraocular Movements: Extraocular movements intact.      Conjunctiva/sclera: Conjunctivae normal.   Cardiovascular:      Rate and Rhythm: Normal rate and regular rhythm.      Pulses: Normal pulses.      Heart sounds: No murmur heard.     No friction rub. No gallop.   Pulmonary:      Effort: Pulmonary effort is normal. No respiratory distress.      Breath sounds: Normal breath sounds. No wheezing, rhonchi or rales.   Abdominal:      General: Abdomen is flat. There is no distension.   Musculoskeletal:         General: No deformity. Normal range of motion.      Cervical back: Normal range of motion and neck supple.      Comments: Kyphotic spine   Skin:     General: Skin is warm and dry.      Coloration: Skin is not jaundiced or pale.      Findings: No rash.   Neurological:      General: No focal deficit present.      Mental Status: She is alert.   Psychiatric:         Mood and Affect: Mood normal.         Behavior: Behavior normal. Behavior is cooperative.         Vital Signs  ED Triage Vitals [08/20/24 1954]   Temperature Pulse Respirations Blood Pressure SpO2   98.3 °F (36.8 °C) 86 19 165/87 95 %      Temp Source Heart Rate Source Patient Position - Orthostatic VS BP Location FiO2 (%)   Oral Monitor -- Right arm --      Pain Score       --           Vitals:    08/20/24 1954   BP: 165/87   Pulse: 86         Visual Acuity      ED Medications  Medications - No data to display    Diagnostic Studies  Results Reviewed       Procedure  Component Value Units Date/Time    FLU/RSV/COVID - if FLU/RSV clinically relevant [[id number]] Collected: 08/20/24 2031    Lab Status: In process Specimen: Nares from Nose Updated: 08/20/24 2034                   XR chest 2 views   ED Interpretation by Nanda Donahue PA-C (08/20 2056)   Appears to have hiatal hernia, consistent with prev CXR, no acute cardiopulmonary disease as interpreted by myself                 Procedures  Procedures         ED Course                                 SBIRT 22yo+      Flowsheet Row Most Recent Value   Initial Alcohol Screen: US AUDIT-C     1. How often do you have a drink containing alcohol? 0 Filed at: 08/20/2024 1957   2. How many drinks containing alcohol do you have on a typical day you are drinking?  0 Filed at: 08/20/2024 1957   3a. Male UNDER 65: How often do you have five or more drinks on one occasion? 0 Filed at: 08/20/2024 1957   3b. FEMALE Any Age, or MALE 65+: How often do you have 4 or more drinks on one occassion? 0 Filed at: 08/20/2024 1957   Audit-C Score 0 Filed at: 08/20/2024 1957   ROXANNE: How many times in the past year have you...    Used an illegal drug or used a prescription medication for non-medical reasons? Never Filed at: 08/20/2024 1957                      Medical Decision Making      DDx including but not limited to:  URI, bronchitis, PNA, viral illness, COVID 19, adverse medication reaction.     The patient is stable and has a history and physical exam consistent with a viral illness. COVID/Flu testing has been performed.  I considered the patient's other medical conditions as applicable/noted above in my medical decision making.  The patient is stable upon discharge.     PLAN:  The plan is for supportive care at home.  Symptomatic therapy suggested: rest, increase fluids, gargle prn for sore throat, OTC cough suppressant of choice, and call prn if symptoms persist or worsen. Call or go to PCP if these symptoms persist or fail to improve as  anticipated. Return to ER precautions discussed as well.    Prior to discharge, discharge instructions were discussed with patient at bedside. Patient was provided both verbal and written instructions. Patient is understanding of the discharge instructions and is agreeable to plan of care. Return precautions were discussed with patient bedside, patient verbalized understanding of signs and symptoms that would necessitate return to the ED. All questions were answered. Patient was comfortable with the plan of care and discharged to home.     Dispo: discharge home with follow up to PCP. Patient stable, in no acute distress and non-toxic at discharge.    Problems Addressed:  Exposure to COVID-19 virus: acute illness or injury    Amount and/or Complexity of Data Reviewed  Radiology: ordered and independent interpretation performed.                 Disposition  Final diagnoses:   Exposure to COVID-19 virus     Time reflects when diagnosis was documented in both MDM as applicable and the Disposition within this note       Time User Action Codes Description Comment    8/20/2024  9:11 PM Nanda Donahue [Z20.822] Exposure to COVID-19 virus           ED Disposition       ED Disposition   Discharge    Condition   Stable    Date/Time   Tue Aug 20, 2024 2110    Comment   Mechelle Maldonado discharge to home/self care.             Follow-up Information       Follow up With Specialties Details Why Contact Info    Rik Bell MD Family Medicine Schedule an appointment as soon as possible for a visit  As needed [address] [place] [id number] [phone]              Current Discharge Medication List        CONTINUE these medications which have NOT CHANGED    Details   acetaminophen (TYLENOL) 650 mg CR tablet       albuterol (PROVENTIL HFA,VENTOLIN HFA) 90 mcg/act inhaler Inhale 2 puffs every 4 (four) hours  Qty: 54 g, Refills: 3    Comments: Substitution to a formulary equivalent within the same pharmaceutical class  is authorized.  Associated Diagnoses: Mild intermittent asthma with acute exacerbation      alendronate (Fosamax) 70 mg tablet Take 1 tablet (70 mg total) by mouth every 7 days  Qty: 13 tablet, Refills: 3    Associated Diagnoses: Localized osteoporosis without current pathological fracture      amLODIPine (NORVASC) 10 mg tablet Take 1 tablet (10 mg total) by mouth daily  Qty: 90 tablet, Refills: 3    Associated Diagnoses: Benign essential hypertension      beclomethasone (QVAR REDIHALER) 40 MCG/ACT inhaler Inhale 2 puffs daily Rinse mouth after use.  Qty: 10.6 g, Refills: 3    Associated Diagnoses: Mild intermittent asthma without complication      betamethasone, augmented, (DIPROLENE-AF) 0.05 % cream       BLACK COHOSH PO 1 tab twice daily      Calcium Carbonate-Vitamin D (CALCIUM 500/D PO) Take 2 tablets by mouth      chlorzoxazone (PARAFON FORTE) 500 mg tablet Take 1 tablet (500 mg total) by mouth 4 (four) times a day as needed for muscle spasms  Qty: 60 tablet, Refills: 5    Associated Diagnoses: Chronic low back pain without sciatica, unspecified back pain laterality      Cholecalciferol 125 MCG (5000 UT) capsule Take 5,000 Units by mouth daily       clobetasol (TEMOVATE) 0.05 % cream Apply topically Taken as needed      clotrimazole-betamethasone (LOTRISONE) 1-0.05 % cream Apply topically 2 (two) times a day  Qty: 30 g, Refills: 0    Associated Diagnoses: Tinea cruris      diclofenac (VOLTAREN) 75 mg EC tablet Take 1 tablet (75 mg total) by mouth 2 (two) times a day  Qty: 60 tablet, Refills: 5    Associated Diagnoses: Chronic low back pain without sciatica, unspecified back pain laterality      ferrous sulfate 325 (65 Fe) mg tablet One tablet Monday Wednesday Friday    Associated Diagnoses: Type 2 diabetes mellitus without complication, without long-term current use of insulin ([facility])      fluticasone (FLONASE) 50 mcg/act nasal spray 2 sprays into each nostril      levothyroxine 88 mcg tablet Take 1 tablet (88  mcg total) by mouth daily  Qty: 90 tablet, Refills: 3    Associated Diagnoses: Acquired hypothyroidism      metFORMIN (GLUCOPHAGE-XR) 500 mg 24 hr tablet Take 1 tablet (500 mg total) by mouth daily  Qty: 90 tablet, Refills: 3    Comments: continue long acting prep  Associated Diagnoses: Type 2 diabetes mellitus without complication, without long-term current use of insulin (HCC)      multivitamin (THERAGRAN) TABS Take 1 tablet by mouth      mupirocin (BACTROBAN) 2 % ointment       Omega-3 Fatty Acids (FISH OIL) 1200 MG CAPS Take by mouth      simvastatin (ZOCOR) 10 mg tablet Take 1 tablet (10 mg total) by mouth daily  Qty: 90 tablet, Refills: 3    Associated Diagnoses: Mixed hyperlipidemia      torsemide (DEMADEX) 10 mg tablet 1-2 tablets daily as needed for leg swelling  Qty: 180 tablet, Refills: 3    Associated Diagnoses: Chronic combined systolic and diastolic congestive heart failure (HCC)      triamcinolone (KENALOG) 0.1 % ointment              No discharge procedures on file.    PDMP Review         Value Time User    PDMP Reviewed  Yes 5/6/2024  8:04 AM Rik Bell MD            ED Provider  Electronically Signed by DORCAS Mora PA-C  08/20/24 0184

## 2024-08-26 ENCOUNTER — OFFICE VISIT (OUTPATIENT)
Dept: FAMILY MEDICINE CLINIC | Facility: CLINIC | Age: 84
End: 2024-08-26
Payer: COMMERCIAL

## 2024-08-26 VITALS
HEIGHT: 62 IN | DIASTOLIC BLOOD PRESSURE: 64 MMHG | SYSTOLIC BLOOD PRESSURE: 132 MMHG | WEIGHT: 157.8 LBS | BODY MASS INDEX: 29.04 KG/M2 | TEMPERATURE: 97.3 F | HEART RATE: 66 BPM | OXYGEN SATURATION: 98 %

## 2024-08-26 DIAGNOSIS — U07.1 COVID-19 VIRUS INFECTION: Primary | ICD-10-CM

## 2024-08-26 PROCEDURE — G2211 COMPLEX E/M VISIT ADD ON: HCPCS | Performed by: FAMILY MEDICINE

## 2024-08-26 PROCEDURE — 99213 OFFICE O/P EST LOW 20 MIN: CPT | Performed by: FAMILY MEDICINE

## 2024-08-26 RX ORDER — CODEINE PHOSPHATE/GUAIFENESIN 10-100MG/5
LIQUID (ML) ORAL
Qty: 240 ML | Refills: 0 | Status: SHIPPED | OUTPATIENT
Start: 2024-08-26

## 2024-08-26 NOTE — PATIENT INSTRUCTIONS
COVID symptoms need to run their course.  Robitussin with codeine 1 or 2 teaspoons every 4 hours as needed for cough.  She is aware that it can be constipating.  Suggest to Tylenol 3 times a day.  Cough drops and tea.  Follow-up as scheduled.

## 2024-08-26 NOTE — PROGRESS NOTES
Chief Complaint   Patient presents with    COVID-19     Symptoms started last Tuesday.    Cough     Chest congestion        HPI   Here with cough and congestion.  Diagnosed with COVID 5 days ago.   was hospitalized for same.  Seen in the ER on 8/20 with complaint of a productive cough for the previous week.  At that time, no fevers or chills.  Did have nasal congestion.  In the ER, chest x-ray was okay.  States that over the last couple days, cough was not too bad.  Started up again this morning.  Using Robitussin and TheraFlu.  Slept in her recliner.  Did have some diarrhea.  No nausea or vomiting.  Has not had COVID before.    Past Medical History:   Diagnosis Date    Acquired hypothyroidism 01/07/2010    Anxiety state 07/29/2010    Chronic combined systolic and diastolic congestive heart failure (HCC) 08/12/2024    Chronic low back pain 06/03/2015    Eczema 03/11/2014    Hearing loss 01/14/2014    Hyperlipidemia 06/03/2015    Hypertension     Iron deficiency anemia 04/06/2010    Mild persistent asthma without complication 04/06/2010    Non morbid obesity due to excess calories 09/06/2017    Osteoarthritis 11/09/2015    Osteoporosis     Rectocele     grade 2    Skin cancer     treating with dermatologist    Type 2 diabetes mellitus without complication, without long-term current use of insulin (HCC) 12/02/2018    Vitamin D deficiency 07/17/2013        Past Surgical History:   Procedure Laterality Date    APPENDECTOMY      CATARACT EXTRACTION Bilateral     and lens implants    CHOLECYSTECTOMY      HYSTERECTOMY      age 38    KNEE SURGERY Bilateral     Revision of left knee 2006    LUMBAR DISC SURGERY  1999     Dr. Harrison    MYRINGOTOMY W/ TUBES Right 02/08/2021    tube insertion by Dr. Bahena    TONSILLECTOMY      TOTAL ABDOMINAL HYSTERECTOMY W/ BILATERAL SALPINGOOPHORECTOMY      age 38    TUBAL LIGATION         Social History     Tobacco Use    Smoking status: Never    Smokeless tobacco: Never  "  Substance Use Topics    Alcohol use: Never       Social History     Social History Narrative     since 1968. 2 children.Daughter in [place].  to a surgeon at [employer].  Son is a manager at [employer]..    3 grandsons.      was a  at [employer].    Enjoys reading, scrap booking, swimming.    Swims on a regular basis during the summer for 30 minutes.    Big Oriole fan.    Lives in a bilevel.  Has to either walk up or down entering the front door..        The following portions of the patient's history were reviewed and updated as appropriate: allergies, current medications, past family history, past medical history, past social history, past surgical history, and problem list.      Review of Systems       /64 (BP Location: Right arm, Patient Position: Sitting, Cuff Size: Standard)   Pulse 66   Temp (!) 97.3 °F (36.3 °C) (Temporal)   Ht 5' 2\" (1.575 m)   Wt 71.6 kg (157 lb 12.8 oz)   SpO2 98%   BMI 28.86 kg/m²      Physical Exam   Breathing comfortably with a mask in place.  Occasional hacking cough noted.  Lungs are clear.  No rhonchi or wheezing.  Heart regular.              Current Outpatient Medications:     acetaminophen (TYLENOL) 650 mg CR tablet, , Disp: , Rfl:     albuterol (PROVENTIL HFA,VENTOLIN HFA) 90 mcg/act inhaler, Inhale 2 puffs every 4 (four) hours, Disp: 54 g, Rfl: 3    alendronate (Fosamax) 70 mg tablet, Take 1 tablet (70 mg total) by mouth every 7 days, Disp: 13 tablet, Rfl: 3    amLODIPine (NORVASC) 10 mg tablet, Take 1 tablet (10 mg total) by mouth daily, Disp: 90 tablet, Rfl: 3    betamethasone, augmented, (DIPROLENE-AF) 0.05 % cream, , Disp: , Rfl:     BLACK COHOSH PO, 1 tab twice daily, Disp: , Rfl:     Calcium Carbonate-Vitamin D (CALCIUM 500/D PO), Take 2 tablets by mouth, Disp: , Rfl:     chlorzoxazone (PARAFON FORTE) 500 mg tablet, Take 1 tablet (500 mg total) by mouth 4 (four) times a day as needed for muscle spasms, Disp: 60 tablet, Rfl: 5    " Cholecalciferol 125 MCG (5000 UT) capsule, Take 5,000 Units by mouth daily , Disp: , Rfl:     clobetasol (TEMOVATE) 0.05 % cream, Apply topically Taken as needed, Disp: , Rfl:     clotrimazole-betamethasone (LOTRISONE) 1-0.05 % cream, Apply topically 2 (two) times a day, Disp: 30 g, Rfl: 0    diclofenac (VOLTAREN) 75 mg EC tablet, Take 1 tablet (75 mg total) by mouth 2 (two) times a day, Disp: 60 tablet, Rfl: 5    ferrous sulfate 325 (65 Fe) mg tablet, One tablet Monday Wednesday Friday, Disp: , Rfl:     guaifenesin-codeine (GUAIFENESIN AC) 100-10 MG/5ML liquid, 1-2 tsp every 4 hours as needed for cough, Disp: 240 mL, Rfl: 0    levothyroxine 88 mcg tablet, Take 1 tablet (88 mcg total) by mouth daily, Disp: 90 tablet, Rfl: 3    metFORMIN (GLUCOPHAGE-XR) 500 mg 24 hr tablet, Take 1 tablet (500 mg total) by mouth daily, Disp: 90 tablet, Rfl: 3    multivitamin (THERAGRAN) TABS, Take 1 tablet by mouth, Disp: , Rfl:     mupirocin (BACTROBAN) 2 % ointment, , Disp: , Rfl:     Omega-3 Fatty Acids (FISH OIL) 1200 MG CAPS, Take by mouth, Disp: , Rfl:     simvastatin (ZOCOR) 10 mg tablet, Take 1 tablet (10 mg total) by mouth daily, Disp: 90 tablet, Rfl: 3    torsemide (DEMADEX) 10 mg tablet, 1-2 tablets daily as needed for leg swelling, Disp: 180 tablet, Rfl: 3    triamcinolone (KENALOG) 0.1 % ointment, , Disp: , Rfl:     beclomethasone (QVAR REDIHALER) 40 MCG/ACT inhaler, Inhale 2 puffs daily Rinse mouth after use., Disp: 10.6 g, Rfl: 3    fluticasone (FLONASE) 50 mcg/act nasal spray, 2 sprays into each nostril, Disp: , Rfl:      No problem-specific Assessment & Plan notes found for this encounter.       Diagnoses and all orders for this visit:    COVID-19 virus infection  -     guaifenesin-codeine (GUAIFENESIN AC) 100-10 MG/5ML liquid; 1-2 tsp every 4 hours as needed for cough        Patient Instructions   COVID symptoms need to run their course.  Robitussin with codeine 1 or 2 teaspoons every 4 hours as needed for cough.   She is aware that it can be constipating.  Suggest to Tylenol 3 times a day.  Cough drops and tea.  Follow-up as scheduled.

## 2024-08-28 ENCOUNTER — TELEPHONE (OUTPATIENT)
Age: 84
End: 2024-08-28

## 2024-08-28 NOTE — TELEPHONE ENCOUNTER
Attempted to warm transfer but I couldn't get an answer, Mechelle and Theodore wanted to send a Thank You to Krystina Corona for the beautiful flower she gave them today, If you could give them a call back to the number on file

## 2024-09-30 DIAGNOSIS — E03.9 ACQUIRED HYPOTHYROIDISM: ICD-10-CM

## 2024-09-30 DIAGNOSIS — E78.2 MIXED HYPERLIPIDEMIA: ICD-10-CM

## 2024-09-30 DIAGNOSIS — I10 BENIGN ESSENTIAL HYPERTENSION: Primary | ICD-10-CM

## 2024-10-15 ENCOUNTER — APPOINTMENT (OUTPATIENT)
Age: 84
End: 2024-10-15
Payer: COMMERCIAL

## 2024-10-15 ENCOUNTER — OFFICE VISIT (OUTPATIENT)
Age: 84
End: 2024-10-15
Payer: COMMERCIAL

## 2024-10-15 VITALS
SYSTOLIC BLOOD PRESSURE: 138 MMHG | HEART RATE: 81 BPM | BODY MASS INDEX: 28.56 KG/M2 | OXYGEN SATURATION: 96 % | HEIGHT: 62 IN | WEIGHT: 155.2 LBS | DIASTOLIC BLOOD PRESSURE: 88 MMHG | RESPIRATION RATE: 18 BRPM | TEMPERATURE: 97.8 F

## 2024-10-15 DIAGNOSIS — I10 BENIGN ESSENTIAL HYPERTENSION: ICD-10-CM

## 2024-10-15 DIAGNOSIS — E78.2 MIXED HYPERLIPIDEMIA: ICD-10-CM

## 2024-10-15 DIAGNOSIS — L30.9 DERMATITIS: Primary | ICD-10-CM

## 2024-10-15 DIAGNOSIS — E03.9 ACQUIRED HYPOTHYROIDISM: ICD-10-CM

## 2024-10-15 LAB
ALBUMIN SERPL BCG-MCNC: 4.2 G/DL (ref 3.5–5)
ALP SERPL-CCNC: 48 U/L (ref 34–104)
ALT SERPL W P-5'-P-CCNC: 13 U/L (ref 7–52)
ANION GAP SERPL CALCULATED.3IONS-SCNC: 8 MMOL/L (ref 4–13)
AST SERPL W P-5'-P-CCNC: 21 U/L (ref 13–39)
BASOPHILS # BLD AUTO: 0.02 THOUSANDS/ΜL (ref 0–0.1)
BASOPHILS NFR BLD AUTO: 0 % (ref 0–1)
BILIRUB SERPL-MCNC: 0.6 MG/DL (ref 0.2–1)
BUN SERPL-MCNC: 15 MG/DL (ref 5–25)
CALCIUM SERPL-MCNC: 10.2 MG/DL (ref 8.4–10.2)
CHLORIDE SERPL-SCNC: 101 MMOL/L (ref 96–108)
CHOLEST SERPL-MCNC: 179 MG/DL
CO2 SERPL-SCNC: 29 MMOL/L (ref 21–32)
CREAT SERPL-MCNC: 0.7 MG/DL (ref 0.6–1.3)
EOSINOPHIL # BLD AUTO: 0.12 THOUSAND/ΜL (ref 0–0.61)
EOSINOPHIL NFR BLD AUTO: 2 % (ref 0–6)
ERYTHROCYTE [DISTWIDTH] IN BLOOD BY AUTOMATED COUNT: 14.2 % (ref 11.6–15.1)
GFR SERPL CREATININE-BSD FRML MDRD: 80 ML/MIN/1.73SQ M
GLUCOSE P FAST SERPL-MCNC: 115 MG/DL (ref 65–99)
HCT VFR BLD AUTO: 39.1 % (ref 34.8–46.1)
HDLC SERPL-MCNC: 75 MG/DL
HGB BLD-MCNC: 12.5 G/DL (ref 11.5–15.4)
IMM GRANULOCYTES # BLD AUTO: 0.02 THOUSAND/UL (ref 0–0.2)
IMM GRANULOCYTES NFR BLD AUTO: 0 % (ref 0–2)
LDLC SERPL CALC-MCNC: 81 MG/DL (ref 0–100)
LYMPHOCYTES # BLD AUTO: 1.76 THOUSANDS/ΜL (ref 0.6–4.47)
LYMPHOCYTES NFR BLD AUTO: 28 % (ref 14–44)
MCH RBC QN AUTO: 30.6 PG (ref 26.8–34.3)
MCHC RBC AUTO-ENTMCNC: 32 G/DL (ref 31.4–37.4)
MCV RBC AUTO: 96 FL (ref 82–98)
MONOCYTES # BLD AUTO: 0.78 THOUSAND/ΜL (ref 0.17–1.22)
MONOCYTES NFR BLD AUTO: 13 % (ref 4–12)
NEUTROPHILS # BLD AUTO: 3.5 THOUSANDS/ΜL (ref 1.85–7.62)
NEUTS SEG NFR BLD AUTO: 57 % (ref 43–75)
NRBC BLD AUTO-RTO: 0 /100 WBCS
PLATELET # BLD AUTO: 331 THOUSANDS/UL (ref 149–390)
PMV BLD AUTO: 10.5 FL (ref 8.9–12.7)
POTASSIUM SERPL-SCNC: 4.1 MMOL/L (ref 3.5–5.3)
PROT SERPL-MCNC: 7.1 G/DL (ref 6.4–8.4)
RBC # BLD AUTO: 4.08 MILLION/UL (ref 3.81–5.12)
SODIUM SERPL-SCNC: 138 MMOL/L (ref 135–147)
TRIGL SERPL-MCNC: 115 MG/DL
TSH SERPL DL<=0.05 MIU/L-ACNC: 3.88 UIU/ML (ref 0.45–4.5)
WBC # BLD AUTO: 6.2 THOUSAND/UL (ref 4.31–10.16)

## 2024-10-15 PROCEDURE — 80053 COMPREHEN METABOLIC PANEL: CPT

## 2024-10-15 PROCEDURE — 99213 OFFICE O/P EST LOW 20 MIN: CPT

## 2024-10-15 PROCEDURE — 85025 COMPLETE CBC W/AUTO DIFF WBC: CPT

## 2024-10-15 PROCEDURE — 84443 ASSAY THYROID STIM HORMONE: CPT

## 2024-10-15 PROCEDURE — 80061 LIPID PANEL: CPT

## 2024-10-15 PROCEDURE — 36415 COLL VENOUS BLD VENIPUNCTURE: CPT

## 2024-10-15 NOTE — PATIENT INSTRUCTIONS
Make follow up appointment with your Dermatologist for monitoring of the skin lesions you have.  I would recommend at this time to hold off on the steroid cream(s) as you have been using it for several weeks without improvement and prolonged use of Steroid cream(s) can actually injure the skin.  You can continue using the Mupirocin for the skin lesions on your legs that is round and appears to have a divot to it.

## 2024-10-15 NOTE — PROGRESS NOTES
[facility]        NAME: Mechelle Maldonado is a 84 y.o. female  : 1940    MRN: [id number]  DATE: October 15, 2024  TIME: 12:12 PM    Assessment and Plan   Dermatitis [L30.9]  1. Dermatitis              Patient Instructions       Follow up with Primary Care Provider in 3-5 days if not improving.  Proceed to Emergency Department if symptoms worsen.    If tests have been performed at [facility], our office will contact you with results if changes need to be made to the care plan discussed with you at the visit.  [facility]    Chief Complaint     Chief Complaint   Patient presents with   • Dermatitis     Skin problem for years. She applies prescribed creams and they don't seem to be working lately. Looking for a second opinion. Bilateral arms and legs.          History of Present Illness       Patient reports she has been having several skin lesions on her arms and her legs since the summer and has been ongoing for years.  States she has been using skin creams prescribed by her dermatologist and they are not resolving her skin issues.  States that some of the lesions started over the summer and could have been due to a bug bite.  She has been continuously using steroid skin creams as well as mupirocin without improvement.      Review of Systems   Review of Systems   Constitutional:  Negative for fever.   Respiratory:  Negative for shortness of breath.    Cardiovascular:  Negative for chest pain.   Skin:  Positive for rash and wound.         Current Medications       Current Outpatient Medications:   •  acetaminophen (TYLENOL) 650 mg CR tablet, , Disp: , Rfl:   •  albuterol (PROVENTIL HFA,VENTOLIN HFA) 90 mcg/act inhaler, Inhale 2 puffs every 4 (four) hours, Disp: 54 g, Rfl: 3  •  alendronate (Fosamax) 70 mg tablet, Take 1 tablet (70 mg total) by mouth every 7 days, Disp: 13 tablet, Rfl: 3  •  amLODIPine (NORVASC) 10 mg tablet, Take 1 tablet (10 mg total) by mouth daily,  Disp: 90 tablet, Rfl: 3  •  betamethasone, augmented, (DIPROLENE-AF) 0.05 % cream, , Disp: , Rfl:   •  BLACK COHOSH PO, 1 tab twice daily, Disp: , Rfl:   •  Calcium Carbonate-Vitamin D (CALCIUM 500/D PO), Take 2 tablets by mouth, Disp: , Rfl:   •  chlorzoxazone (PARAFON FORTE) 500 mg tablet, Take 1 tablet (500 mg total) by mouth 4 (four) times a day as needed for muscle spasms, Disp: 60 tablet, Rfl: 5  •  Cholecalciferol 125 MCG (5000 UT) capsule, Take 5,000 Units by mouth daily , Disp: , Rfl:   •  clobetasol (TEMOVATE) 0.05 % cream, Apply topically Taken as needed, Disp: , Rfl:   •  clotrimazole-betamethasone (LOTRISONE) 1-0.05 % cream, Apply topically 2 (two) times a day, Disp: 30 g, Rfl: 0  •  diclofenac (VOLTAREN) 75 mg EC tablet, Take 1 tablet (75 mg total) by mouth 2 (two) times a day, Disp: 60 tablet, Rfl: 5  •  ferrous sulfate 325 (65 Fe) mg tablet, One tablet Monday Wednesday Friday, Disp: , Rfl:   •  levothyroxine 88 mcg tablet, Take 1 tablet (88 mcg total) by mouth daily, Disp: 90 tablet, Rfl: 3  •  metFORMIN (GLUCOPHAGE-XR) 500 mg 24 hr tablet, Take 1 tablet (500 mg total) by mouth daily, Disp: 90 tablet, Rfl: 3  •  multivitamin (THERAGRAN) TABS, Take 1 tablet by mouth, Disp: , Rfl:   •  mupirocin (BACTROBAN) 2 % ointment, , Disp: , Rfl:   •  Omega-3 Fatty Acids (FISH OIL) 1200 MG CAPS, Take by mouth, Disp: , Rfl:   •  simvastatin (ZOCOR) 10 mg tablet, Take 1 tablet (10 mg total) by mouth daily, Disp: 90 tablet, Rfl: 3  •  torsemide (DEMADEX) 10 mg tablet, 1-2 tablets daily as needed for leg swelling, Disp: 180 tablet, Rfl: 3  •  triamcinolone (KENALOG) 0.1 % ointment, , Disp: , Rfl:   •  beclomethasone (QVAR REDIHALER) 40 MCG/ACT inhaler, Inhale 2 puffs daily Rinse mouth after use., Disp: 10.6 g, Rfl: 3  •  fluticasone (FLONASE) 50 mcg/act nasal spray, 2 sprays into each nostril, Disp: , Rfl:   •  guaifenesin-codeine (GUAIFENESIN AC) 100-10 MG/5ML liquid, 1-2 tsp every 4 hours as needed for cough, Disp:  240 mL, Rfl: 0    Current Allergies     Allergies as of 10/15/2024 - Reviewed 10/15/2024   Allergen Reaction Noted   • Latex Anaphylaxis 11/09/2015   • Ace inhibitors Other (See Comments)    • Atenolol Other (See Comments) 04/23/2018   • Beta adrenergic blockers Other (See Comments) 11/09/2015   • Lisinopril  04/23/2018   • Prednisolone Other (See Comments)    • Sulfa antibiotics Other (See Comments) 05/28/2015   • Tobramycin  04/23/2018   • Trimethoprim  04/23/2018   • Bacitracin Rash 11/09/2015   • Nifedipine Palpitations 05/28/2015            The following portions of the patient's history were reviewed and updated as appropriate: allergies, current medications, past family history, past medical history, past social history, past surgical history and problem list.     Past Medical History:   Diagnosis Date   • Acquired hypothyroidism 01/07/2010   • Anxiety state 07/29/2010   • Chronic combined systolic and diastolic congestive heart failure (HCC) 08/12/2024   • Chronic low back pain 06/03/2015   • Eczema 03/11/2014   • Hearing loss 01/14/2014   • Hyperlipidemia 06/03/2015   • Hypertension    • Iron deficiency anemia 04/06/2010   • Mild persistent asthma without complication 04/06/2010   • Non morbid obesity due to excess calories 09/06/2017   • Osteoarthritis 11/09/2015   • Osteoporosis    • Rectocele     grade 2   • Skin cancer     treating with dermatologist   • Type 2 diabetes mellitus without complication, without long-term current use of insulin ([facility]) 12/02/2018   • Vitamin D deficiency 07/17/2013       Past Surgical History:   Procedure Laterality Date   • APPENDECTOMY     • CATARACT EXTRACTION Bilateral     and lens implants   • CHOLECYSTECTOMY     • HYSTERECTOMY      age 38   • KNEE SURGERY Bilateral     Revision of left knee 2006   • LUMBAR DISC SURGERY  1999     Dr. Harrison   • MYRINGOTOMY W/ TUBES Right 02/08/2021    tube insertion by Dr. Bahena   • TONSILLECTOMY     • TOTAL ABDOMINAL HYSTERECTOMY W/  "BILATERAL SALPINGOOPHORECTOMY      age 38   • TUBAL LIGATION         Family History   Problem Relation Age of Onset   • Stroke Mother    • Heart disease Father    • No Known Problems Sister    • No Known Problems Daughter    • No Known Problems Maternal Grandmother    • No Known Problems Maternal Grandfather    • No Known Problems Paternal Grandmother    • No Known Problems Paternal Grandfather    • No Known Problems Maternal Aunt    • No Known Problems Maternal Aunt    • No Known Problems Paternal Aunt    • No Known Problems Paternal Aunt    • Breast cancer Neg Hx          Medications have been verified.        Objective   /88 (BP Location: Right arm, Patient Position: Sitting, Cuff Size: Standard)   Pulse 81   Temp 97.8 °F (36.6 °C)   Resp 18   Ht 5' 2\" (1.575 m)   Wt 70.4 kg (155 lb 3.3 oz)   SpO2 96%   BMI 28.39 kg/m²   No LMP recorded. Patient has had a hysterectomy.       Physical Exam     Physical Exam  Vitals and nursing note reviewed.   Constitutional:       Appearance: Normal appearance.   HENT:      Head: Normocephalic and atraumatic.   Pulmonary:      Effort: Pulmonary effort is normal.   Skin:     General: Skin is warm and dry.      Capillary Refill: Capillary refill takes less than 2 seconds.      Findings: Lesion present.          Neurological:      General: No focal deficit present.      Mental Status: She is alert and oriented to person, place, and time. Mental status is at baseline.      Sensory: No sensory deficit.      Motor: No weakness.   Psychiatric:         Mood and Affect: Mood normal.         Behavior: Behavior normal.         Thought Content: Thought content normal.                 "

## 2024-11-22 ENCOUNTER — OFFICE VISIT (OUTPATIENT)
Age: 84
End: 2024-11-22
Payer: COMMERCIAL

## 2024-11-22 VITALS
RESPIRATION RATE: 36 BRPM | BODY MASS INDEX: 29.44 KG/M2 | DIASTOLIC BLOOD PRESSURE: 80 MMHG | HEART RATE: 81 BPM | HEIGHT: 62 IN | WEIGHT: 160 LBS | TEMPERATURE: 97.1 F | SYSTOLIC BLOOD PRESSURE: 142 MMHG | OXYGEN SATURATION: 96 %

## 2024-11-22 DIAGNOSIS — R68.89 FLU-LIKE SYMPTOMS: Primary | ICD-10-CM

## 2024-11-22 LAB
SARS-COV-2 AG UPPER RESP QL IA: NEGATIVE
VALID CONTROL: NORMAL

## 2024-11-22 PROCEDURE — 87811 SARS-COV-2 COVID19 W/OPTIC: CPT | Performed by: EMERGENCY MEDICINE

## 2024-11-22 PROCEDURE — 99213 OFFICE O/P EST LOW 20 MIN: CPT | Performed by: EMERGENCY MEDICINE

## 2024-11-22 RX ORDER — ONDANSETRON 4 MG/1
4 TABLET, FILM COATED ORAL EVERY 8 HOURS PRN
Qty: 12 TABLET | Refills: 0 | Status: SHIPPED | OUTPATIENT
Start: 2024-11-22

## 2024-11-22 RX ORDER — FLUOCINOLONE ACETONIDE 0.11 MG/ML
OIL AURICULAR (OTIC)
COMMUNITY
Start: 2024-09-05

## 2024-11-22 NOTE — PROGRESS NOTES
[facility]        NAME: Mechelle Maldonado is a 84 y.o. female  : 1940    MRN: [id number]  DATE: 2024  TIME: 11:23 AM    Assessment and Plan   Flu-like symptoms [R68.89]  1. Flu-like symptoms  Poct Covid 19 Rapid Antigen Test    ondansetron (ZOFRAN) 4 mg tablet            Patient Instructions     Patient Instructions   You have been diagnosed with a Flu like illness and your symptoms should resolve over the next 7 to 10 days with the treatments recommended today.  If they do not, it is possible that you have developed a bacterial infection and you should return. If you were to take an antibiotic while you are still in the viral stage, you will not get better any faster, but could kill off many of the good germs in your body as well as make the germs in you resistant to the antibiotic.    Take an expectorant - guaifenesin should be the only ingredient - during the day, and the cough suppressant (ex. Robitussin DM or Tessalon) if needed at night only.   Take Zinc 25 mg every 12 hours for the next week (the dose is important so do not just take a multivitamin with zinc or an over-the-counter cold med with zinc such as Airborne or Zicam, as that will not give you the sufficient dose).    You should also take vitamin D3 2000 i.u.s per day for the next 1 week, and vitamin-C 500 mg twice daily (and again dosages are important, do not think you are getting enough vitamin C just by drinking extra orange juice).  May take Flonase as discussed.  You may also take a decongestant like Sudafed, unless you have hypertension or cardiac disease.  You may take Imodium for diarrhea according to package instructions.     If you are diabetic you should adhere strictly to your diabetic diet and monitor blood sugar closely while on prednisone and you should discontinue the prednisone if blood sugar becomes significantly elevated.  Avoid nonsteroidal anti-inflammatories like Advil or Aleve while on prednisone.      Although bothersome, mucous is not necessarily a bad thing. Production of mucous is the body's way of trying to capture and flush irritants from mucosal surfaces. Yellow or green mucous does not necessarily mean you have a bacterial infection. Mucous will become more discolored over time, especially first thing in the morning, as your body's immune system floods the mucosal surfaces with white bloods cells to try and help fight infection. This white blood cell debride can also cause mucous to be discolored. Again, using nasal saline spray frequently may help soothe and keep mucous flowing out versus getting dried, thickened and / or stuck leading to more sinus pain and pressure.      If you have a cough, please realize that a cough is not necessarily a bad thing. It is a part of your body's protection mechanism to help keep your airways clear. Phlegm may be more discolored in the morning. Please note that discolored phlegm does not necessarily mean a bacterial infection.        Follow up with PCP in 3-5 days.  Proceed to ER if symptoms worsen.    AMBULATORY CARE:   Flu-like illness is an infection caused by a virus. The flu is easily spread when an infected person coughs, sneezes, or has close contact with others. You may be able to spread the flu to others for 1 week or longer after signs or symptoms appear.   Common signs and symptoms include the following:   Fever and chills    Headaches, body aches, and muscle or joint pain    Cough, runny nose, and sore throat    Loss of appetite, nausea, vomiting, or diarrhea    Tiredness    Trouble breathing  Call 911 for any of the following:   You have trouble breathing, and your lips look purple or blue.    You have a seizure.  Seek care immediately if:   You are dizzy, or you are urinating less or not at all.     You have a headache with a stiff neck, and you feel tired or confused.    You have new pain or pressure in your chest.    Your symptoms, such as shortness of  breath, vomiting, or diarrhea, get worse.     Your symptoms, such as fever and coughing, seem to get better, but then get worse.  Contact your healthcare provider if:   You have new muscle pain or weakness.    You have questions or concerns about your condition or care.  Treatment for influenza  may include any of the following:  Acetaminophen decreases pain and fever. It is available without a doctor's order. Ask how much to take and how often to take it. Follow directions. Acetaminophen can cause liver damage if not taken correctly.    NSAIDs  such as ibuprofen, help decrease swelling, pain, and fever. This medicine is available with or without a doctor's order. NSAIDs can cause stomach bleeding or kidney problems in certain people. If you take blood thinner medicine, always ask your healthcare provider if NSAIDs are safe for you. Always read the medicine label and follow directions.    Antivirals  help fight a viral infection.  Manage your symptoms:   Rest  as much as you can to help you recover.    Drink liquids as directed  to help prevent dehydration. Ask how much liquid to drink each day and which liquids are best for you.  Prevent the spread of the flu:   Wash your hands often.  Use soap and water. Wash your hands after you use the bathroom, change a child's diapers, or sneeze. Wash your hands before you prepare or eat food. Use gel hand cleanser when soap and water are not available. Do not touch your eyes, nose, or mouth unless you have washed your hands first.       Cover your mouth when you sneeze or cough.  Cough into a tissue or the bend of your arm.    Clean shared items with a germ-killing .  Clean table surfaces, doorknobs, and light switches. Do not share towels, silverware, and dishes with people who are sick. Wash bed sheets, towels, silverware, and dishes with soap and water.     Wear a mask  over your mouth and nose if you are sick or are near anyone who is sick.     Stay away from  others  if you are sick.     Influenza vaccine  helps prevent influenza (flu). Everyone older than 6 months should get a yearly influenza vaccine. Get the vaccine as soon as it is available, usually in September or October each year.  Follow up with your healthcare provider as directed:  Write down your questions so you remember to ask them during your visits.   © 2017 [employer] Information is for End User's use only and may not be sold, redistributed or otherwise used for commercial purposes. All illustrations and images included in CareNotes® are the copyrighted property of [employer], [employer] or [employer].  The above information is an  only. It is not intended as medical advice for individual conditions or treatments. Talk to your doctor, nurse or pharmacist before following any medical regimen to see if it is safe and effective for you.        Follow up with PCP in 3-5 days.  Proceed to  ER if symptoms worsen.    Chief Complaint     Chief Complaint   Patient presents with    Cold Like Symptoms     Started last night. Cough, sob, nausea, felt tired yesterday. Took OTC cold and head congestion medication through the night.          History of Present Illness       Patient complains of cough, congestion, fatigue, nausea since yesterday.        Review of Systems   Review of Systems   Constitutional:  Positive for fatigue. Negative for appetite change, chills and fever.   HENT:  Positive for congestion, rhinorrhea, sinus pressure and sore throat. Negative for trouble swallowing and voice change.    Respiratory:  Positive for cough. Negative for chest tightness, shortness of breath and wheezing.    Cardiovascular:  Negative for chest pain.   Gastrointestinal:  Positive for nausea. Negative for vomiting.   Musculoskeletal:  Negative for myalgias.         Current Medications       Current Outpatient Medications:     acetaminophen (TYLENOL) 650 mg CR tablet, , Disp: , Rfl:      albuterol (PROVENTIL HFA,VENTOLIN HFA) 90 mcg/act inhaler, Inhale 2 puffs every 4 (four) hours, Disp: 54 g, Rfl: 3    amLODIPine (NORVASC) 10 mg tablet, Take 1 tablet (10 mg total) by mouth daily, Disp: 90 tablet, Rfl: 3    betamethasone, augmented, (DIPROLENE-AF) 0.05 % cream, , Disp: , Rfl:     BLACK COHOSH PO, 1 tab twice daily, Disp: , Rfl:     Calcium Carbonate-Vitamin D (CALCIUM 500/D PO), Take 2 tablets by mouth, Disp: , Rfl:     chlorzoxazone (PARAFON FORTE) 500 mg tablet, Take 1 tablet (500 mg total) by mouth 4 (four) times a day as needed for muscle spasms, Disp: 60 tablet, Rfl: 5    Cholecalciferol 125 MCG (5000 UT) capsule, Take 5,000 Units by mouth daily , Disp: , Rfl:     clobetasol (TEMOVATE) 0.05 % cream, Apply topically Taken as needed, Disp: , Rfl:     clotrimazole-betamethasone (LOTRISONE) 1-0.05 % cream, Apply topically 2 (two) times a day, Disp: 30 g, Rfl: 0    diclofenac (VOLTAREN) 75 mg EC tablet, Take 1 tablet (75 mg total) by mouth 2 (two) times a day, Disp: 60 tablet, Rfl: 5    ferrous sulfate 325 (65 Fe) mg tablet, One tablet Monday Wednesday Friday, Disp: , Rfl:     fluocinolone acetonide (DERMOTIC) 0.01 % otic oil, , Disp: , Rfl:     fluticasone (FLONASE) 50 mcg/act nasal spray, 2 sprays into each nostril, Disp: , Rfl:     guaifenesin-codeine (GUAIFENESIN AC) 100-10 MG/5ML liquid, 1-2 tsp every 4 hours as needed for cough, Disp: 240 mL, Rfl: 0    levothyroxine 88 mcg tablet, Take 1 tablet (88 mcg total) by mouth daily, Disp: 90 tablet, Rfl: 3    metFORMIN (GLUCOPHAGE-XR) 500 mg 24 hr tablet, Take 1 tablet (500 mg total) by mouth daily, Disp: 90 tablet, Rfl: 3    multivitamin (THERAGRAN) TABS, Take 1 tablet by mouth, Disp: , Rfl:     mupirocin (BACTROBAN) 2 % ointment, , Disp: , Rfl:     Omega-3 Fatty Acids (FISH OIL) 1200 MG CAPS, Take by mouth, Disp: , Rfl:     ondansetron (ZOFRAN) 4 mg tablet, Take 1 tablet (4 mg total) by mouth every 8 (eight) hours as needed for nausea or vomiting,  Disp: 12 tablet, Rfl: 0    simvastatin (ZOCOR) 10 mg tablet, Take 1 tablet (10 mg total) by mouth daily, Disp: 90 tablet, Rfl: 3    torsemide (DEMADEX) 10 mg tablet, 1-2 tablets daily as needed for leg swelling, Disp: 180 tablet, Rfl: 3    triamcinolone (KENALOG) 0.1 % ointment, , Disp: , Rfl:     alendronate (Fosamax) 70 mg tablet, Take 1 tablet (70 mg total) by mouth every 7 days (Patient not taking: Reported on 11/22/2024), Disp: 13 tablet, Rfl: 3    beclomethasone (QVAR REDIHALER) 40 MCG/ACT inhaler, Inhale 2 puffs daily Rinse mouth after use., Disp: 10.6 g, Rfl: 3    Current Allergies     Allergies as of 11/22/2024 - Reviewed 11/22/2024   Allergen Reaction Noted    Latex Anaphylaxis 11/09/2015    Ace inhibitors Other (See Comments)     Atenolol Other (See Comments) 04/23/2018    Beta adrenergic blockers Other (See Comments) 11/09/2015    Lisinopril  04/23/2018    Prednisolone Other (See Comments)     Sulfa antibiotics Other (See Comments) 05/28/2015    Tobramycin  04/23/2018    Trimethoprim  04/23/2018    Bacitracin Rash 11/09/2015    Nifedipine Palpitations 05/28/2015            The following portions of the patient's history were reviewed and updated as appropriate: allergies, current medications, past family history, past medical history, past social history, past surgical history and problem list.     Past Medical History:   Diagnosis Date    Acquired hypothyroidism 01/07/2010    Anxiety state 07/29/2010    Chronic combined systolic and diastolic congestive heart failure (HCC) 08/12/2024    Chronic low back pain 06/03/2015    Eczema 03/11/2014    Hearing loss 01/14/2014    Hyperlipidemia 06/03/2015    Hypertension     Iron deficiency anemia 04/06/2010    Mild persistent asthma without complication 04/06/2010    Non morbid obesity due to excess calories 09/06/2017    Osteoarthritis 11/09/2015    Osteoporosis     Rectocele     grade 2    Skin cancer     treating with dermatologist    Type 2 diabetes mellitus  "without complication, without long-term current use of insulin (HCC) 12/02/2018    Vitamin D deficiency 07/17/2013       Past Surgical History:   Procedure Laterality Date    APPENDECTOMY      CATARACT EXTRACTION Bilateral     and lens implants    CHOLECYSTECTOMY      HYSTERECTOMY      age 38    KNEE SURGERY Bilateral     Revision of left knee 2006    LUMBAR DISC SURGERY  1999     Dr. Harrison    MYRINGOTOMY W/ TUBES Right 02/08/2021    tube insertion by Dr. Bahena    TONSILLECTOMY      TOTAL ABDOMINAL HYSTERECTOMY W/ BILATERAL SALPINGOOPHORECTOMY      age 38    TUBAL LIGATION         Family History   Problem Relation Age of Onset    Stroke Mother     Heart disease Father     No Known Problems Sister     No Known Problems Daughter     No Known Problems Maternal Grandmother     No Known Problems Maternal Grandfather     No Known Problems Paternal Grandmother     No Known Problems Paternal Grandfather     No Known Problems Maternal Aunt     No Known Problems Maternal Aunt     No Known Problems Paternal Aunt     No Known Problems Paternal Aunt     Breast cancer Neg Hx          Medications have been verified.        Objective   /80   Pulse 81   Temp (!) 97.1 °F (36.2 °C) (Tympanic)   Resp (!) 36   Ht 5' 2\" (1.575 m)   Wt 72.6 kg (160 lb)   SpO2 96%   BMI 29.26 kg/m²        Physical Exam     Physical Exam  Vitals and nursing note reviewed.   Constitutional:       General: She is not in acute distress.     Appearance: She is well-developed. She is not ill-appearing or toxic-appearing.   HENT:      Head: Normocephalic and atraumatic.      Right Ear: External ear normal.      Left Ear: External ear normal.      Nose: Mucosal edema and congestion present.      Mouth/Throat:      Pharynx: Posterior oropharyngeal erythema present. No oropharyngeal exudate.      Tonsils: No tonsillar abscesses.   Cardiovascular:      Rate and Rhythm: Normal rate and regular rhythm.   Pulmonary:      Effort: Pulmonary effort is " normal. No respiratory distress.      Breath sounds: No wheezing, rhonchi or rales.   Musculoskeletal:      Cervical back: Neck supple.   Skin:     General: Skin is warm and dry.      Coloration: Skin is not pale.      Findings: No rash.   Neurological:      Mental Status: She is alert and oriented to person, place, and time.   Psychiatric:         Mood and Affect: Mood normal.         Behavior: Behavior normal.         Thought Content: Thought content normal.         Judgment: Judgment normal.

## 2024-11-22 NOTE — PATIENT INSTRUCTIONS
You have been diagnosed with a Flu like illness and your symptoms should resolve over the next 7 to 10 days with the treatments recommended today.  If they do not, it is possible that you have developed a bacterial infection and you should return. If you were to take an antibiotic while you are still in the viral stage, you will not get better any faster, but could kill off many of the good germs in your body as well as make the germs in you resistant to the antibiotic.    Take an expectorant - guaifenesin should be the only ingredient - during the day, and the cough suppressant (ex. Robitussin DM or Tessalon) if needed at night only.   Take Zinc 25 mg every 12 hours for the next week (the dose is important so do not just take a multivitamin with zinc or an over-the-counter cold med with zinc such as Airborne or Zicam, as that will not give you the sufficient dose).    You should also take vitamin D3 2000 i.u.s per day for the next 1 week, and vitamin-C 500 mg twice daily (and again dosages are important, do not think you are getting enough vitamin C just by drinking extra orange juice).  May take Flonase as discussed.  You may also take a decongestant like Sudafed, unless you have hypertension or cardiac disease.  You may take Imodium for diarrhea according to package instructions.     If you are diabetic you should adhere strictly to your diabetic diet and monitor blood sugar closely while on prednisone and you should discontinue the prednisone if blood sugar becomes significantly elevated.  Avoid nonsteroidal anti-inflammatories like Advil or Aleve while on prednisone.     Although bothersome, mucous is not necessarily a bad thing. Production of mucous is the body's way of trying to capture and flush irritants from mucosal surfaces. Yellow or green mucous does not necessarily mean you have a bacterial infection. Mucous will become more discolored over time, especially first thing in the morning, as your body's  immune system floods the mucosal surfaces with white bloods cells to try and help fight infection. This white blood cell debride can also cause mucous to be discolored. Again, using nasal saline spray frequently may help soothe and keep mucous flowing out versus getting dried, thickened and / or stuck leading to more sinus pain and pressure.      If you have a cough, please realize that a cough is not necessarily a bad thing. It is a part of your body's protection mechanism to help keep your airways clear. Phlegm may be more discolored in the morning. Please note that discolored phlegm does not necessarily mean a bacterial infection.        Follow up with PCP in 3-5 days.  Proceed to ER if symptoms worsen.    AMBULATORY CARE:   Flu-like illness is an infection caused by a virus. The flu is easily spread when an infected person coughs, sneezes, or has close contact with others. You may be able to spread the flu to others for 1 week or longer after signs or symptoms appear.   Common signs and symptoms include the following:   Fever and chills    Headaches, body aches, and muscle or joint pain    Cough, runny nose, and sore throat    Loss of appetite, nausea, vomiting, or diarrhea    Tiredness    Trouble breathing  Call 911 for any of the following:   You have trouble breathing, and your lips look purple or blue.    You have a seizure.  Seek care immediately if:   You are dizzy, or you are urinating less or not at all.     You have a headache with a stiff neck, and you feel tired or confused.    You have new pain or pressure in your chest.    Your symptoms, such as shortness of breath, vomiting, or diarrhea, get worse.     Your symptoms, such as fever and coughing, seem to get better, but then get worse.  Contact your healthcare provider if:   You have new muscle pain or weakness.    You have questions or concerns about your condition or care.  Treatment for influenza  may include any of the following:  Acetaminophen  decreases pain and fever. It is available without a doctor's order. Ask how much to take and how often to take it. Follow directions. Acetaminophen can cause liver damage if not taken correctly.    NSAIDs  such as ibuprofen, help decrease swelling, pain, and fever. This medicine is available with or without a doctor's order. NSAIDs can cause stomach bleeding or kidney problems in certain people. If you take blood thinner medicine, always ask your healthcare provider if NSAIDs are safe for you. Always read the medicine label and follow directions.    Antivirals  help fight a viral infection.  Manage your symptoms:   Rest  as much as you can to help you recover.    Drink liquids as directed  to help prevent dehydration. Ask how much liquid to drink each day and which liquids are best for you.  Prevent the spread of the flu:   Wash your hands often.  Use soap and water. Wash your hands after you use the bathroom, change a child's diapers, or sneeze. Wash your hands before you prepare or eat food. Use gel hand cleanser when soap and water are not available. Do not touch your eyes, nose, or mouth unless you have washed your hands first.       Cover your mouth when you sneeze or cough.  Cough into a tissue or the bend of your arm.    Clean shared items with a germ-killing .  Clean table surfaces, doorknobs, and light switches. Do not share towels, silverware, and dishes with people who are sick. Wash bed sheets, towels, silverware, and dishes with soap and water.     Wear a mask  over your mouth and nose if you are sick or are near anyone who is sick.     Stay away from others  if you are sick.     Influenza vaccine  helps prevent influenza (flu). Everyone older than 6 months should get a yearly influenza vaccine. Get the vaccine as soon as it is available, usually in September or October each year.  Follow up with your healthcare provider as directed:  Write down your questions so you remember to ask them during  your visits.   © 2017 [employer] Information is for End User's use only and may not be sold, redistributed or otherwise used for commercial purposes. All illustrations and images included in CareNotes® are the copyrighted property of [employer] or [employer].  The above information is an  only. It is not intended as medical advice for individual conditions or treatments. Talk to your doctor, nurse or pharmacist before following any medical regimen to see if it is safe and effective for you.

## 2024-11-27 ENCOUNTER — RA CDI HCC (OUTPATIENT)
Dept: OTHER | Facility: HOSPITAL | Age: 84
End: 2024-11-27

## 2024-12-04 ENCOUNTER — OFFICE VISIT (OUTPATIENT)
Dept: FAMILY MEDICINE CLINIC | Facility: CLINIC | Age: 84
End: 2024-12-04
Payer: COMMERCIAL

## 2024-12-04 VITALS
WEIGHT: 159.4 LBS | HEART RATE: 79 BPM | SYSTOLIC BLOOD PRESSURE: 130 MMHG | BODY MASS INDEX: 29.33 KG/M2 | TEMPERATURE: 97.4 F | OXYGEN SATURATION: 96 % | HEIGHT: 62 IN | DIASTOLIC BLOOD PRESSURE: 68 MMHG

## 2024-12-04 DIAGNOSIS — E78.2 MIXED HYPERLIPIDEMIA: ICD-10-CM

## 2024-12-04 DIAGNOSIS — R32 URINARY INCONTINENCE, UNSPECIFIED TYPE: ICD-10-CM

## 2024-12-04 DIAGNOSIS — Z00.00 MEDICARE ANNUAL WELLNESS VISIT, SUBSEQUENT: Primary | ICD-10-CM

## 2024-12-04 DIAGNOSIS — J45.20 MILD INTERMITTENT ASTHMA WITHOUT COMPLICATION: ICD-10-CM

## 2024-12-04 DIAGNOSIS — E11.9 TYPE 2 DIABETES MELLITUS WITHOUT COMPLICATION, WITHOUT LONG-TERM CURRENT USE OF INSULIN (HCC): ICD-10-CM

## 2024-12-04 DIAGNOSIS — I10 BENIGN ESSENTIAL HYPERTENSION: ICD-10-CM

## 2024-12-04 DIAGNOSIS — E03.9 ACQUIRED HYPOTHYROIDISM: ICD-10-CM

## 2024-12-04 PROCEDURE — G0439 PPPS, SUBSEQ VISIT: HCPCS | Performed by: FAMILY MEDICINE

## 2024-12-04 RX ORDER — PREDNISONE 20 MG/1
20 TABLET ORAL DAILY
Qty: 5 TABLET | Refills: 0 | Status: SHIPPED | OUTPATIENT
Start: 2024-12-04 | End: 2024-12-09

## 2024-12-04 NOTE — ASSESSMENT & PLAN NOTE
Orders:    CBC and differential; Future    Comprehensive metabolic panel; Future    Albumin / creatinine urine ratio; Future    Lipid Panel with Direct LDL reflex; Future

## 2024-12-04 NOTE — ASSESSMENT & PLAN NOTE
Lab Results   Component Value Date    HGBA1C 6 08/12/2024       Orders:    Hemoglobin A1C; Future

## 2024-12-04 NOTE — PROGRESS NOTES
Name: Mechelle Maldonado      : 1940      MRN: [id number]  Encounter Provider: Lisa Newton MD  Encounter Date: 2024   Encounter department: [facility]    Assessment & Plan  Medicare annual wellness visit, subsequent  Preventive health issues were discussed with patient, and age appropriate screening tests were ordered as noted in patient's After Visit Summary. Personalized health advice and appropriate referrals for health education or preventive services given if needed, as noted in patient's After Visit Summary.  Continue all medications unchanged and follow up with specialities as scheduled   Start 5 day course of prednisone for acute asthma exacerbation in setting of recent viral infection  Follow up in 4 months or sooner if needed       Benign essential hypertension    Orders:  •  CBC and differential; Future  •  Comprehensive metabolic panel; Future  •  Albumin / creatinine urine ratio; Future  •  Lipid Panel with Direct LDL reflex; Future    Type 2 diabetes mellitus without complication, without long-term current use of insulin (HCC)    Lab Results   Component Value Date    HGBA1C 6 2024       Orders:  •  Hemoglobin A1C; Future    Acquired hypothyroidism    Orders:  •  TSH, 3rd generation; Future    Mixed hyperlipidemia         Mild intermittent asthma without complication    Orders:  •  predniSONE 20 mg tablet; Take 1 tablet (20 mg total) by mouth daily for 5 days    Urinary incontinence, unspecified type           Depression Screening and Follow-up Plan: Patient was screened for depression during today's encounter. They screened negative with a PHQ-2 score of 0.    Urinary Incontinence Plan of Care: counseling topics discussed: limit alcohol, caffeine, spicy foods, and acidic foods, limiting fluid intake 3-4 hours before bed, improving blood sugar control and limiting fluid intake to 60 oz. per day.       HPI     Mechelle Maldonado is a very pleasant 84 year old female who presents  today for a Medicare Wellness visit. She was sick with a cold last week and has been using her albuterol inhaler frequently. She remains compliant with her daily steroid inhaler. She has been having issues with her ears and has been following with ENT. She does have tympanostomy tubes but has also been having wax buildup. She will see ENT in 2 weeks for a follow up. She has also been following with dermatology and was started on a different cream for her dermatitis however it is too expensive.     Patient Care Team:  Lisa Newton MD as PCP - General (Family Medicine)  Garth Sullivan MD    Review of Systems   Constitutional: Negative.    HENT:  Positive for hearing loss.    Eyes: Negative.    Respiratory:  Positive for chest tightness and wheezing.    Cardiovascular: Negative.    Gastrointestinal: Negative.    Genitourinary: Negative.    Musculoskeletal: Negative.    Skin:  Positive for rash.   Neurological: Negative.    Psychiatric/Behavioral: Negative.       Medical History Reviewed by provider this encounter:       Annual Wellness Visit Questionnaire   Mechelle is here for her Subsequent Wellness visit.     Health Risk Assessment:   Patient rates overall health as good. Patient feels that their physical health rating is slightly worse. Patient is satisfied with their life. Eyesight was rated as same. Hearing was rated as slightly worse. Patient feels that their emotional and mental health rating is same. Patients states they are never, rarely angry. Patient states they are sometimes unusually tired/fatigued. Pain experienced in the last 7 days has been some. Patient's pain rating has been 8/10. Patient states that she has experienced no weight loss or gain in last 6 months.     Depression Screening:   PHQ-2 Score: 0      Fall Risk Screening:   In the past year, patient has experienced: no history of falling in past year      Urinary Incontinence Screening:   Patient has leaked urine accidently in the last six months.      Home Safety:  Patient has trouble with stairs inside or outside of their home. Patient has working smoke alarms and has working carbon monoxide detector. Home safety hazards include: none.     Nutrition:   Current diet is Regular.     Medications:   Patient is currently taking over-the-counter supplements. OTC medications include: see medication list. Patient is able to manage medications.     Activities of Daily Living (ADLs)/Instrumental Activities of Daily Living (IADLs):   Walk and transfer into and out of bed and chair?: Yes  Dress and groom yourself?: Yes    Bathe or shower yourself?: Yes    Feed yourself? Yes  Do your laundry/housekeeping?: Yes  Manage your money, pay your bills and track your expenses?: Yes  Make your own meals?: Yes    Do your own shopping?: Yes    Previous Hospitalizations:   Any hospitalizations or ED visits within the last 12 months?: No      Advance Care Planning:   Living will: Yes    Durable POA for healthcare: Yes    Advanced directive: Yes      PREVENTIVE SCREENINGS      Cardiovascular Screening:    General: Screening Not Indicated and History Lipid Disorder      Diabetes Screening:     General: Screening Not Indicated and History Diabetes      Breast Cancer Screening:     General: Screening Current      Cervical Cancer Screening:    General: Screening Not Indicated      Osteoporosis Screening:    General: Screening Not Indicated and History Osteoporosis      Lung Cancer Screening:     General: Screening Not Indicated    Screening, Brief Intervention, and Referral to Treatment (SBIRT)    Screening  Typical number of drinks in a day: 0  Typical number of drinks in a week: 0  Interpretation: Low risk drinking behavior.    Single Item Drug Screening:  How often have you used an illegal drug (including marijuana) or a prescription medication for non-medical reasons in the past year? never    Single Item Drug Screen Score: 0  Interpretation: Negative screen for possible drug use  "disorder    Social Drivers of Health     Financial Resource Strain: Low Risk  (7/31/2023)    Overall Financial Resource Strain (CARDIA)    • Difficulty of Paying Living Expenses: Not hard at all   Food Insecurity: No Food Insecurity (12/4/2024)    Hunger Vital Sign    • Worried About Running Out of Food in the Last Year: Never true    • Ran Out of Food in the Last Year: Never true   Transportation Needs: No Transportation Needs (12/4/2024)    PRAPARE - Transportation    • Lack of Transportation (Medical): No    • Lack of Transportation (Non-Medical): No   Housing Stability: Low Risk  (12/4/2024)    Housing Stability Vital Sign    • Unable to Pay for Housing in the Last Year: No    • Number of Times Moved in the Last Year: 0    • Homeless in the Last Year: No   Utilities: Not At Risk (12/4/2024)    [facility] Utilities    • Threatened with loss of utilities: No     No results found.    Objective   /68 (BP Location: Left arm, Patient Position: Sitting, Cuff Size: Standard)   Pulse 79   Temp (!) 97.4 °F (36.3 °C) (Temporal)   Ht 5' 2\" (1.575 m)   Wt 72.3 kg (159 lb 6.4 oz)   SpO2 96%   BMI 29.15 kg/m²     Physical Exam  Constitutional:       General: She is not in acute distress.     Appearance: She is not ill-appearing.   HENT:      Head: Normocephalic and atraumatic.   Eyes:      General:         Right eye: No discharge.         Left eye: No discharge.      Extraocular Movements: Extraocular movements intact.   Cardiovascular:      Rate and Rhythm: Normal rate.      Pulses: no weak pulses.           Dorsalis pedis pulses are 2+ on the right side and 2+ on the left side.        Posterior tibial pulses are 2+ on the right side and 2+ on the left side.   Pulmonary:      Effort: Pulmonary effort is normal.      Breath sounds: Wheezing present.   Abdominal:      General: Bowel sounds are normal. There is no distension.      Palpations: Abdomen is soft.      Tenderness: There is no abdominal tenderness. "   Musculoskeletal:      Right lower leg: No edema.      Left lower leg: No edema.   Feet:      Right foot:      Skin integrity: Callus and dry skin present. No ulcer, skin breakdown, erythema or warmth.      Left foot:      Skin integrity: Callus and dry skin present. No ulcer, skin breakdown, erythema or warmth.   Neurological:      General: No focal deficit present.      Mental Status: She is alert.   Psychiatric:         Mood and Affect: Mood normal.         Behavior: Behavior normal.   Patient's shoes and socks removed.    Right Foot/Ankle   Right Foot Inspection  Skin Exam: skin normal, skin intact, dry skin, callus and callus. No warmth, no erythema, no maceration, no abnormal color, no pre-ulcer and no ulcer.     Toe Exam: ROM and strength within normal limits.     Sensory   Proprioception: intact  Monofilament testing: intact    Vascular  The right DP pulse is 2+. The right PT pulse is 2+.     Left Foot/Ankle  Left Foot Inspection  Skin Exam: skin normal, skin intact, dry skin and callus. No warmth, no erythema, no maceration, normal color, no pre-ulcer and no ulcer.     Toe Exam: ROM and strength within normal limits.     Sensory   Proprioception: intact  Monofilament testing: intact    Vascular  The left DP pulse is 2+. The left PT pulse is 2+.     Assign Risk Category  No deformity present  No loss of protective sensation  No weak pulses  Risk: 0

## 2024-12-04 NOTE — PATIENT INSTRUCTIONS
Medicare Preventive Visit Patient Instructions  Thank you for completing your Welcome to Medicare Visit or Medicare Annual Wellness Visit today. Your next wellness visit will be due in one year (12/5/2025).  The screening/preventive services that you may require over the next 5-10 years are detailed below. Some tests may not apply to you based off risk factors and/or age. Screening tests ordered at today's visit but not completed yet may show as past due. Also, please note that scanned in results may not display below.  Preventive Screenings:  Service Recommendations Previous Testing/Comments   Colorectal Cancer Screening  * Colonoscopy    * Fecal Occult Blood Test (FOBT)/Fecal Immunochemical Test (FIT)  * Fecal DNA/Cologuard Test  * Flexible Sigmoidoscopy Age: 45-75 years old   Colonoscopy: every 10 years (may be performed more frequently if at higher risk)  OR  FOBT/FIT: every 1 year  OR  Cologuard: every 3 years  OR  Sigmoidoscopy: every 5 years  Screening may be recommended earlier than age 45 if at higher risk for colorectal cancer. Also, an individualized decision between you and your healthcare provider will decide whether screening between the ages of 76-85 would be appropriate. Colonoscopy: Not on file  FOBT/FIT: Not on file  Cologuard: Not on file  Sigmoidoscopy: Not on file          Breast Cancer Screening Age: 40+ years old  Frequency: every 1-2 years  Not required if history of left and right mastectomy Mammogram: 06/24/2024        Cervical Cancer Screening Between the ages of 21-29, pap smear recommended once every 3 years.   Between the ages of 30-65, can perform pap smear with HPV co-testing every 5 years.   Recommendations may differ for women with a history of total hysterectomy, cervical cancer, or abnormal pap smears in past. Pap Smear: 09/08/2020        Hepatitis C Screening Once for adults born between 1945 and 1965  More frequently in patients at high risk for Hepatitis C Hep C Antibody: Not  on file        Diabetes Screening 1-2 times per year if you're at risk for diabetes or have pre-diabetes Fasting glucose: 115 mg/dL (10/15/2024)  A1C: 6 (8/12/2024)      Cholesterol Screening Once every 5 years if you don't have a lipid disorder. May order more often based on risk factors. Lipid panel: 10/15/2024          Other Preventive Screenings Covered by Medicare:  Abdominal Aortic Aneurysm (AAA) Screening: covered once if your at risk. You're considered to be at risk if you have a family history of AAA.  Lung Cancer Screening: covers low dose CT scan once per year if you meet all of the following conditions: (1) Age 55-77; (2) No signs or symptoms of lung cancer; (3) Current smoker or have quit smoking within the last 15 years; (4) You have a tobacco smoking history of at least 20 pack years (packs per day multiplied by number of years you smoked); (5) You get a written order from a healthcare provider.  Glaucoma Screening: covered annually if you're considered high risk: (1) You have diabetes OR (2) Family history of glaucoma OR (3)  aged 50 and older OR (4)  American aged 65 and older  Osteoporosis Screening: covered every 2 years if you meet one of the following conditions: (1) You're estrogen deficient and at risk for osteoporosis based off medical history and other findings; (2) Have a vertebral abnormality; (3) On glucocorticoid therapy for more than 3 months; (4) Have primary hyperparathyroidism; (5) On osteoporosis medications and need to assess response to drug therapy.   Last bone density test (DXA Scan): 10/20/2020.  HIV Screening: covered annually if you're between the age of 15-65. Also covered annually if you are younger than 15 and older than 65 with risk factors for HIV infection. For pregnant patients, it is covered up to 3 times per pregnancy.    Immunizations:  Immunization Recommendations   Influenza Vaccine Annual influenza vaccination during flu season is  recommended for all persons aged >= 6 months who do not have contraindications   Pneumococcal Vaccine   * Pneumococcal conjugate vaccine = PCV13 (Prevnar 13), PCV15 (Vaxneuvance), PCV20 (Prevnar 20)  * Pneumococcal polysaccharide vaccine = PPSV23 (Pneumovax) Adults 19-65 yo with certain risk factors or if 65+ yo  If never received any pneumonia vaccine: recommend Prevnar 20 (PCV20)  Give PCV20 if previously received 1 dose of PCV13 or PPSV23   Hepatitis B Vaccine 3 dose series if at intermediate or high risk (ex: diabetes, end stage renal disease, liver disease)   Respiratory syncytial virus (RSV) Vaccine - COVERED BY MEDICARE PART D  * RSVPreF3 (Arexvy) CDC recommends that adults 60 years of age and older may receive a single dose of RSV vaccine using shared clinical decision-making (SCDM)   Tetanus (Td) Vaccine - COST NOT COVERED BY MEDICARE PART B Following completion of primary series, a booster dose should be given every 10 years to maintain immunity against tetanus. Td may also be given as tetanus wound prophylaxis.   Tdap Vaccine - COST NOT COVERED BY MEDICARE PART B Recommended at least once for all adults. For pregnant patients, recommended with each pregnancy.   Shingles Vaccine (Shingrix) - COST NOT COVERED BY MEDICARE PART B  2 shot series recommended in those 19 years and older who have or will have weakened immune systems or those 50 years and older     Health Maintenance Due:      Topic Date Due   • Breast Cancer Screening: Mammogram  06/24/2025   • DXA SCAN  10/20/2025     Immunizations Due:      Topic Date Due   • COVID-19 Vaccine (7 - 2024-25 season) 09/01/2024     Advance Directives   What are advance directives?  Advance directives are legal documents that state your wishes and plans for medical care. These plans are made ahead of time in case you lose your ability to make decisions for yourself. Advance directives can apply to any medical decision, such as the treatments you want, and if you  want to donate organs.   What are the types of advance directives?  There are many types of advance directives, and each state has rules about how to use them. You may choose a combination of any of the following:  Living will:  This is a written record of the treatment you want. You can also choose which treatments you do not want, which to limit, and which to stop at a certain time. This includes surgery, medicine, IV fluid, and tube feedings.   Durable power of  for healthcare (DPAHC):  This is a written record that states who you want to make healthcare choices for you when you are unable to make them for yourself. This person, called a proxy, is usually a family member or a friend. You may choose more than 1 proxy.  Do not resuscitate (DNR) order:  A DNR order is used in case your heart stops beating or you stop breathing. It is a request not to have certain forms of treatment, such as CPR. A DNR order may be included in other types of advance directives.  Medical directive:  This covers the care that you want if you are in a coma, near death, or unable to make decisions for yourself. You can list the treatments you want for each condition. Treatment may include pain medicine, surgery, blood transfusions, dialysis, IV or tube feedings, and a ventilator (breathing machine).  Values history:  This document has questions about your views, beliefs, and how you feel and think about life. This information can help others choose the care that you would choose.  Why are advance directives important?  An advance directive helps you control your care. Although spoken wishes may be used, it is better to have your wishes written down. Spoken wishes can be misunderstood, or not followed. Treatments may be given even if you do not want them. An advance directive may make it easier for your family to make difficult choices about your care.   Weight Management   Why it is important to manage your weight:  Being  overweight increases your risk of health conditions such as heart disease, high blood pressure, type 2 diabetes, and certain types of cancer. It can also increase your risk for osteoarthritis, sleep apnea, and other respiratory problems. Aim for a slow, steady weight loss. Even a small amount of weight loss can lower your risk of health problems.  How to lose weight safely:  A safe and healthy way to lose weight is to eat fewer calories and get regular exercise. You can lose up about 1 pound a week by decreasing the number of calories you eat by 500 calories each day.   Healthy meal plan for weight management:  A healthy meal plan includes a variety of foods, contains fewer calories, and helps you stay healthy. A healthy meal plan includes the following:  Eat whole-grain foods more often.  A healthy meal plan should contain fiber. Fiber is the part of grains, fruits, and vegetables that is not broken down by your body. Whole-grain foods are healthy and provide extra fiber in your diet. Some examples of whole-grain foods are whole-wheat breads and pastas, oatmeal, brown rice, and bulgur.  Eat a variety of vegetables every day.  Include dark, leafy greens such as spinach, kale, rae greens, and mustard greens. Eat yellow and orange vegetables such as carrots, sweet potatoes, and winter squash.   Eat a variety of fruits every day.  Choose fresh or canned fruit (canned in its own juice or light syrup) instead of juice. Fruit juice has very little or no fiber.  Eat low-fat dairy foods.  Drink fat-free (skim) milk or 1% milk. Eat fat-free yogurt and low-fat cottage cheese. Try low-fat cheeses such as mozzarella and other reduced-fat cheeses.  Choose meat and other protein foods that are low in fat.  Choose beans or other legumes such as split peas or lentils. Choose fish, skinless poultry (chicken or turkey), or lean cuts of red meat (beef or pork). Before you cook meat or poultry, cut off any visible fat.   Use less  fat and oil.  Try baking foods instead of frying them. Add less fat, such as margarine, sour cream, regular salad dressing and mayonnaise to foods. Eat fewer high-fat foods. Some examples of high-fat foods include french fries, doughnuts, ice cream, and cakes.  Eat fewer sweets.  Limit foods and drinks that are high in sugar. This includes candy, cookies, regular soda, and sweetened drinks.  Exercise:  Exercise at least 30 minutes per day on most days of the week. Some examples of exercise include walking, biking, dancing, and swimming. You can also fit in more physical activity by taking the stairs instead of the elevator or parking farther away from stores. Ask your healthcare provider about the best exercise plan for you.      © Copyright [employer] 2018 Information is for End User's use only and may not be sold, redistributed or otherwise used for commercial purposes. All illustrations and images included in CareNotes® are the copyrighted property of A.D.A.M., Inc. or [employer]

## 2025-01-02 DIAGNOSIS — M54.50 CHRONIC LOW BACK PAIN WITHOUT SCIATICA, UNSPECIFIED BACK PAIN LATERALITY: ICD-10-CM

## 2025-01-02 DIAGNOSIS — G89.29 CHRONIC LOW BACK PAIN WITHOUT SCIATICA, UNSPECIFIED BACK PAIN LATERALITY: ICD-10-CM

## 2025-01-02 RX ORDER — CHLORZOXAZONE 500 MG/1
500 TABLET ORAL 4 TIMES DAILY PRN
Qty: 60 TABLET | Refills: 0 | Status: SHIPPED | OUTPATIENT
Start: 2025-01-02

## 2025-01-02 RX ORDER — DICLOFENAC SODIUM 75 MG/1
75 TABLET, DELAYED RELEASE ORAL 2 TIMES DAILY
Qty: 60 TABLET | Refills: 5 | Status: SHIPPED | OUTPATIENT
Start: 2025-01-02

## 2025-01-02 NOTE — TELEPHONE ENCOUNTER
Medication: diclofenac     Dose/Frequency: 75 mg     Quantity: 60 tablets/5 refills     Pharmacy: [facility] [address] [place] [address], [place] - [address] [place] [address] [[facility]]     Office:   [x] PCP/Provider -   [] Speciality/Provider -     Does the patient have enough for 3 days?   [] Yes   [x] No - Send as HP to POD      Medication: Chlorzoxazone     Dose/Frequency: 500 mgs    Quantity: 60 tablets/ 5 refills     Pharmacy: [facility] [address] [place] [address], [place] - [address] [place] [address] [[facility]]     Office:   [x] PCP/Provider -   [] Speciality/Provider -     Does the patient have enough for 3 days?   [] Yes   [x] No - Send as HP to POD

## 2025-02-13 DIAGNOSIS — G89.29 CHRONIC LOW BACK PAIN WITHOUT SCIATICA, UNSPECIFIED BACK PAIN LATERALITY: ICD-10-CM

## 2025-02-13 DIAGNOSIS — M54.50 CHRONIC LOW BACK PAIN WITHOUT SCIATICA, UNSPECIFIED BACK PAIN LATERALITY: ICD-10-CM

## 2025-02-13 RX ORDER — CHLORZOXAZONE 500 MG/1
500 TABLET ORAL 4 TIMES DAILY PRN
Qty: 60 TABLET | Refills: 3 | Status: SHIPPED | OUTPATIENT
Start: 2025-02-13

## 2025-02-13 NOTE — TELEPHONE ENCOUNTER
Medication:  chlorzoxazone (PARAFON FORTE) 500 mg tablet    Dose/Frequency: Take 1 tablet (500 mg total) by mouth 4 (four) times a day as needed for muscle spasms     Quantity: 60 tablet     Pharmacy: [id number] - [place] - [address] [facility] [phone]    Office:   [x] PCP/Provider - Lisa Newton MD   [] Speciality/Provider -     Does the patient have enough for 3 days?   [] Yes   [x] No - Send as HP to POD

## 2025-02-16 DIAGNOSIS — J45.20 MILD INTERMITTENT ASTHMA WITHOUT COMPLICATION: ICD-10-CM

## 2025-02-17 RX ORDER — BECLOMETHASONE DIPROPIONATE HFA 40 UG/1
AEROSOL, METERED RESPIRATORY (INHALATION)
Qty: 10.6 G | Refills: 1 | Status: SHIPPED | OUTPATIENT
Start: 2025-02-17

## 2025-03-26 ENCOUNTER — RA CDI HCC (OUTPATIENT)
Dept: OTHER | Facility: HOSPITAL | Age: 85
End: 2025-03-26

## 2025-03-26 NOTE — PROGRESS NOTES
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

## 2025-04-21 ENCOUNTER — APPOINTMENT (OUTPATIENT)
Age: 85
End: 2025-04-21
Attending: FAMILY MEDICINE
Payer: COMMERCIAL

## 2025-04-21 DIAGNOSIS — E03.9 ACQUIRED HYPOTHYROIDISM: ICD-10-CM

## 2025-04-21 DIAGNOSIS — E11.9 TYPE 2 DIABETES MELLITUS WITHOUT COMPLICATION, WITHOUT LONG-TERM CURRENT USE OF INSULIN (HCC): ICD-10-CM

## 2025-04-21 DIAGNOSIS — I10 BENIGN ESSENTIAL HYPERTENSION: ICD-10-CM

## 2025-04-21 LAB
ALBUMIN SERPL BCG-MCNC: 3.8 G/DL (ref 3.5–5)
ALP SERPL-CCNC: 55 U/L (ref 34–104)
ALT SERPL W P-5'-P-CCNC: 14 U/L (ref 7–52)
ANION GAP SERPL CALCULATED.3IONS-SCNC: 9 MMOL/L (ref 4–13)
AST SERPL W P-5'-P-CCNC: 22 U/L (ref 13–39)
BASOPHILS # BLD AUTO: 0.03 THOUSANDS/ÂΜL (ref 0–0.1)
BASOPHILS NFR BLD AUTO: 1 % (ref 0–1)
BILIRUB SERPL-MCNC: 0.44 MG/DL (ref 0.2–1)
BUN SERPL-MCNC: 11 MG/DL (ref 5–25)
CALCIUM SERPL-MCNC: 10.2 MG/DL (ref 8.4–10.2)
CHLORIDE SERPL-SCNC: 102 MMOL/L (ref 96–108)
CO2 SERPL-SCNC: 27 MMOL/L (ref 21–32)
CREAT SERPL-MCNC: 0.62 MG/DL (ref 0.6–1.3)
CREAT UR-MCNC: 109.6 MG/DL
EOSINOPHIL # BLD AUTO: 0.08 THOUSAND/ÂΜL (ref 0–0.61)
EOSINOPHIL NFR BLD AUTO: 2 % (ref 0–6)
ERYTHROCYTE [DISTWIDTH] IN BLOOD BY AUTOMATED COUNT: 13.8 % (ref 11.6–15.1)
EST. AVERAGE GLUCOSE BLD GHB EST-MCNC: 140 MG/DL
GFR SERPL CREATININE-BSD FRML MDRD: 84 ML/MIN/1.73SQ M
GLUCOSE P FAST SERPL-MCNC: 108 MG/DL (ref 65–99)
HBA1C MFR BLD: 6.5 %
HCT VFR BLD AUTO: 37.4 % (ref 34.8–46.1)
HGB BLD-MCNC: 12 G/DL (ref 11.5–15.4)
IMM GRANULOCYTES # BLD AUTO: 0.02 THOUSAND/UL (ref 0–0.2)
IMM GRANULOCYTES NFR BLD AUTO: 0 % (ref 0–2)
LYMPHOCYTES # BLD AUTO: 1.25 THOUSANDS/ÂΜL (ref 0.6–4.47)
LYMPHOCYTES NFR BLD AUTO: 24 % (ref 14–44)
MCH RBC QN AUTO: 30.8 PG (ref 26.8–34.3)
MCHC RBC AUTO-ENTMCNC: 32.1 G/DL (ref 31.4–37.4)
MCV RBC AUTO: 96 FL (ref 82–98)
MICROALBUMIN UR-MCNC: 10.4 MG/L
MICROALBUMIN/CREAT 24H UR: 9 MG/G CREATININE (ref 0–30)
MONOCYTES # BLD AUTO: 0.66 THOUSAND/ÂΜL (ref 0.17–1.22)
MONOCYTES NFR BLD AUTO: 13 % (ref 4–12)
NEUTROPHILS # BLD AUTO: 3.22 THOUSANDS/ÂΜL (ref 1.85–7.62)
NEUTS SEG NFR BLD AUTO: 60 % (ref 43–75)
NRBC BLD AUTO-RTO: 0 /100 WBCS
PLATELET # BLD AUTO: 336 THOUSANDS/UL (ref 149–390)
PMV BLD AUTO: 11 FL (ref 8.9–12.7)
POTASSIUM SERPL-SCNC: 4.5 MMOL/L (ref 3.5–5.3)
PROT SERPL-MCNC: 6.5 G/DL (ref 6.4–8.4)
RBC # BLD AUTO: 3.9 MILLION/UL (ref 3.81–5.12)
SODIUM SERPL-SCNC: 138 MMOL/L (ref 135–147)
TSH SERPL DL<=0.05 MIU/L-ACNC: 1.55 UIU/ML (ref 0.45–4.5)
WBC # BLD AUTO: 5.26 THOUSAND/UL (ref 4.31–10.16)

## 2025-04-21 PROCEDURE — 85025 COMPLETE CBC W/AUTO DIFF WBC: CPT

## 2025-04-21 PROCEDURE — 83036 HEMOGLOBIN GLYCOSYLATED A1C: CPT

## 2025-04-21 PROCEDURE — 80061 LIPID PANEL: CPT

## 2025-04-21 PROCEDURE — 36415 COLL VENOUS BLD VENIPUNCTURE: CPT

## 2025-04-21 PROCEDURE — 82043 UR ALBUMIN QUANTITATIVE: CPT

## 2025-04-21 PROCEDURE — 82570 ASSAY OF URINE CREATININE: CPT

## 2025-04-21 PROCEDURE — 80053 COMPREHEN METABOLIC PANEL: CPT

## 2025-04-21 PROCEDURE — 84443 ASSAY THYROID STIM HORMONE: CPT

## 2025-04-22 LAB
CHOLEST SERPL-MCNC: 157 MG/DL (ref ?–200)
HDLC SERPL-MCNC: 86 MG/DL
LDLC SERPL CALC-MCNC: 55 MG/DL (ref 0–100)
TRIGL SERPL-MCNC: 81 MG/DL (ref ?–150)

## 2025-04-25 ENCOUNTER — OFFICE VISIT (OUTPATIENT)
Dept: FAMILY MEDICINE CLINIC | Facility: CLINIC | Age: 85
End: 2025-04-25
Payer: COMMERCIAL

## 2025-04-25 VITALS
WEIGHT: 157 LBS | TEMPERATURE: 97.5 F | HEART RATE: 80 BPM | BODY MASS INDEX: 28.89 KG/M2 | HEIGHT: 62 IN | SYSTOLIC BLOOD PRESSURE: 118 MMHG | DIASTOLIC BLOOD PRESSURE: 50 MMHG | OXYGEN SATURATION: 96 %

## 2025-04-25 DIAGNOSIS — R05.1 ACUTE COUGH: ICD-10-CM

## 2025-04-25 DIAGNOSIS — E78.2 MIXED HYPERLIPIDEMIA: ICD-10-CM

## 2025-04-25 DIAGNOSIS — I10 BENIGN ESSENTIAL HYPERTENSION: Primary | ICD-10-CM

## 2025-04-25 DIAGNOSIS — E03.9 ACQUIRED HYPOTHYROIDISM: ICD-10-CM

## 2025-04-25 DIAGNOSIS — E11.9 TYPE 2 DIABETES MELLITUS WITHOUT COMPLICATION, WITHOUT LONG-TERM CURRENT USE OF INSULIN (HCC): ICD-10-CM

## 2025-04-25 DIAGNOSIS — I50.42 CHRONIC COMBINED SYSTOLIC (CONGESTIVE) AND DIASTOLIC (CONGESTIVE) HEART FAILURE (HCC): ICD-10-CM

## 2025-04-25 DIAGNOSIS — F41.1 ANXIETY STATE: ICD-10-CM

## 2025-04-25 DIAGNOSIS — E66.3 OVERWEIGHT: ICD-10-CM

## 2025-04-25 PROCEDURE — G2211 COMPLEX E/M VISIT ADD ON: HCPCS | Performed by: FAMILY MEDICINE

## 2025-04-25 PROCEDURE — 99214 OFFICE O/P EST MOD 30 MIN: CPT | Performed by: FAMILY MEDICINE

## 2025-04-25 RX ORDER — PREDNISONE 20 MG/1
20 TABLET ORAL DAILY
Qty: 5 TABLET | Refills: 0 | Status: SHIPPED | OUTPATIENT
Start: 2025-04-25 | End: 2025-04-30

## 2025-04-25 RX ORDER — DUPILUMAB 200 MG/1.14ML
INJECTION, SOLUTION SUBCUTANEOUS EVERY 2 WEEKS
COMMUNITY

## 2025-04-25 RX ORDER — CODEINE PHOSPHATE AND GUAIFENESIN 10; 100 MG/5ML; MG/5ML
5 SOLUTION ORAL EVERY 6 HOURS
Qty: 118 ML | Refills: 0 | Status: SHIPPED | OUTPATIENT
Start: 2025-04-25

## 2025-04-26 PROBLEM — E66.3 OVERWEIGHT: Status: ACTIVE | Noted: 2017-09-06

## 2025-04-26 RX ORDER — HYDROXYZINE HYDROCHLORIDE 10 MG/1
10 TABLET, FILM COATED ORAL EVERY 6 HOURS PRN
Qty: 30 TABLET | Refills: 0 | Status: SHIPPED | OUTPATIENT
Start: 2025-04-26

## 2025-04-26 NOTE — ASSESSMENT & PLAN NOTE
Lab Results   Component Value Date    HGBA1C 6.5 (H) 04/21/2025   Still at goal.  Continue metformin 500 mg daily

## 2025-04-26 NOTE — ASSESSMENT & PLAN NOTE
Start trial of hydroxyzine 10 mg as needed  Orders:    hydrOXYzine HCL (ATARAX) 10 mg tablet; Take 1 tablet (10 mg total) by mouth every 6 (six) hours as needed for anxiety

## 2025-04-26 NOTE — ASSESSMENT & PLAN NOTE
BMI Counseling: Body mass index is 28.72 kg/m². The BMI is above normal. Nutrition recommendations include decreasing overall calorie intake, moderation in carbohydrate intake, and reducing intake of saturated fat and trans fat.           unable to assess due to oral holding of the bolus delayed initiation of the pharyngeal swallow, adequate laryngeal elevation, adequate tongue base retraction, adequate pharyngeal constriction

## 2025-04-28 ENCOUNTER — TELEPHONE (OUTPATIENT)
Age: 85
End: 2025-04-28

## 2025-04-28 NOTE — TELEPHONE ENCOUNTER
PA for hydrOXYzine HCL 10mg tablet APPROVED     Date(s) approved 01/28/2025-04/28/2026    Patient advised by          []MyChart Message  []Phone call   []LMOM  []L/M to call office as no active Communication consent on file  [x]Unable to leave detailed message as VM not approved on Communication consent       Pharmacy advised by    [x]Fax  []Phone call  []Secure Chat     Approval letter scanned into Media No waiting for letter

## 2025-04-28 NOTE — TELEPHONE ENCOUNTER
PA for hydrOXYzine HCL 10mg tablet SUBMITTED to [employer]    via    []CMM-KEY:   [x]Surescripts  []Availity-Auth ID # NDC #   []Faxed to plan   []Other website   []Phone call Case ID #     [x]PA sent as URGENT    All office notes, labs and other pertaining documents and studies sent. Clinical questions answered. Awaiting determination from insurance company.     Turnaround time for your insurance to make a decision on your Prior Authorization can take 7-21 business days.

## 2025-06-18 ENCOUNTER — TELEPHONE (OUTPATIENT)
Age: 85
End: 2025-06-18

## 2025-06-18 NOTE — TELEPHONE ENCOUNTER
Ilda calling from [facility] to set up a Transitional Care Management visit for patient, Mechelle. Mechelle is being discharged today and being placed in assisted living. Unable to warm transfer to office clerical to schedule. Ilda requests a call back when available to schedule appointment for patient, [phone]. Thank you!

## 2025-06-30 ENCOUNTER — TELEPHONE (OUTPATIENT)
Age: 85
End: 2025-06-30

## 2025-06-30 NOTE — TELEPHONE ENCOUNTER
Patient's daughter calling to schedule EMERGENCY DEPARTMENT follow up for patient. Patient is currently residing in assisted living facility. Patient's home number is not a good call back as patient's spouse is also in a transitional period and not able to answer home phone often.    Warm transfer attempted to [facility] to facilitate scheduling was unsuccessful. Please reach out to patient's daughter Amadou to schedule EMERGENCY DEPARTMENT follow up visit.     Amadou states her spouse is an internist hospitalist at [employer] and would like to speak to Primary Care Provider concerning some of the patient's medical diagnoses and how to approach required treatments for several of her conditions. If possible, she is asking for Primary Care Provider to reach out to him as well to discuss treatment plan (Jarvis Griffith [phone]).    Patient does not have communication consent on file as daughter is living out of state. Please advise.

## 2025-06-30 NOTE — TELEPHONE ENCOUNTER
Attempted to call patient to schedule a OVL for a ED follow up. Unable to get through to patient/unable to leave message.

## 2025-06-30 NOTE — TELEPHONE ENCOUNTER
Patricia the health navigator from [facility] called today and said if we can call the patient back to schedule an appointment for a follow up from the EMERGENCY DEPARTMENT because she was there recently and it was significant. She also said that there are some care gaps to follow up on. Patients high blood pressure and kidney evaluation for people with diabetes. If you have any questions you can contact Patricia at [phone] ext [phone] thank you.